# Patient Record
Sex: MALE | Race: WHITE | NOT HISPANIC OR LATINO | Employment: OTHER | URBAN - METROPOLITAN AREA
[De-identification: names, ages, dates, MRNs, and addresses within clinical notes are randomized per-mention and may not be internally consistent; named-entity substitution may affect disease eponyms.]

---

## 2020-12-28 LAB
LEFT EYE DIABETIC RETINOPATHY: POSITIVE
RIGHT EYE DIABETIC RETINOPATHY: NORMAL

## 2021-04-16 ENCOUNTER — TRANSCRIBE ORDERS (OUTPATIENT)
Dept: ADMINISTRATIVE | Facility: HOSPITAL | Age: 81
End: 2021-04-16

## 2021-04-16 DIAGNOSIS — Z01.818 OTHER SPECIFIED PRE-OPERATIVE EXAMINATION: ICD-10-CM

## 2021-04-16 DIAGNOSIS — Z01.818 PRE-OP TESTING: ICD-10-CM

## 2021-04-16 DIAGNOSIS — Z01.818 OTHER SPECIFIED PRE-OPERATIVE EXAMINATION: Primary | ICD-10-CM

## 2021-04-16 PROCEDURE — U0003 INFECTIOUS AGENT DETECTION BY NUCLEIC ACID (DNA OR RNA); SEVERE ACUTE RESPIRATORY SYNDROME CORONAVIRUS 2 (SARS-COV-2) (CORONAVIRUS DISEASE [COVID-19]), AMPLIFIED PROBE TECHNIQUE, MAKING USE OF HIGH THROUGHPUT TECHNOLOGIES AS DESCRIBED BY CMS-2020-01-R: HCPCS | Performed by: UROLOGY

## 2021-04-16 PROCEDURE — U0005 INFEC AGEN DETEC AMPLI PROBE: HCPCS | Performed by: UROLOGY

## 2021-04-16 NOTE — PRE-PROCEDURE INSTRUCTIONS
My Surgical Experience    The following information was developed to assist you to prepare for your operation  What do I need to do before coming to the hospital?   Arrange for a responsible person to drive you to and from the hospital    Arrange care for your children at home  Children are not allowed in the recovery areas of the hospital   Plan to wear clothing that is easy to put on and take off  If you are having shoulder surgery, wear a shirt that buttons or zippers in the front  Bathing  o Shower the evening before and the morning of your surgery with an antibacterial soap  Please refer to the Pre Op Showering Instructions for Surgery Patients Sheet   o Remove nail polish and all body piercing jewelry  o Do not shave any body part for at least 24 hours before surgery-this includes face, arms, legs and upper body  Food  o Nothing to eat or drink after midnight the night before your surgery  This includes candy and chewing gum  o Exception: If your surgery is after 12:00pm (noon), you may have clear liquids such as 7-Up®, ginger ale, apple or cranberry juice, Jell-O®, water, or clear broth until 8:00 am  o Do not drink milk or juice with pulp on the morning before surgery  o Do not drink alcohol 24 hours before surgery  Medicine  o Follow instructions you received from your surgeon about which medicines you may take on the day of surgery  o If instructed to take medicine on the morning of surgery, take pills with just a small sip of water  Call your prescribing doctor for specific infroamtion on what to do if you take insulin    What should I bring to the hospital?    Bring:  Edita Quintana or a walker, if you have them, for foot or knee surgery   A list of the daily medicines, vitamins, minerals, herbals and nutritional supplements you take   Include the dosages of medicines and the time you take them each day   Glasses, dentures or hearing aids   Minimal clothing; you will be wearing hospital sleepwear   Photo ID; required to verify your identity   If you have a Living Will or Power of , bring a copy of the documents   If you have an ostomy, bring an extra pouch and any supplies you use    Do not bring   Medicines or inhalers   Money, valuables or jewelry    What other information should I know about the day of surgery?  Notify your surgeons if you develop a cold, sore throat, cough, fever, rash or any other illness   Report to the Ambulatory Surgical/Same Day Surgery Unit   You will be instructed to stop at Registration only if you have not been pre-registered   Inform your  fi they do not stay that they will be asked by the staff to leave a phone number where they can be reached   Be available to be reached before surgery  In the event the operating room schedule changes, you may be asked to come in earlier or later than expected    *It is important to tell your doctor and others involved in your health care if you are taking or have been taking any non-prescription drugs, vitamins, minerals, herbals or other nutritional supplements  Any of these may interact with some food or medicines and cause a reaction      Pre-Surgery Instructions:   Medication Instructions    atorvastatin (LIPITOR) 20 mg tablet Instructed patient per Anesthesia Guidelines   clopidogrel (PLAVIX) 75 mg tablet Instructed patient per Anesthesia Guidelines   Dulaglutide 1 5 MG/0 5ML SOPN Instructed patient per Anesthesia Guidelines   finasteride (PROSCAR) 5 mg tablet Instructed patient per Anesthesia Guidelines   furosemide (LASIX) 20 mg tablet Instructed patient per Anesthesia Guidelines   glipiZIDE (GLUCOTROL XL) 5 mg 24 hr tablet Instructed patient per Anesthesia Guidelines   ketorolac (ACULAR) 0 5 % ophthalmic solution Instructed patient per Anesthesia Guidelines   losartan (COZAAR) 50 mg tablet Instructed patient per Anesthesia Guidelines      metoprolol succinate (TOPROL-XL) 50 mg 24 hr tablet Instructed patient per Anesthesia Guidelines   nitroglycerin (Nitrostat) 0 4 mg SL tablet Instructed patient per Anesthesia Guidelines   nystatin-triamcinolone (MYCOLOG-II) cream Instructed patient per Anesthesia Guidelines   potassium chloride (K-DUR,KLOR-CON) 20 mEq tablet Instructed patient per Anesthesia Guidelines   prednisoLONE acetate (PRED FORTE) 1 % ophthalmic suspension Instructed patient per Anesthesia Guidelines   tamsulosin (FLOMAX) 0 4 mg Instructed patient per Anesthesia Guidelines   [DISCONTINUED] ofloxacin (OCUFLOX) 0 3 % ophthalmic solution Instructed patient per Anesthesia Guidelines      To take proscar, metoprolol and eye drops a m  of surgery

## 2021-04-17 ENCOUNTER — OFFICE VISIT (OUTPATIENT)
Dept: LAB | Facility: HOSPITAL | Age: 81
End: 2021-04-17
Attending: UROLOGY
Payer: MEDICARE

## 2021-04-17 ENCOUNTER — APPOINTMENT (OUTPATIENT)
Dept: LAB | Facility: HOSPITAL | Age: 81
End: 2021-04-17
Attending: UROLOGY
Payer: MEDICARE

## 2021-04-17 DIAGNOSIS — Z01.818 PRE-OP TESTING: ICD-10-CM

## 2021-04-17 LAB
ANION GAP SERPL CALCULATED.3IONS-SCNC: 6 MMOL/L (ref 4–13)
BACTERIA UR QL AUTO: ABNORMAL /HPF
BASOPHILS # BLD AUTO: 0.05 THOUSANDS/ΜL (ref 0–0.1)
BASOPHILS NFR BLD AUTO: 1 % (ref 0–1)
BILIRUB UR QL STRIP: NEGATIVE
BUN SERPL-MCNC: 22 MG/DL (ref 5–25)
CALCIUM SERPL-MCNC: 9.2 MG/DL (ref 8.3–10.1)
CHLORIDE SERPL-SCNC: 102 MMOL/L (ref 100–108)
CLARITY UR: ABNORMAL
CO2 SERPL-SCNC: 32 MMOL/L (ref 21–32)
COLOR UR: YELLOW
CREAT SERPL-MCNC: 0.88 MG/DL (ref 0.6–1.3)
EOSINOPHIL # BLD AUTO: 0.19 THOUSAND/ΜL (ref 0–0.61)
EOSINOPHIL NFR BLD AUTO: 2 % (ref 0–6)
ERYTHROCYTE [DISTWIDTH] IN BLOOD BY AUTOMATED COUNT: 14.1 % (ref 11.6–15.1)
GFR SERPL CREATININE-BSD FRML MDRD: 82 ML/MIN/1.73SQ M
GLUCOSE SERPL-MCNC: 167 MG/DL (ref 65–140)
GLUCOSE UR STRIP-MCNC: NEGATIVE MG/DL
HCT VFR BLD AUTO: 43.4 % (ref 36.5–49.3)
HGB BLD-MCNC: 14 G/DL (ref 12–17)
HGB UR QL STRIP.AUTO: ABNORMAL
IMM GRANULOCYTES # BLD AUTO: 0.05 THOUSAND/UL (ref 0–0.2)
IMM GRANULOCYTES NFR BLD AUTO: 1 % (ref 0–2)
KETONES UR STRIP-MCNC: NEGATIVE MG/DL
LEUKOCYTE ESTERASE UR QL STRIP: ABNORMAL
LYMPHOCYTES # BLD AUTO: 2.74 THOUSANDS/ΜL (ref 0.6–4.47)
LYMPHOCYTES NFR BLD AUTO: 28 % (ref 14–44)
MCH RBC QN AUTO: 30 PG (ref 26.8–34.3)
MCHC RBC AUTO-ENTMCNC: 32.3 G/DL (ref 31.4–37.4)
MCV RBC AUTO: 93 FL (ref 82–98)
MONOCYTES # BLD AUTO: 0.75 THOUSAND/ΜL (ref 0.17–1.22)
MONOCYTES NFR BLD AUTO: 8 % (ref 4–12)
NEUTROPHILS # BLD AUTO: 5.93 THOUSANDS/ΜL (ref 1.85–7.62)
NEUTS SEG NFR BLD AUTO: 60 % (ref 43–75)
NITRITE UR QL STRIP: NEGATIVE
NON-SQ EPI CELLS URNS QL MICRO: ABNORMAL /HPF
NRBC BLD AUTO-RTO: 0 /100 WBCS
PH UR STRIP.AUTO: 5.5 [PH]
PLATELET # BLD AUTO: 247 THOUSANDS/UL (ref 149–390)
PMV BLD AUTO: 10.3 FL (ref 8.9–12.7)
POTASSIUM SERPL-SCNC: 3.8 MMOL/L (ref 3.5–5.3)
PROT UR STRIP-MCNC: NEGATIVE MG/DL
RBC # BLD AUTO: 4.66 MILLION/UL (ref 3.88–5.62)
RBC #/AREA URNS AUTO: ABNORMAL /HPF
SARS-COV-2 RNA RESP QL NAA+PROBE: NEGATIVE
SODIUM SERPL-SCNC: 140 MMOL/L (ref 136–145)
SP GR UR STRIP.AUTO: 1.02 (ref 1–1.03)
UROBILINOGEN UR QL STRIP.AUTO: 0.2 E.U./DL
WBC # BLD AUTO: 9.71 THOUSAND/UL (ref 4.31–10.16)
WBC #/AREA URNS AUTO: ABNORMAL /HPF

## 2021-04-17 PROCEDURE — 87086 URINE CULTURE/COLONY COUNT: CPT

## 2021-04-17 PROCEDURE — 93005 ELECTROCARDIOGRAM TRACING: CPT

## 2021-04-17 PROCEDURE — 36415 COLL VENOUS BLD VENIPUNCTURE: CPT | Performed by: UROLOGY

## 2021-04-17 PROCEDURE — 81001 URINALYSIS AUTO W/SCOPE: CPT | Performed by: UROLOGY

## 2021-04-17 PROCEDURE — 85025 COMPLETE CBC W/AUTO DIFF WBC: CPT | Performed by: UROLOGY

## 2021-04-17 PROCEDURE — 80048 BASIC METABOLIC PNL TOTAL CA: CPT

## 2021-04-18 LAB — BACTERIA UR CULT: NORMAL

## 2021-04-19 LAB
ATRIAL RATE: 89 BPM
P AXIS: 53 DEGREES
PR INTERVAL: 198 MS
QRS AXIS: 56 DEGREES
QRSD INTERVAL: 88 MS
QT INTERVAL: 354 MS
QTC INTERVAL: 430 MS
T WAVE AXIS: 59 DEGREES
VENTRICULAR RATE: 89 BPM

## 2021-04-19 PROCEDURE — 93010 ELECTROCARDIOGRAM REPORT: CPT | Performed by: INTERNAL MEDICINE

## 2021-04-20 ENCOUNTER — ANESTHESIA EVENT (OUTPATIENT)
Dept: PERIOP | Facility: HOSPITAL | Age: 81
End: 2021-04-20
Payer: MEDICARE

## 2021-04-20 ENCOUNTER — ANESTHESIA (OUTPATIENT)
Dept: PERIOP | Facility: HOSPITAL | Age: 81
End: 2021-04-20
Payer: MEDICARE

## 2021-04-20 ENCOUNTER — APPOINTMENT (OUTPATIENT)
Dept: RADIOLOGY | Facility: HOSPITAL | Age: 81
End: 2021-04-20
Payer: MEDICARE

## 2021-04-20 ENCOUNTER — HOSPITAL ENCOUNTER (OUTPATIENT)
Facility: HOSPITAL | Age: 81
Setting detail: OUTPATIENT SURGERY
Discharge: HOME | End: 2021-04-20
Attending: UROLOGY | Admitting: UROLOGY
Payer: MEDICARE

## 2021-04-20 VITALS
BODY MASS INDEX: 30.21 KG/M2 | WEIGHT: 204 LBS | TEMPERATURE: 97.4 F | HEIGHT: 69 IN | RESPIRATION RATE: 20 BRPM | HEART RATE: 66 BPM | SYSTOLIC BLOOD PRESSURE: 160 MMHG | DIASTOLIC BLOOD PRESSURE: 76 MMHG | OXYGEN SATURATION: 96 %

## 2021-04-20 DIAGNOSIS — N20.0 CALCULUS OF KIDNEY: ICD-10-CM

## 2021-04-20 DIAGNOSIS — R10.9 UNSPECIFIED ABDOMINAL PAIN: ICD-10-CM

## 2021-04-20 PROBLEM — E11.9 DIABETES MELLITUS, TYPE 2 (HCC): Status: ACTIVE | Noted: 2021-04-20

## 2021-04-20 PROBLEM — I21.9 MI (MYOCARDIAL INFARCTION) (HCC): Status: ACTIVE | Noted: 2021-04-20

## 2021-04-20 PROBLEM — I10 HTN (HYPERTENSION): Status: ACTIVE | Noted: 2021-04-20

## 2021-04-20 LAB — GLUCOSE SERPL-MCNC: 112 MG/DL (ref 65–140)

## 2021-04-20 PROCEDURE — C2617 STENT, NON-COR, TEM W/O DEL: HCPCS | Performed by: UROLOGY

## 2021-04-20 PROCEDURE — 82360 CALCULUS ASSAY QUANT: CPT | Performed by: UROLOGY

## 2021-04-20 PROCEDURE — C1894 INTRO/SHEATH, NON-LASER: HCPCS | Performed by: UROLOGY

## 2021-04-20 PROCEDURE — C1769 GUIDE WIRE: HCPCS | Performed by: UROLOGY

## 2021-04-20 PROCEDURE — 88300 SURGICAL PATH GROSS: CPT | Performed by: PATHOLOGY

## 2021-04-20 PROCEDURE — 74420 UROGRAPHY RTRGR +-KUB: CPT

## 2021-04-20 PROCEDURE — 82948 REAGENT STRIP/BLOOD GLUCOSE: CPT

## 2021-04-20 DEVICE — INLAY URETERAL STENT W/O GUIDEWIRE
Type: IMPLANTABLE DEVICE | Site: URETER | Status: FUNCTIONAL
Brand: BARD® INLAY® URETERAL STENT

## 2021-04-20 RX ORDER — MAGNESIUM HYDROXIDE 1200 MG/15ML
LIQUID ORAL AS NEEDED
Status: DISCONTINUED | OUTPATIENT
Start: 2021-04-20 | End: 2021-04-20 | Stop reason: HOSPADM

## 2021-04-20 RX ORDER — PHENAZOPYRIDINE HYDROCHLORIDE 100 MG/1
100 TABLET, FILM COATED ORAL 3 TIMES DAILY PRN
Qty: 20 TABLET | Refills: 0 | Status: SHIPPED | OUTPATIENT
Start: 2021-04-20 | End: 2022-07-21

## 2021-04-20 RX ORDER — CIPROFLOXACIN 2 MG/ML
400 INJECTION, SOLUTION INTRAVENOUS ONCE
Status: DISCONTINUED | OUTPATIENT
Start: 2021-04-20 | End: 2021-04-21 | Stop reason: HOSPADM

## 2021-04-20 RX ORDER — ONDANSETRON 2 MG/ML
INJECTION INTRAMUSCULAR; INTRAVENOUS AS NEEDED
Status: DISCONTINUED | OUTPATIENT
Start: 2021-04-20 | End: 2021-04-20

## 2021-04-20 RX ORDER — SODIUM CHLORIDE, SODIUM LACTATE, POTASSIUM CHLORIDE, CALCIUM CHLORIDE 600; 310; 30; 20 MG/100ML; MG/100ML; MG/100ML; MG/100ML
INJECTION, SOLUTION INTRAVENOUS CONTINUOUS PRN
Status: DISCONTINUED | OUTPATIENT
Start: 2021-04-20 | End: 2021-04-20

## 2021-04-20 RX ORDER — PROPOFOL 10 MG/ML
INJECTION, EMULSION INTRAVENOUS AS NEEDED
Status: DISCONTINUED | OUTPATIENT
Start: 2021-04-20 | End: 2021-04-20

## 2021-04-20 RX ORDER — DEXAMETHASONE SODIUM PHOSPHATE 4 MG/ML
INJECTION, SOLUTION INTRA-ARTICULAR; INTRALESIONAL; INTRAMUSCULAR; INTRAVENOUS; SOFT TISSUE AS NEEDED
Status: DISCONTINUED | OUTPATIENT
Start: 2021-04-20 | End: 2021-04-20

## 2021-04-20 RX ORDER — FENTANYL CITRATE 50 UG/ML
INJECTION, SOLUTION INTRAMUSCULAR; INTRAVENOUS AS NEEDED
Status: DISCONTINUED | OUTPATIENT
Start: 2021-04-20 | End: 2021-04-20

## 2021-04-20 RX ORDER — CIPROFLOXACIN 500 MG/1
500 TABLET, FILM COATED ORAL EVERY 12 HOURS SCHEDULED
Qty: 6 TABLET | Refills: 0 | Status: SHIPPED | OUTPATIENT
Start: 2021-04-20 | End: 2021-04-23

## 2021-04-20 RX ORDER — SODIUM CHLORIDE, SODIUM LACTATE, POTASSIUM CHLORIDE, CALCIUM CHLORIDE 600; 310; 30; 20 MG/100ML; MG/100ML; MG/100ML; MG/100ML
125 INJECTION, SOLUTION INTRAVENOUS CONTINUOUS
Status: DISCONTINUED | OUTPATIENT
Start: 2021-04-20 | End: 2021-04-21 | Stop reason: HOSPADM

## 2021-04-20 RX ORDER — EPHEDRINE SULFATE 50 MG/ML
INJECTION INTRAVENOUS AS NEEDED
Status: DISCONTINUED | OUTPATIENT
Start: 2021-04-20 | End: 2021-04-20

## 2021-04-20 RX ADMIN — PROPOFOL 30 MG: 10 INJECTION, EMULSION INTRAVENOUS at 16:08

## 2021-04-20 RX ADMIN — DEXAMETHASONE SODIUM PHOSPHATE 4 MG: 4 INJECTION, SOLUTION INTRA-ARTICULAR; INTRALESIONAL; INTRAMUSCULAR; INTRAVENOUS; SOFT TISSUE at 16:13

## 2021-04-20 RX ADMIN — SODIUM CHLORIDE, SODIUM LACTATE, POTASSIUM CHLORIDE, AND CALCIUM CHLORIDE: .6; .31; .03; .02 INJECTION, SOLUTION INTRAVENOUS at 16:01

## 2021-04-20 RX ADMIN — ONDANSETRON 4 MG: 2 INJECTION INTRAMUSCULAR; INTRAVENOUS at 16:13

## 2021-04-20 RX ADMIN — LIDOCAINE HYDROCHLORIDE 40 MG: 20 INJECTION, SOLUTION INTRAVENOUS at 16:04

## 2021-04-20 RX ADMIN — FENTANYL CITRATE 25 MCG: 50 INJECTION, SOLUTION INTRAMUSCULAR; INTRAVENOUS at 16:01

## 2021-04-20 RX ADMIN — FENTANYL CITRATE 25 MCG: 50 INJECTION, SOLUTION INTRAMUSCULAR; INTRAVENOUS at 16:46

## 2021-04-20 RX ADMIN — PROPOFOL 150 MG: 10 INJECTION, EMULSION INTRAVENOUS at 16:04

## 2021-04-20 RX ADMIN — FENTANYL CITRATE 50 MCG: 50 INJECTION, SOLUTION INTRAMUSCULAR; INTRAVENOUS at 17:01

## 2021-04-20 RX ADMIN — CIPROFLOXACIN 400 MG: 2 INJECTION INTRAVENOUS at 16:03

## 2021-04-20 RX ADMIN — EPHEDRINE SULFATE 5 MG: 50 INJECTION, SOLUTION INTRAVENOUS at 16:31

## 2021-04-20 RX ADMIN — SODIUM CHLORIDE, SODIUM LACTATE, POTASSIUM CHLORIDE, AND CALCIUM CHLORIDE 125 ML/HR: .6; .31; .03; .02 INJECTION, SOLUTION INTRAVENOUS at 15:18

## 2021-04-20 NOTE — ANESTHESIA PREPROCEDURE EVALUATION
Procedure:  CYSTOSCOPY RIGHT  URETEROSCOPY WITH LITHOTRIPSY HOLMIUM LASER, AGNIESZKA  RETROGRADE PYELOGRAM AND INSERTION  RIGHT STENT URETERAL (Right Bladder)    Relevant Problems   CARDIO   (+) HTN (hypertension)   (+) MI (myocardial infarction) (HCC)      ENDO   (+) Diabetes mellitus, type 2 (HCC)        Physical Exam    Airway    Mallampati score: II  TM Distance: >3 FB  Neck ROM: full     Dental   No notable dental hx     Cardiovascular  Cardiovascular exam normal    Pulmonary  Pulmonary exam normal     Other Findings        Anesthesia Plan  ASA Score- 3     Anesthesia Type- general with ASA Monitors  Additional Monitors:   Airway Plan: LMA  Plan Factors-Exercise tolerance (METS): >4 METS  Chart reviewed  EKG reviewed  Imaging results reviewed  Existing labs reviewed  Patient summary reviewed  Patient is a current smoker  Patient smoked on day of surgery  Induction- intravenous  Postoperative Plan- Plan for postoperative opioid use  Informed Consent- Anesthetic plan and risks discussed with patient  I personally reviewed this patient with the CRNA  Discussed and agreed on the Anesthesia Plan with the CRNA  Naseem Mora

## 2021-04-20 NOTE — INTERVAL H&P NOTE
Note reviewed and pt seen and examined with PA  Agree with assessment and plan  Unable to review images  He was aware but trusted Segundo's assessment

## 2021-04-20 NOTE — DISCHARGE INSTRUCTIONS
Antibiotic Instructions:  Post-op period: Take first dose tonight  Take second and third dose tomorrow  (take one basically every 12 hours for a total of 3 doses)    For day of stent removal:  Restart morning of stent removal and take until finished (every 12 hours)  3 doses

## 2021-04-20 NOTE — OP NOTE
OPERATIVE REPORT- Dr Simeon Conti  PATIENT NAME: Keturah Win    :  1940  MRN: [id number]  Pt Location: [facility] OR ROOM 04    SURGERY DATE: 2021    Surgeon: Alex Cedeno MD    Pre-op Diagnosis:  1  Right renal stones  15mm renal pelvis and right lower pole 3mm    Post-op Diagnosis:  1  Same    Procedure:  1  Cystoscopy  2  Fluoroscopy  3  Right retrograde pyelography  4  Right ureteroscopy  5  Laser lithotripsy  6  Stone basketing  7  Right ureteral stent placement    Specimen(s):  ID Type Source Tests Collected by Time Destination   1 :  Calculus Kidney, Right STONE ANALYSIS, TISSUE EXAM Alex Cedeno MD 2021 [id number]        Estimated Blood Loss:   Minimal    Complications:    None    Drains:   6 X 24 cm right ureteral stent    Anesthesia type:   General    Indications for surgery:  Please see the dictated history and physical     Findings:  Right renal stones  Procedure and Technique:   After obtaining consent and identifying the patient, antibiotics were given as ordered and the patient was brought to the room  All appropriate leads and monitors were placed and the patient was appropriately positioned on the table  Anesthesia was administered and the patient was sterilely prepped and draped  A timeout was performed where the patient name, , procedure, antibiotics, allergies, etc  were discussed  All in the room were satisfied before the start of the operative procedure  What follows are the operative findings and events  Cystoscopy was performed   imaging was obtained  The stones were located in the renal pelvis and lower pole  A retrograde pyelogram was performed  A guidewire was passed up the ureter to the kidney  A second wire was passed without difficulty  The safety wire was secured to the drape and then a 35 cm long ureteral access sheath was gently passed over the second wire using fluoroscopic guidance    The flexible ureteroscope was passed through the sheath up to the stone  The stones were identified and a picture was taken  The stones were broken with a 365 micron laser fiber  Stones of any significant size were extracted with a stone basket  A retrograde was performed  The scope was withdrawn down the ureter evaluating for any trauma  There were no stone fragments or trauma noted  The safety wire was backloaded through the cystoscope and the stent was placed over the wire  The guidewire was removed and a good coil was noted proximally in the kidney and distally in the bladder  The bladder was drained and the patient was awakened and  transferred to the PACU in satisfactory condition  Plan:  Stent removal in one week  SIGNATURE: Kyler Argueta MD  DATE: April 20, 2021  TIME: 5:32 PM    Portions of the record may have been created with voice recognition software   Occasional wrong word or "sound alike" substitutions may have occurred due to the inherent limitations of voice recognition software   Read the chart carefully and recognize, using context, where substitutions have occurred

## 2021-04-20 NOTE — ANESTHESIA POSTPROCEDURE EVALUATION
Post-Op Assessment Note    CV Status:  Stable  Pain Score: 0    Pain management: adequate        Post Op Vitals Reviewed: Yes      Staff: Anesthesiologist         No complications documented      BP      Temp     Pulse     Resp      SpO2

## 2021-04-28 LAB
CALCIUM OXALATE DIHYDRATE MFR STONE IR: 80 %
COLOR STONE: NORMAL
COM MFR STONE: 10 %
COMMENT-STONE3: NORMAL
COMPOSITION: NORMAL
HYDROXYAPATITE 24H ENGDIFF UR: 10 %
LABORATORY COMMENT REPORT: NORMAL
PHOTO: NORMAL
SIZE STONE: NORMAL MM
SPEC SOURCE SUBJ: NORMAL
STONE ANALYSIS-IMP: NORMAL
WT STONE: 126 MG

## 2021-10-01 ENCOUNTER — TELEPHONE (OUTPATIENT)
Dept: GASTROENTEROLOGY | Facility: CLINIC | Age: 81
End: 2021-10-01

## 2022-01-13 RX ORDER — SILDENAFIL 50 MG/1
TABLET, FILM COATED ORAL DAILY
COMMUNITY
End: 2022-07-21

## 2022-01-13 RX ORDER — TESTOSTERONE CYPIONATE 200 MG/ML
VIAL (ML) INTRAMUSCULAR
COMMUNITY

## 2022-01-18 ENCOUNTER — HOSPITAL ENCOUNTER (OUTPATIENT)
Dept: GASTROENTEROLOGY | Facility: AMBULATORY SURGERY CENTER | Age: 82
Discharge: HOME | End: 2022-01-18
Payer: MEDICARE

## 2022-01-18 ENCOUNTER — ANESTHESIA (OUTPATIENT)
Dept: GASTROENTEROLOGY | Facility: AMBULATORY SURGERY CENTER | Age: 82
End: 2022-01-18

## 2022-01-18 ENCOUNTER — ANESTHESIA EVENT (OUTPATIENT)
Dept: GASTROENTEROLOGY | Facility: AMBULATORY SURGERY CENTER | Age: 82
End: 2022-01-18

## 2022-01-18 VITALS
RESPIRATION RATE: 18 BRPM | TEMPERATURE: 96.7 F | OXYGEN SATURATION: 99 % | DIASTOLIC BLOOD PRESSURE: 68 MMHG | SYSTOLIC BLOOD PRESSURE: 130 MMHG | HEIGHT: 69 IN | BODY MASS INDEX: 30.36 KG/M2 | HEART RATE: 69 BPM | WEIGHT: 205 LBS

## 2022-01-18 DIAGNOSIS — Z86.010 HX OF ADENOMATOUS COLONIC POLYPS: ICD-10-CM

## 2022-01-18 PROCEDURE — 45385 COLONOSCOPY W/LESION REMOVAL: CPT | Performed by: INTERNAL MEDICINE

## 2022-01-18 PROCEDURE — 00811 ANES LWR INTST NDSC NOS: CPT | Performed by: NURSE ANESTHETIST, CERTIFIED REGISTERED

## 2022-01-18 PROCEDURE — 88305 TISSUE EXAM BY PATHOLOGIST: CPT | Performed by: PATHOLOGY

## 2022-01-18 PROCEDURE — 99100 ANES PT EXTEME AGE<1 YR&>70: CPT | Performed by: NURSE ANESTHETIST, CERTIFIED REGISTERED

## 2022-01-18 RX ORDER — SODIUM CHLORIDE 9 MG/ML
20 INJECTION, SOLUTION INTRAVENOUS CONTINUOUS
Status: DISCONTINUED | OUTPATIENT
Start: 2022-01-18 | End: 2022-01-22 | Stop reason: HOSPADM

## 2022-01-18 RX ORDER — SODIUM CHLORIDE 9 MG/ML
30 INJECTION, SOLUTION INTRAVENOUS CONTINUOUS
Status: DISCONTINUED | OUTPATIENT
Start: 2022-01-18 | End: 2022-01-22 | Stop reason: HOSPADM

## 2022-01-18 RX ORDER — PROPOFOL 10 MG/ML
INJECTION, EMULSION INTRAVENOUS AS NEEDED
Status: DISCONTINUED | OUTPATIENT
Start: 2022-01-18 | End: 2022-01-18

## 2022-01-18 RX ADMIN — PROPOFOL 50 MG: 10 INJECTION, EMULSION INTRAVENOUS at 08:08

## 2022-01-18 RX ADMIN — PROPOFOL 130 MG: 10 INJECTION, EMULSION INTRAVENOUS at 08:03

## 2022-01-18 RX ADMIN — PROPOFOL 30 MG: 10 INJECTION, EMULSION INTRAVENOUS at 08:27

## 2022-01-18 RX ADMIN — PROPOFOL 20 MG: 10 INJECTION, EMULSION INTRAVENOUS at 08:17

## 2022-01-18 RX ADMIN — PROPOFOL 20 MG: 10 INJECTION, EMULSION INTRAVENOUS at 08:05

## 2022-01-18 RX ADMIN — PROPOFOL 50 MG: 10 INJECTION, EMULSION INTRAVENOUS at 08:11

## 2022-01-18 RX ADMIN — SODIUM CHLORIDE: 9 INJECTION, SOLUTION INTRAVENOUS at 07:59

## 2022-01-18 RX ADMIN — PROPOFOL 50 MG: 10 INJECTION, EMULSION INTRAVENOUS at 08:14

## 2022-01-18 NOTE — ANESTHESIA POSTPROCEDURE EVALUATION
Post-Op Assessment Note    CV Status:  Stable  Pain Score: 0    Pain management: adequate     Mental Status:  Awake   Hydration Status:  Stable   PONV Controlled:  None   Airway Patency:  Patent      Post Op Vitals Reviewed: Yes      Staff: CRNA         No complications documented      /69 (01/18/22 0835)    Temp     Pulse 80 (01/18/22 0835)   Resp 18 (01/18/22 0835)    SpO2 99 % (01/18/22 0835)

## 2022-01-18 NOTE — ANESTHESIA PREPROCEDURE EVALUATION
Procedure:  COLONOSCOPY    Relevant Problems   ANESTHESIA (within normal limits)      CARDIO   (+) Coronary artery disease   (+) HTN (hypertension)   (+) MI (myocardial infarction) (HCC)      ENDO   (+) Diabetes mellitus, type 2 (HCC)      GI/HEPATIC (within normal limits)      /RENAL   (+) Calculus, kidney      GYN (within normal limits)      HEMATOLOGY (within normal limits)      MUSCULOSKELETAL (within normal limits)      NEURO/PSYCH (within normal limits)      PULMONARY (within normal limits)      Other   (+) Bladder polyps   (+) Colon polyp   (+) HL (hearing loss)        Physical Exam    Airway    Mallampati score: II  TM Distance: >3 FB  Neck ROM: full     Dental   lower dentures and upper dentures,     Cardiovascular  Cardiovascular exam normal    Pulmonary  Decreased breath sounds,     Other Findings        Anesthesia Plan  ASA Score- 2     Anesthesia Type- IV sedation with anesthesia with ASA Monitors  Additional Monitors:   Airway Plan:           Plan Factors-Exercise tolerance (METS): >4 METS  Chart reviewed  Patient is a current smoker  Patient instructed to abstain from smoking on day of procedure  Patient smoked on day of surgery  Induction- intravenous  Postoperative Plan-     Informed Consent- Anesthetic plan and risks discussed with patient

## 2022-01-18 NOTE — DISCHARGE INSTRUCTIONS
Colonoscopy   WHAT YOU NEED TO KNOW:   A colonoscopy is a procedure to examine the inside of your colon (intestine) with a scope  Polyps or tissue growths may have been removed during your colonoscopy  It is normal to feel bloated and to have some abdominal discomfort  You should be passing gas  If you have hemorrhoids or you had polyps removed, you may have a small amount of bleeding  DISCHARGE INSTRUCTIONS:   Seek care immediately if:    You have sudden, severe abdominal pain   You have problems swallowing   You have a large amount of black, sticky bowel movements or blood in your bowel movements   You have sudden trouble breathing   You feel weak, lightheaded, or faint or your heart beats faster than normal for you  Contact your healthcare provider if:    You have a fever and chills   You have nausea or are vomiting   Your abdomen is bloated or feels full and hard   You have abdominal pain   You have black, sticky bowel movements or blood in your bowel movements   You have not had a bowel movement for 3 days after your procedure   You have rash or hives   You have questions or concerns about your procedure  Activity:    Do not lift, strain, or run for 24 hours after your procedure   Rest after your procedure  You have been given medicine to relax you  Do not drive or make important decisions until the day after your procedure  Return to your normal activity as directed   Relieve gas and discomfort from bloating by lying on your right side with a heating pad on your abdomen  You may need to take short walks to help the gas move out  Eat small meals until bloating is relieved  Follow up with your healthcare provider as directed: Write down your questions so you remember to ask them during your visits  If you take a blood thinner, please review the specific instructions from your endoscopist about when you should resume it   These can be found in the Recommendation and Your Medication list sections of this After Visit Summary  High Fiber Diet   WHAT YOU NEED TO KNOW:   What is a high-fiber diet? A high-fiber diet includes foods that have a high amount of fiber  Fiber is the part of fruits, vegetables, and grains that is not broken down by your body  Fiber keeps your bowel movements regular  Fiber can also help lower your cholesterol level, control blood sugar in people with diabetes, and relieve constipation  Fiber can also help you control your weight because it helps you feel full faster  Most adults should eat 25 to 35 grams of fiber each day  Talk to your dietitian or healthcare provider about the amount of fiber you need  What foods are good sources of fiber? · Foods with at least 4 grams of fiber per serving:      ? ? to ½ cup of high-fiber cereal (check the nutrition label on the box)    ? ½ cup of blackberries or raspberries    ? 4 dried prunes    ? 1 cooked artichoke    ? ½ cup of cooked legumes, such as lentils, or red, kidney, and borjas beans    · Foods with 1 to 3 grams of fiber per serving:      ? 1 slice of whole-wheat, pumpernickel, or rye bread    ? ½ cup of cooked brown rice    ? 4 whole-wheat crackers    ? 1 cup of oatmeal    ? ½ cup of cereal with 1 to 3 grams of fiber per serving (check the nutrition label on the box)    ? 1 small piece of fruit, such as an apple, banana, pear, kiwi, or orange    ? 3 dates    ? ½ cup of canned apricots, fruit cocktail, peaches, or pears    ? ½ cup of raw or cooked vegetables, such as carrots, cauliflower, cabbage, spinach, squash, or corn    What are some ways that I can increase fiber in my diet? · Choose brown or wild rice instead of white rice  · Use whole wheat flour in recipes instead of white or all-purpose flour  · Add beans and peas to casseroles or soups  · Choose fresh fruit and vegetables with peels or skins on instead of juices      What other guidelines should I follow? · Add fiber to your diet slowly  You may have abdominal discomfort, bloating, and gas if you add fiber to your diet too quickly  · Drink plenty of liquids as you add fiber to your diet  You may have nausea or develop constipation if you do not drink enough water  Ask how much liquid to drink each day and which liquids are best for you  CARE AGREEMENT:   You have the right to help plan your care  Discuss treatment options with your healthcare provider to decide what care you want to receive  You always have the right to refuse treatment  The above information is an  only  It is not intended as medical advice for individual conditions or treatments  Talk to your doctor, nurse or pharmacist before following any medical regimen to see if it is safe and effective for you  © Copyright [employer] 2021 Information is for End User's use only and may not be sold, redistributed or otherwise used for commercial purposes  All illustrations and images included in CareNotes® are the copyrighted property of A D A M , Inc  or [facility] Gricelda Baldwin   Hemorrhoids   WHAT YOU NEED TO KNOW:   What are hemorrhoids? Hemorrhoids are swollen blood vessels inside your rectum (internal hemorrhoids) or on your anus (external hemorrhoids)  Sometimes a hemorrhoid may prolapse  This means it extends out of your anus  What increases my risk for hemorrhoids? · Pregnancy or obesity    · Straining or sitting for a long time during bowel movements    · Liver disease    · Weak muscles around the anus caused by older age, rectal surgery, or anal intercourse    · A lack of physical activity    · Chronic diarrhea or constipation    · A low-fiber diet    What are the signs and symptoms of hemorrhoids?    · Pain or itching around your anus or inside your rectum    · Swelling or bumps around your anus    · Bright red blood in your bowel movement, on the toilet paper, or in the toilet bowl    · Tissue bulging out of your anus (prolapsed hemorrhoids)    · Incontinence (poor control over urine or bowel movements)    How are hemorrhoids diagnosed? Your healthcare provider will ask about your symptoms, the foods you eat, and your bowel movements  He or she will examine your anus for external hemorrhoids  You may need the following:  · A digital rectal exam  is a test to check for hemorrhoids  Your healthcare provider will put a gloved finger inside your anus to feel for the hemorrhoids  · An anoscopy  is a test that uses a scope (small tube with a light and camera on the end) to look at your hemorrhoids  How are hemorrhoids treated? Treatment will depend on your symptoms  You may need any of the following:  · Medicines  can help decrease pain and swelling, and soften your bowel movement  The medicine may be a pill, pad, cream, or ointment  · Procedures  may be used to shrink or remove your hemorrhoid  Examples include rubber-band ligation, sclerotherapy, and photocoagulation  These procedures may be done in your healthcare provider's office  Ask your healthcare provider for more information about these procedures  · Surgery  may be needed to shrink or remove your hemorrhoids  How can I manage my symptoms? · Apply ice on your anus for 15 to 20 minutes every hour or as directed  Use an ice pack, or put crushed ice in a plastic bag  Cover it with a towel before you apply it to your anus  Ice helps prevent tissue damage and decreases swelling and pain  · Take a sitz bath  Fill a bathtub with 4 to 6 inches of warm water  You may also use a sitz bath pan that fits inside a toilet bowl  Sit in the sitz bath for 15 minutes  Do this 3 times a day, and after each bowel movement  The warm water can help decrease pain and swelling  · Keep your anal area clean  Gently wash the area with warm water daily  Soap may irritate the area  After a bowel movement, wipe with moist towelettes or wet toilet paper   Dry toilet paper can irritate the area  How can I help prevent hemorrhoids? · Do not strain to have a bowel movement  Do not sit on the toilet too long  These actions can increase pressure on the tissues in your rectum and anus  · Drink plenty of liquids  Liquids can help prevent constipation  Ask how much liquid to drink each day and which liquids are best for you  · Eat a variety of high-fiber foods  Examples include fruits, vegetables, and whole grains  Ask your healthcare provider how much fiber you need each day  You may need to take a fiber supplement  · Exercise as directed  Exercise, such as walking, may make it easier to have a bowel movement  Ask your healthcare provider to help you create an exercise plan  · Do not have anal sex  Anal sex can weaken the skin around your rectum and anus  · Avoid heavy lifting  This can cause straining and increase your risk for another hemorrhoid  When should I seek immediate care? · You have severe pain in your rectum or around your anus  · You have severe pain in your abdomen and you are vomiting  · You have bleeding from your anus that soaks through your underwear  When should I contact my healthcare provider? · You have frequent and painful bowel movements  · Your hemorrhoid looks or feels more swollen than usual      · You do not have a bowel movement for 2 days or more  · You see or feel tissue coming through your anus  · You have questions or concerns about your condition or care  CARE AGREEMENT:   You have the right to help plan your care  Learn about your health condition and how it may be treated  Discuss treatment options with your healthcare providers to decide what care you want to receive  You always have the right to refuse treatment  The above information is an  only  It is not intended as medical advice for individual conditions or treatments   Talk to your doctor, nurse or pharmacist before following any medical regimen to see if it is safe and effective for you  © Copyright [employer] 2021 Information is for End User's use only and may not be sold, redistributed or otherwise used for commercial purposes  All illustrations and images included in CareNotes® are the copyrighted property of A D A M , Inc  or Maria Del Carmen Pelletier  Diverticulosis   WHAT YOU NEED TO KNOW:   What is diverticulosis? Diverticulosis is a condition that causes small pockets called diverticula to form in your intestine  These pockets make it difficult for bowel movements to pass through your digestive system  What causes diverticulosis? Diverticula form when muscles have to work hard to move bowel movements through the intestine  The force causes bulges to form at weak areas in the intestine  This may happen if you eat foods that are low in fiber  Fiber helps give your bowel movements more bulk so they are larger and easier to move through your colon  The following may increase your risk of diverticulosis:  · A history of constipation    · Age 36 or older    · Obesity    · Lack of exercise    What are the signs and symptoms of diverticulosis? Diverticulosis usually does not cause any signs or symptoms  It may cause any of the following in some people:  · Pain or discomfort in your lower abdomen    · Abdominal bloating    · Constipation or diarrhea    How is diverticulosis diagnosed? Your healthcare provider will examine you and ask about your bowel movements, diet, and symptoms  He or she will also ask about any medical conditions you have or medicines you take  You may need any of the following:  · Blood tests  may be done to check for signs of inflammation  · A barium enema  is an x-ray of your colon that may show diverticula  A tube is put into your anus, and a liquid called barium is put through the tube   Barium is used so that healthcare providers can see your colon more clearly  · Flexible sigmoidoscopy  is a test to look for any changes in your lower intestines and rectum  It may also show the cause of any bleeding or pain  A soft, bendable tube with a light on the end will be put into your anus  It will then be moved forward into your intestine  · A colonoscopy  is used to look at your whole colon  A scope (long bendable tube with a light on the end) is used to take pictures  This test may show diverticula  · A CT scan , or CAT scan, may show diverticula  You may be given contrast liquid before the scan  Tell the healthcare provider if you have ever had an allergic reaction to contrast liquid  How is diverticulosis managed? The goal of treatment is to manage any symptoms you have and prevent other problems such as diverticulitis  Diverticulitis is swelling or infection of the diverticula  Your healthcare provider may recommend any of the following:  · Eat a variety of high-fiber foods  High-fiber foods help you have regular bowel movements  High-fiber foods include cooked beans, fruits, vegetables, and some cereals  Most adults need 25 to 35 grams of fiber each day  Your healthcare provider may recommend that you have more  Ask your healthcare provider how much fiber you need  Increase fiber slowly  You may have abdominal discomfort, bloating, and gas if you add fiber to your diet too quickly  You may need to take a fiber supplement if you are not getting enough fiber from food  · Medicines  to soften your bowel movements may be given  You may also need medicines to treat symptoms such as bloating and pain  · Drink liquids as directed  You may need to drink 2 to 3 liters (8 to 12 cups) of liquids every day  Ask your healthcare provider how much liquid to drink each day and which liquids are best for you  · Apply heat  on your abdomen for 20 to 30 minutes every 2 hours for as many days as directed  Heat helps decrease pain and muscle spasms      How can I help prevent diverticulitis or other symptoms? The following may help decrease your risk for diverticulitis or symptoms, such as bleeding  Talk to your provider about these or other things you can do to prevent problems that may occur with diverticulosis  · Exercise regularly  Ask your healthcare provider about the best exercise plan for you  Exercise can help you have regular bowel movements  Get 30 minutes of exercise on most days of the week  · Maintain a healthy weight  Ask your healthcare provider what a healthy weight is for you  Ask him or her to help you create a weight loss plan if you are overweight  · Do not smoke  Nicotine and other chemicals in cigarettes increase your risk for diverticulitis  Ask your healthcare provider for information if you currently smoke and need help to quit  E-cigarettes or smokeless tobacco still contain nicotine  Talk to your healthcare provider before you use these products  · Ask your healthcare provider if it is safe to take NSAIDs  NSAIDs may increase your risk of diverticulitis  When should I seek immediate care? · You have severe pain on the left side of your lower abdomen  · Your bowel movements are bright or dark red  When should I call my doctor? · You have a fever and chills  · You feel dizzy or lightheaded  · You have nausea, or you are vomiting  · You have a change in your bowel movements  · You have questions or concerns about your condition or care  CARE AGREEMENT:   You have the right to help plan your care  Learn about your health condition and how it may be treated  Discuss treatment options with your healthcare providers to decide what care you want to receive  You always have the right to refuse treatment  The above information is an  only  It is not intended as medical advice for individual conditions or treatments   Talk to your doctor, nurse or pharmacist before following any medical regimen to see if it is safe and effective for you  © Copyright [address] 2021 Information is for End User's use only and may not be sold, redistributed or otherwise used for commercial purposes  All illustrations and images included in CareNotes® are the copyrighted property of A D A M , Inc  or Maria Del Carmen Baldwin   Colorectal Polyps   WHAT YOU NEED TO KNOW:   What are colorectal polyps? Colorectal polyps are small growths of tissue in the lining of the colon and rectum  Most polyps are usually benign (not cancer)  Certain types of polyps, called adenomatous polyps, may turn into cancer  What increases my risk for colorectal polyps? The exact cause of colorectal polyps is unknown  The following may increase your risk:  · Older age    · Foods high in fat and low in fiber    · Family history of polyps    · Intestinal diseases, such as Crohn disease or ulcerative colitis    · Smoking cigarettes or drinking alcohol    · Lack of physical activity, such as exercise    · Obesity    What are the signs and symptoms of colorectal polyps? · Blood in your bowel movement or bleeding from the rectum    · Change in bowel movement habits, such as diarrhea or constipation    · Abdominal pain    What do I need to know about colorectal polyp screening and diagnosis? Screening means you are checked for polyps that may be cancer, even if you do not have signs or symptoms  Screening is recommended starting at age 48 and continuing to age 76 if you are at average risk  Your healthcare provider may suggest screening starting at age 39  Screening may start before you are 45 or continue after you are 75 if your risk is high  Your provider will tell you how often to get screened  Timing depends on the type of screening and if polyps are found  Timing also depends on your age and if you are at increased risk for cancer  Screening may be recommended every 1, 2, 5, or 10 years   Your healthcare provider will need to test polyps to find out if they are cancer  Any of the following may be used to find polyps:  · A digital rectal exam  means your provider will use a finger to check for polyps  · A barium enema  is an x-ray of the colon  A tube is put into your anus, and a liquid called barium is put through the tube  Barium is used so that healthcare providers can see your colon better on the x-ray film  · A virtual colonoscopy  is a CT scan that takes pictures of the inside of your colon and rectum  A small, flexible tube is put into your rectum and air or carbon dioxide (gas) is used to expand your colon  This lets healthcare providers clearly see your colon and any polyps on a monitor  · Colonoscopy or sigmoidoscopy  are procedures to help your provider see the inside of your colon  He or she will use a flexible tube with a small light and camera on the end  During a sigmoidoscopy, your provider will only look at rectum and lower colon  During a colonoscopy, he or she will look at the full length of your colon  A small amount of tissue may be removed from your colon for tests  How are colorectal polyps treated? Small, benign polyps may not need treatment  Your healthcare provider will check the polyp over time to make sure it is not changing  Polyps that are cancer may be removed with one of the following:  · A polypectomy  is a minimally invasive procedure to remove a polyp during a colonoscopy or sigmoidoscopy  Your healthcare provider may need to remove the polyp with a laparoscope  Laparoscopy is done by inserting a small, flexible scope into incisions made on your abdomen  · Surgery  may be needed to remove large or deep polyps that cannot be removed in a polypectomy  Tissues or lymph nodes near a polyp may also be removed  This helps stop cancer from spreading  What can I do to lower my risk for colorectal polyps? · Eat a variety of healthy foods    Healthy foods include fruit, vegetables, whole-grain breads, low-fat dairy products, beans, lean meat, and fish  Ask if you need to be on a special diet  · Maintain a healthy weight  Ask your healthcare provider what a healthy weight is for you  Ask him or her to help with a weight loss plan if you are overweight  · Exercise as directed  Begin to exercise slowly and do more as you get stronger  Talk with your healthcare provider before you start an exercise program          · Limit alcohol  Your risk for polyps increases the more you drink  · Do not smoke  Nicotine and other chemicals in cigarettes and cigars increase your risk for polyps  Ask your healthcare provider for information if you currently smoke and need help to quit  E-cigarettes or smokeless tobacco still contain nicotine  Talk to your healthcare provider before you use these products  Where can I find more information? · [place] ([facility])  [address], [place] [address]  Phone: [phone]  Web Address: Elita Bernheim  [facility] gov    Call your local emergency number (911 in the [address]) if:   · You have sudden shortness of breath  · You have a fast heart rate, fast breathing, or are too dizzy to stand up  When should I seek immediate care? · You have severe abdominal pain  · You see blood in your bowel movement  When should I call my doctor? · You have a fever  · You have chills, a cough, or feel weak and achy  · You have abdominal pain that does not go away or gets worse after you take medicine  · Your abdomen is swollen  · You are losing weight without trying  · You have questions or concerns about your condition or care  CARE AGREEMENT:   You have the right to help plan your care  Learn about your health condition and how it may be treated  Discuss treatment options with your healthcare providers to decide what care you want to receive  You always have the right to refuse treatment   The above information is an  only  It is not intended as medical advice for individual conditions or treatments  Talk to your doctor, nurse or pharmacist before following any medical regimen to see if it is safe and effective for you  © Copyright [facility] 2021 Information is for End User's use only and may not be sold, redistributed or otherwise used for commercial purposes   All illustrations and images included in CareNotes® are the copyrighted property of A [employer] A M , Inc  or [address] [place] [address]

## 2022-01-18 NOTE — H&P
History and Physical - [facility]  Lashell Doan 80 y o  male MRN: [id number]                  HPI: Lashell Doan is a 80y o  year old male who presents for colonoscopy due to history of tubular adenomas last colonoscopy 2016      REVIEW OF SYSTEMS: Per the HPI, and otherwise unremarkable  Historical Information   Past Medical History:   Diagnosis Date    Bladder polyps     history of    Colon polyp     Coronary artery disease     Diabetes mellitus ([address] )     Ear problems     HL (hearing loss)     Hyperlipidemia     Hypertension     Kidney stone     [facility] infarction [facility] [address] [facility]     Tinnitus      Past Surgical History:   Procedure Laterality Date    CATARACT EXTRACTION      CHOLECYSTECTOMY      COLONOSCOPY      CORONARY ANGIOPLASTY WITH STENT PLACEMENT      x3 last one 2019    FL RETROGRADE PYELOGRAM  4/20/2021    MYRINGOTOMY W/ TUBES      CO CYSTO/URETERO W/LITHOTRIPSY &INDWELL STENT INSRT Right 4/20/2021    Procedure: CYSTOSCOPY RIGHT  URETEROSCOPY WITH LITHOTRIPSY HOLMIUM LASER, AGNIESZKA  RETROGRADE PYELOGRAM AND INSERTION  RIGHT STENT URETERAL;  Surgeon: Tj Kumar MD;  Location: [facility];  Service: Urology    TONSILLECTOMY      WISDOM TOOTH EXTRACTION       Social History   Social History     Substance and Sexual Activity   Alcohol Use Not Currently     Social History     Substance and Sexual Activity   Drug Use Never     Social History     Tobacco Use   Smoking Status Current Every Day Smoker    Packs/day: 0 50    Years: 50 00    Pack years: 25 00    Types: Cigarettes   Smokeless Tobacco Never Used     History reviewed  No pertinent family history      Meds/Allergies       Current Outpatient Medications:     ASPIRIN PO    atorvastatin (LIPITOR) 20 mg tablet    clopidogrel (PLAVIX) 75 mg tablet    Dulaglutide 1 5 MG/0 5ML SOPN    finasteride (PROSCAR) 5 mg tablet    furosemide (LASIX) 20 mg tablet    glipiZIDE (GLUCOTROL XL) 5 mg 24 hr tablet   GLUCOSAMINE-CHONDROITIN PO    losartan (COZAAR) 50 mg tablet    metoprolol succinate (TOPROL-XL) 50 mg 24 hr tablet    nystatin-triamcinolone (MYCOLOG-II) cream    potassium chloride (K-DUR,KLOR-CON) 20 mEq tablet    sildenafil (VIAGRA) 50 MG tablet    tamsulosin (FLOMAX) 0 4 mg    Testosterone Cypionate 200 MG/ML SOLN    ketorolac (ACULAR) 0 5 % ophthalmic solution    nitroglycerin (Nitrostat) 0 4 mg SL tablet    phenazopyridine (PYRIDIUM) 100 mg tablet    TRAMADOL HCL PO    Current Facility-Administered Medications:     sodium chloride 0 9 % infusion, 30 mL/hr, Intravenous, Continuous    Allergies   Allergen Reactions    Penicillins Rash     none         Objective     /73   Pulse 85   Temp (!) 96 7 °F (35 9 °C) (Temporal)   Resp 18   Ht 5' 9" (1 753 m)   Wt 93 kg (205 lb)   SpO2 98%   BMI 30 27 kg/m²       PHYSICAL EXAM    Gen: NAD  Head: NCAT  CV: RRR  CHEST: Clear  ABD: soft, NT/ND  EXT: no edema      ASSESSMENT/PLAN:  This is a 80y o  year old male here for colonoscopy, and he is stable and optimized for his procedure

## 2022-07-12 ENCOUNTER — TELEPHONE (OUTPATIENT)
Dept: FAMILY MEDICINE CLINIC | Facility: CLINIC | Age: 82
End: 2022-07-12

## 2022-07-12 NOTE — TELEPHONE ENCOUNTER
Pt called requesting to see the doctor he would not give me any information Henny Thurman then took the call and pt stated he was having chest pain and shortness of breath  He stated that he called Cardiology and they could not see him, he was advised to go to the ER

## 2022-07-15 ENCOUNTER — TELEPHONE (OUTPATIENT)
Dept: FAMILY MEDICINE CLINIC | Facility: CLINIC | Age: 82
End: 2022-07-15

## 2022-07-15 NOTE — TELEPHONE ENCOUNTER
Pt called and was asking if I can resubmitt his patient assistance paperwork to the [facility] program  I down loaded the new forms and they are complete for the Dr to sign   will attach to this call and scan into chart        Nenita Guadarrama

## 2022-07-21 ENCOUNTER — DOCUMENTATION (OUTPATIENT)
Dept: FAMILY MEDICINE CLINIC | Facility: CLINIC | Age: 82
End: 2022-07-21

## 2022-07-21 DIAGNOSIS — E11.51 TYPE 2 DIABETES MELLITUS WITH DIABETIC PERIPHERAL ANGIOPATHY WITHOUT GANGRENE, WITHOUT LONG-TERM CURRENT USE OF INSULIN (HCC): Primary | ICD-10-CM

## 2022-07-21 RX ORDER — ROSUVASTATIN CALCIUM 10 MG/1
TABLET, COATED ORAL DAILY
COMMUNITY
End: 2022-09-06

## 2022-07-21 NOTE — TELEPHONE ENCOUNTER
This is [facility] pharmacy calling about Baby Manner [id number]  He is requesting a refill on his metformin 1000 milligrams and we got a denial saying that you already responded by other means, but we have not received any prescriptions for metformin for Baby Manner [phone]  So could you send it through again or give us a call [phone]? Thank you  Please refill this prescription for Mr Bj Lake for metformin 1000mg  BID to [facility]  Thank you

## 2022-07-26 ENCOUNTER — RA CDI HCC (OUTPATIENT)
Dept: OTHER | Facility: HOSPITAL | Age: 82
End: 2022-07-26

## 2022-07-26 NOTE — PROGRESS NOTES
[id number]  [facility]  coding opportunities          Chart Reviewed number of suggestions sent to Provider: 1     Patients Insurance     Medicare Insurance: [employer]

## 2022-08-03 ENCOUNTER — OFFICE VISIT (OUTPATIENT)
Dept: FAMILY MEDICINE CLINIC | Facility: CLINIC | Age: 82
End: 2022-08-03
Payer: MEDICARE

## 2022-08-03 VITALS
TEMPERATURE: 97 F | OXYGEN SATURATION: 97 % | DIASTOLIC BLOOD PRESSURE: 73 MMHG | RESPIRATION RATE: 18 BRPM | BODY MASS INDEX: 30.98 KG/M2 | WEIGHT: 209.2 LBS | SYSTOLIC BLOOD PRESSURE: 140 MMHG | HEIGHT: 69 IN | HEART RATE: 81 BPM

## 2022-08-03 DIAGNOSIS — E11.9 TYPE 2 DIABETES MELLITUS WITHOUT COMPLICATION, WITHOUT LONG-TERM CURRENT USE OF INSULIN (HCC): Primary | ICD-10-CM

## 2022-08-03 DIAGNOSIS — G47.00 INSOMNIA, UNSPECIFIED TYPE: ICD-10-CM

## 2022-08-03 DIAGNOSIS — I77.9 PERIPHERAL ARTERIAL OCCLUSIVE DISEASE (HCC): ICD-10-CM

## 2022-08-03 DIAGNOSIS — I10 PRIMARY HYPERTENSION: ICD-10-CM

## 2022-08-03 DIAGNOSIS — I25.10 ATHEROSCLEROSIS OF NATIVE CORONARY ARTERY OF NATIVE HEART WITHOUT ANGINA PECTORIS: ICD-10-CM

## 2022-08-03 PROBLEM — M48.061 SPINAL STENOSIS OF LUMBAR REGION: Status: ACTIVE | Noted: 2022-08-03

## 2022-08-03 PROBLEM — F17.200 TOBACCO DEPENDENCE SYNDROME: Status: ACTIVE | Noted: 2022-08-03

## 2022-08-03 PROBLEM — IMO0002 HYPOGONADISM: Status: ACTIVE | Noted: 2022-08-03

## 2022-08-03 PROBLEM — E78.2 MIXED HYPERLIPIDEMIA: Status: ACTIVE | Noted: 2022-08-03

## 2022-08-03 PROCEDURE — 99214 OFFICE O/P EST MOD 30 MIN: CPT

## 2022-08-03 RX ORDER — DIAZEPAM 5 MG/1
5 TABLET ORAL EVERY 6 HOURS PRN
Qty: 30 TABLET | Refills: 0 | Status: SHIPPED | OUTPATIENT
Start: 2022-08-03

## 2022-08-03 NOTE — PROGRESS NOTES
Assessment/Plan:         Problem List Items Addressed This Visit        Endocrine    Diabetes mellitus, type 2 ([facility] [address] ) - Primary       Lab Results   Component Value Date    HGBA1C 8 0 (H) 07/12/2022   Under control  Continue current medication  We will re-evaluate at next office visit  Cardiovascular and Mediastinum    HTN (hypertension)     Under control  Continue current medication  We will re-evaluate at next office visit  Atherosclerosis of coronary artery without angina pectoris     Under control  Continue current medication  We will re-evaluate at next office visit  Peripheral arterial occlusive disease ([facility] )     Under control  Continue current medication  We will re-evaluate at next office visit  Other Visit Diagnoses     BMI 30 0-30 9,adult                Subjective:      Patient ID: Veronika Swan is a [de-identified] y o  male  The patient was delivered hospital overnight for chest pain workup was negative it does happen on July 12th and discharged on July 13  Patient here for review of chronic medical problems and review of the labs and imaging if it is applicable  Currently has no specific complaints other than mentioned in the review of systems  Denies chest pain, SOB, cough, abdominal pain, nausea, vomiting, fever, chills, lightheadedness, dizziness,headache, tingling or numbness  No bowel or bladder problem  The following portions of the patient's history were reviewed and updated as appropriate:   Past Medical History:  He has a past medical history of Bladder polyps, Colon polyp, Coronary artery disease, Diabetes mellitus ([facility] [address] ), DJD (degenerative joint disease), Ear problems, HL (hearing loss), Hyperlipidemia, Hypertension, Kidney stone, Myocardial infarction ([facility] [address] ), Obesity, Osteoporosis, and Tinnitus  ,  _______________________________________________________________________  Medical Problems:  does not have any pertinent problems on file ,  _______________________________________________________________________  Past Surgical History:   has a past surgical history that includes Myringotomy w/ tubes; Tonsillectomy; Cholecystectomy; Cataract extraction (11/15/2020); Colonoscopy; Coronary angioplasty with stent; pr cysto/uretero w/lithotripsy &indwell stent insrt (Right, 04/20/2021); FL retrograde pyelogram (04/20/2021); [place] tooth extraction; Bladder surgery; Cardiac catheterization; and Cataract extraction (12/03/2020)  ,  _______________________________________________________________________  Family History:  family history includes Cancer in his father; Heart disease in his mother ,  _______________________________________________________________________  Social History:   reports that he has been smoking cigarettes  He has a 25 00 pack-year smoking history  He has never used smokeless tobacco  He reports previous alcohol use  He reports that he does not use drugs  ,  _______________________________________________________________________  Allergies:  is allergic to penicillins     _______________________________________________________________________  Current Outpatient Medications   Medication Sig Dispense Refill    ASPIRIN PO Take 81 mg by mouth in the morning      clopidogrel (PLAVIX) 75 mg tablet Daily Last dose 4/15/21      Dulaglutide 1 5 MG/0 5ML SOPN once a week      finasteride (PROSCAR) 5 mg tablet Daily      furosemide (LASIX) 20 mg tablet furosemide 20 mg tablet   TAKE ONE TABLET BY MOUTH EVERY DAY      glipiZIDE (GLUCOTROL XL) 5 mg 24 hr tablet glipizide ER 5 mg tablet, extended release 24 hr   TAKE ONE TABLET BY MOUTH EVERY DAY      GLUCOSAMINE-CHONDROITIN PO Take by mouth in the morning      losartan (COZAAR) 50 mg tablet daily at bedtime       metFORMIN (GLUCOPHAGE) 1000 MG tablet Take 1 tablet (1,000 mg total) by mouth 2 (two) times a day with meals 180 tablet 0    metoprolol succinate (TOPROL-XL) 50 mg 24 hr tablet 25 mg daily       potassium chloride (K-DUR,KLOR-CON) 20 mEq tablet potassium chloride ER 20 mEq tablet,extended release(part/cryst)   TAKE ONE TABLET BY MOUTH EVERY DAY      rosuvastatin (CRESTOR) 10 MG tablet Daily      tamsulosin (FLOMAX) 0 4 mg daily with dinner       Testosterone Cypionate 200 MG/ML SOLN testosterone cypionate 200 mg/mL intramuscular oil   INJECT 0 25ML INTRAMUSCULARLY ONCE WEEKLY AS DIRECTED       No current facility-administered medications for this visit      _______________________________________________________________________  Review of Systems   Constitutional: Negative for chills, fatigue and fever  HENT: Negative for congestion, ear pain, rhinorrhea, sneezing and sore throat  Eyes: Negative for pain, redness and visual disturbance  Respiratory: Negative for cough, chest tightness and shortness of breath  Cardiovascular: Negative for chest pain, palpitations and leg swelling  Gastrointestinal: Negative for abdominal pain, blood in stool, diarrhea, nausea and vomiting  Endocrine: Negative for cold intolerance and heat intolerance  Genitourinary: Negative for dysuria, frequency and hematuria  Musculoskeletal: Negative for arthralgias and back pain  Skin: Negative for color change and rash  Neurological: Negative for dizziness, weakness, light-headedness, numbness and headaches  Hematological: Does not bruise/bleed easily  Psychiatric/Behavioral: Negative for behavioral problems and confusion  Objective:  Vitals:    08/03/22 1258   BP: 140/73   BP Location: Right arm   Patient Position: Sitting   Cuff Size: Standard   Pulse: 81   Resp: 18   Temp: (!) 97 °F (36 1 °C)   TempSrc: Tympanic   SpO2: 97%   Weight: 94 9 kg (209 lb 3 2 oz)   Height: 5' 9" (1 753 m)     Body mass index is 30 89 kg/m²  Physical Exam  Constitutional:       General: He is not in acute distress  Appearance: Normal appearance   He is not ill-appearing, toxic-appearing or diaphoretic  HENT:      Head: Normocephalic and atraumatic  Right Ear: Tympanic membrane normal       Left Ear: Tympanic membrane normal       Nose: Nose normal  No congestion  Mouth/Throat:      Mouth: Mucous membranes are moist    Eyes:      General: No scleral icterus  Right eye: No discharge  Left eye: No discharge  Extraocular Movements: Extraocular movements intact  Conjunctiva/sclera: Conjunctivae normal       Pupils: Pupils are equal, round, and reactive to light  Cardiovascular:      Rate and Rhythm: Normal rate and regular rhythm  Pulses: Normal pulses  no weak pulses          Dorsalis pedis pulses are 2+ on the right side and 2+ on the left side  Heart sounds: Normal heart sounds  No murmur heard  No gallop  Pulmonary:      Effort: Pulmonary effort is normal  No respiratory distress  Breath sounds: Normal breath sounds  No wheezing, rhonchi or rales  Abdominal:      General: Abdomen is flat  Bowel sounds are normal  There is no distension  Palpations: Abdomen is soft  Tenderness: There is no abdominal tenderness  There is no guarding  Musculoskeletal:         General: No swelling or tenderness  Normal range of motion  Cervical back: Normal range of motion and neck supple  No rigidity  Feet:      Right foot:      Skin integrity: Callus and dry skin present  No ulcer, skin breakdown, erythema or warmth  Left foot:      Skin integrity: Callus and dry skin present  No ulcer, skin breakdown, erythema or warmth  Lymphadenopathy:      Cervical: No cervical adenopathy  Skin:     General: Skin is warm  Capillary Refill: Capillary refill takes 2 to 3 seconds  Coloration: Skin is not jaundiced  Findings: No bruising or rash  Neurological:      General: No focal deficit present  Mental Status: He is alert and oriented to person, place, and time  Mental status is at baseline        Gait: Gait normal  Psychiatric:         Mood and Affect: Mood normal        BMI Counseling: Body mass index is 30 89 kg/m²  The BMI is above normal  Nutrition recommendations include decreasing portion sizes, decreasing fast food intake, limiting drinks that contain sugar, moderation in carbohydrate intake, increasing intake of lean protein and reducing intake of saturated and trans fat  Exercise recommendations include vigorous physical activity 75 minutes/week  No pharmacotherapy was ordered  Rationale for BMI follow-up plan is due to patient being overweight or obese  Tobacco Cessation Counseling: Tobacco cessation counseling was provided  The patient is sincerely urged to quit consumption of tobacco  He is not ready to quit tobacco  Medication options and side effects of medication discussed  Patient refused medication  Patient's shoes and socks removed  Right Foot/Ankle   Right Foot Inspection  Skin Exam: skin normal, skin intact, dry skin, callus and callus  No warmth, no erythema, no maceration, no abnormal color, no pre-ulcer and no ulcer  Toe Exam: ROM and strength within normal limits  No swelling and erythema    Sensory   Monofilament testing: intact    Vascular  Capillary refills: < 3 seconds  The right DP pulse is 2+  Left Foot/Ankle  Left Foot Inspection  Skin Exam: skin normal, skin intact, dry skin and callus  No warmth, no erythema, no maceration, normal color, no pre-ulcer and no ulcer  Toe Exam: ROM and strength within normal limits  No swelling and no erythema  Sensory   Monofilament testing: intact    Vascular  Capillary refills: < 3 seconds  The left DP pulse is 2+       Assign Risk Category  No deformity present  No loss of protective sensation  No weak pulses  Risk: 0

## 2022-08-03 NOTE — PATIENT INSTRUCTIONS
Frequent home monitor of blood pressure  Diet high in fruit and vegetables and low in salt and cholesterol  Regular cardiovascular exercise  Take all medications regularly as prescribed  Loose weight   Potential consequences of obesity explained to patient  Quit smoking  Potential consequences of smoking discussed with patient

## 2022-08-03 NOTE — ASSESSMENT & PLAN NOTE
Lab Results   Component Value Date    HGBA1C 8 0 (H) 07/12/2022   Under control  Continue current medication  We will re-evaluate at next office visit

## 2022-08-28 DIAGNOSIS — N40.0 BENIGN PROSTATIC HYPERPLASIA WITHOUT LOWER URINARY TRACT SYMPTOMS: Primary | ICD-10-CM

## 2022-08-29 RX ORDER — TAMSULOSIN HYDROCHLORIDE 0.4 MG/1
CAPSULE ORAL
Qty: 90 CAPSULE | Refills: 0 | Status: SHIPPED | OUTPATIENT
Start: 2022-08-29

## 2022-09-04 DIAGNOSIS — E78.2 MIXED HYPERLIPIDEMIA: Primary | ICD-10-CM

## 2022-09-04 DIAGNOSIS — I10 BENIGN ESSENTIAL HYPERTENSION: ICD-10-CM

## 2022-09-06 RX ORDER — LOSARTAN POTASSIUM 50 MG/1
TABLET ORAL
Qty: 90 TABLET | Refills: 0 | Status: SHIPPED | OUTPATIENT
Start: 2022-09-06

## 2022-09-06 RX ORDER — ROSUVASTATIN CALCIUM 10 MG/1
TABLET, COATED ORAL
Qty: 90 TABLET | Refills: 0 | Status: SHIPPED | OUTPATIENT
Start: 2022-09-06

## 2022-09-11 DIAGNOSIS — R60.0 EDEMA OF BOTH LOWER LEGS: Primary | ICD-10-CM

## 2022-09-12 RX ORDER — FUROSEMIDE 20 MG/1
TABLET ORAL
Qty: 90 TABLET | Refills: 0 | Status: SHIPPED | OUTPATIENT
Start: 2022-09-12

## 2022-09-25 DIAGNOSIS — E34.9 TESTOSTERONE DEFICIENCY: Primary | ICD-10-CM

## 2022-09-25 DIAGNOSIS — I25.10 ATHEROSCLEROSIS OF NATIVE CORONARY ARTERY OF NATIVE HEART WITHOUT ANGINA PECTORIS: ICD-10-CM

## 2022-09-27 RX ORDER — CLOPIDOGREL BISULFATE 75 MG/1
TABLET ORAL
Qty: 90 TABLET | Refills: 0 | Status: SHIPPED | OUTPATIENT
Start: 2022-09-27

## 2022-09-27 RX ORDER — TESTOSTERONE CYPIONATE 200 MG/ML
INJECTION INTRAMUSCULAR
Qty: 3 ML | Refills: 0 | Status: SHIPPED | OUTPATIENT
Start: 2022-09-27

## 2022-10-16 DIAGNOSIS — E87.6 HYPOKALEMIA: Primary | ICD-10-CM

## 2022-10-16 DIAGNOSIS — E11.51 TYPE 2 DIABETES MELLITUS WITH DIABETIC PERIPHERAL ANGIOPATHY WITHOUT GANGRENE, WITHOUT LONG-TERM CURRENT USE OF INSULIN (HCC): ICD-10-CM

## 2022-10-17 ENCOUNTER — VBI (OUTPATIENT)
Dept: ADMINISTRATIVE | Facility: OTHER | Age: 82
End: 2022-10-17

## 2022-10-17 RX ORDER — POTASSIUM CHLORIDE 20 MEQ/1
TABLET, EXTENDED RELEASE ORAL
Qty: 90 TABLET | Refills: 0 | Status: SHIPPED | OUTPATIENT
Start: 2022-10-17

## 2022-10-17 RX ORDER — GLIPIZIDE 5 MG/1
TABLET, FILM COATED, EXTENDED RELEASE ORAL
Qty: 90 TABLET | Refills: 0 | Status: SHIPPED | OUTPATIENT
Start: 2022-10-17

## 2022-10-25 ENCOUNTER — OFFICE VISIT (OUTPATIENT)
Dept: FAMILY MEDICINE CLINIC | Facility: CLINIC | Age: 82
End: 2022-10-25
Payer: MEDICARE

## 2022-10-25 VITALS
BODY MASS INDEX: 30.39 KG/M2 | DIASTOLIC BLOOD PRESSURE: 70 MMHG | HEART RATE: 74 BPM | WEIGHT: 205.2 LBS | RESPIRATION RATE: 16 BRPM | HEIGHT: 69 IN | OXYGEN SATURATION: 98 % | SYSTOLIC BLOOD PRESSURE: 130 MMHG | TEMPERATURE: 98.7 F

## 2022-10-25 DIAGNOSIS — J00 COMMON COLD: Primary | ICD-10-CM

## 2022-10-25 PROCEDURE — 99213 OFFICE O/P EST LOW 20 MIN: CPT

## 2022-10-25 NOTE — PROGRESS NOTES
Assessment/Plan:         Problem List Items Addressed This Visit    None     Visit Diagnoses     Common cold    -  Primary            Subjective:      Patient ID: Jose Mcmahon is a [de-identified] y o  male  Patient here for sick visit complaining of cough with expectoration for about 1 week he had some wheezing but that is gone no fever no chills no chest pain or shortness of breath no nasal congestion no sore throat no headache no lightheadedness or dizziness no hemoptysis  Patient started taking Mucinex DM and which is helping him a lot  Home COVID test is negative      The following portions of the patient's history were reviewed and updated as appropriate:   Past Medical History:  He has a past medical history of Bladder polyps, Colon polyp, Coronary artery disease, Diabetes mellitus ([facility] [address] ), DJD (degenerative joint disease), Ear problems, HL (hearing loss), Hyperlipidemia, Hypertension, Kidney stone, Myocardial infarction ([facility] [address] ), Obesity, Osteoporosis, and Tinnitus  ,  _______________________________________________________________________  Medical Problems:  does not have any pertinent problems on file ,  _______________________________________________________________________  Past Surgical History:   has a past surgical history that includes Myringotomy w/ tubes; Tonsillectomy; Cholecystectomy; Cataract extraction (11/15/2020); Colonoscopy; Coronary angioplasty with stent; pr cysto/uretero w/lithotripsy &indwell stent insrt (Right, 04/20/2021); FL retrograde pyelogram (04/20/2021); [place] tooth extraction; Bladder surgery; Cardiac catheterization; and Cataract extraction (12/03/2020)  ,  _______________________________________________________________________  Family History:  family history includes Cancer in his father; Heart disease in his mother ,  _______________________________________________________________________  Social History:   reports that he has been smoking cigarettes   He has a 25 00 pack-year smoking history  He has never used smokeless tobacco  He reports previous alcohol use  He reports that he does not use drugs  ,  _______________________________________________________________________  Allergies:  is allergic to penicillins     _______________________________________________________________________  Current Outpatient Medications   Medication Sig Dispense Refill   • ASPIRIN PO Take 81 mg by mouth in the morning     • clopidogrel (PLAVIX) 75 mg tablet TAKE ONE TABLET BY MOUTH EVERY DAY 90 tablet 0   • diazepam (Valium) 5 mg tablet Take 1 tablet (5 mg total) by mouth every 6 (six) hours as needed for anxiety 30 tablet 0   • Dulaglutide 1 5 MG/0 5ML SOPN once a week     • finasteride (PROSCAR) 5 mg tablet Daily     • furosemide (LASIX) 20 mg tablet TAKE ONE TABLET BY MOUTH EVERY DAY 90 tablet 0   • glipiZIDE (GLUCOTROL XL) 5 mg 24 hr tablet TAKE ONE TABLET BY MOUTH EVERY DAY 90 tablet 0   • GLUCOSAMINE-CHONDROITIN PO Take by mouth in the morning     • losartan (COZAAR) 50 mg tablet TAKE ONE TABLET BY MOUTH EVERY DAY 90 tablet 0   • metFORMIN (GLUCOPHAGE) 1000 MG tablet TAKE ONE TABLET BY MOUTH TWICE A DAY WITH MEALS 180 tablet 0   • metoprolol succinate (TOPROL-XL) 50 mg 24 hr tablet 25 mg daily      • potassium chloride (K-DUR,KLOR-CON) 20 mEq tablet TAKE ONE TABLET BY MOUTH EVERY DAY 90 tablet 0   • rosuvastatin (CRESTOR) 10 MG tablet TAKE ONE TABLET BY MOUTH EVERY DAY AT BEDTIME 90 tablet 0   • tamsulosin (FLOMAX) 0 4 mg TAKE ONE CAPSULE BY MOUTH EVERY DAY 90 capsule 0   • testosterone cypionate (DEPO-TESTOSTERONE) 200 mg/mL SOLN INJECT 0 25ML INTRAMUSCULARLY ONCE WEEKLY AS DIRECTED 3 mL 0   • Testosterone Cypionate 200 MG/ML SOLN testosterone cypionate 200 mg/mL intramuscular oil   INJECT 0 25ML INTRAMUSCULARLY ONCE WEEKLY AS DIRECTED       No current facility-administered medications for this visit      _______________________________________________________________________  Review of Systems Constitutional: Negative for chills, fatigue and fever  HENT: Negative for congestion, ear pain, rhinorrhea, sneezing and sore throat  Eyes: Negative for redness, itching and visual disturbance  Respiratory: Positive for cough  Negative for chest tightness and shortness of breath  Cardiovascular: Negative for chest pain, palpitations and leg swelling  Gastrointestinal: Negative for abdominal pain, blood in stool, diarrhea, nausea and vomiting  Endocrine: Negative for cold intolerance and heat intolerance  Genitourinary: Negative for dysuria, frequency and urgency  Musculoskeletal: Negative for arthralgias, back pain and myalgias  Skin: Negative for color change and rash  Neurological: Negative for dizziness, weakness, light-headedness, numbness and headaches  Hematological: Does not bruise/bleed easily  Psychiatric/Behavioral: Negative for agitation, behavioral problems and confusion  Objective:  Vitals:    10/25/22 1212   BP: 130/70   BP Location: Left arm   Patient Position: Sitting   Cuff Size: Large   Pulse: 74   Resp: 16   Temp: 98 7 °F (37 1 °C)   TempSrc: Temporal   SpO2: 98%   Weight: 93 1 kg (205 lb 3 2 oz)   Height: 5' 9" (1 753 m)     Body mass index is 30 3 kg/m²  Physical Exam  Vitals and nursing note reviewed  Constitutional:       General: He is not in acute distress  Appearance: Normal appearance  He is not ill-appearing, toxic-appearing or diaphoretic  HENT:      Head: Normocephalic and atraumatic  Right Ear: Tympanic membrane normal       Left Ear: Tympanic membrane normal       Nose: Nose normal  No congestion  Mouth/Throat:      Mouth: Mucous membranes are moist    Eyes:      General: No scleral icterus  Right eye: No discharge  Left eye: No discharge  Extraocular Movements: Extraocular movements intact  Conjunctiva/sclera: Conjunctivae normal       Pupils: Pupils are equal, round, and reactive to light  Cardiovascular:      Rate and Rhythm: Normal rate and regular rhythm  Pulses: Normal pulses  Heart sounds: Normal heart sounds  No murmur heard  No gallop  Pulmonary:      Effort: Pulmonary effort is normal  No respiratory distress  Breath sounds: Normal breath sounds  No wheezing, rhonchi or rales  Abdominal:      General: Abdomen is flat  Bowel sounds are normal  There is no distension  Palpations: Abdomen is soft  Tenderness: There is no abdominal tenderness  There is no guarding  Musculoskeletal:         General: No swelling or tenderness  Normal range of motion  Cervical back: Normal range of motion and neck supple  No rigidity  Lymphadenopathy:      Cervical: No cervical adenopathy  Skin:     General: Skin is warm  Capillary Refill: Capillary refill takes 2 to 3 seconds  Coloration: Skin is not jaundiced  Findings: No bruising or rash  Neurological:      General: No focal deficit present  Mental Status: He is alert and oriented to person, place, and time  Mental status is at baseline        Gait: Gait normal    Psychiatric:         Mood and Affect: Mood normal

## 2022-10-25 NOTE — PATIENT INSTRUCTIONS
Plenty of fluid orally  Tylenol 650 mg po q 6 hours as needed for pain or fever  If not improving or get worse then get back to us or go to ER

## 2022-11-04 LAB — HBA1C MFR BLD HPLC: 6.6 %

## 2022-11-08 ENCOUNTER — OFFICE VISIT (OUTPATIENT)
Dept: FAMILY MEDICINE CLINIC | Facility: CLINIC | Age: 82
End: 2022-11-08

## 2022-11-08 VITALS
RESPIRATION RATE: 16 BRPM | DIASTOLIC BLOOD PRESSURE: 70 MMHG | WEIGHT: 203.6 LBS | TEMPERATURE: 98.7 F | BODY MASS INDEX: 30.16 KG/M2 | SYSTOLIC BLOOD PRESSURE: 140 MMHG | HEART RATE: 80 BPM | OXYGEN SATURATION: 97 % | HEIGHT: 69 IN

## 2022-11-08 DIAGNOSIS — E11.9 TYPE 2 DIABETES MELLITUS WITHOUT COMPLICATION, WITHOUT LONG-TERM CURRENT USE OF INSULIN (HCC): ICD-10-CM

## 2022-11-08 DIAGNOSIS — E78.2 MIXED HYPERLIPIDEMIA: ICD-10-CM

## 2022-11-08 DIAGNOSIS — I10 HTN (HYPERTENSION), BENIGN: Primary | ICD-10-CM

## 2022-11-08 DIAGNOSIS — I25.10 ATHEROSCLEROSIS OF NATIVE CORONARY ARTERY OF NATIVE HEART WITHOUT ANGINA PECTORIS: ICD-10-CM

## 2022-11-08 DIAGNOSIS — I77.9 PERIPHERAL ARTERIAL OCCLUSIVE DISEASE (HCC): ICD-10-CM

## 2022-11-08 DIAGNOSIS — F17.200 TOBACCO DEPENDENCE SYNDROME: ICD-10-CM

## 2022-11-08 NOTE — ASSESSMENT & PLAN NOTE
Under control  Continue current medication  We will re-evaluate at next office visit      Lab Results   Component Value Date    HGBA1C 6 6 (H) 11/04/2022

## 2022-11-08 NOTE — PROGRESS NOTES
Assessment/Plan:         Problem List Items Addressed This Visit        Endocrine    Type 2 diabetes mellitus without complication, without long-term current use of insulin ([facility] [address] )     Under control  Continue current medication  We will re-evaluate at next office visit  Lab Results   Component Value Date    HGBA1C 6 6 (H) 11/04/2022               Cardiovascular and Mediastinum    HTN (hypertension), benign - Primary     Under control  Continue current medication  We will re-evaluate at next office visit  Atherosclerosis of coronary artery without angina pectoris     Under control  Continue current medication  We will re-evaluate at next office visit  Peripheral arterial occlusive disease ([facility] [address] )     Under control  Continue current medication  We will re-evaluate at next office visit  Other    Mixed hyperlipidemia     Under control  Continue current medication  We will re-evaluate at next office visit  Tobacco dependence syndrome     Quit smoking  Potential consequences of smoking discussed with patient                     Subjective:      Patient ID: Merly Souza is a [de-identified] y o  male  Patient here for review of chronic medical problems and review of the labs and imaging if it is applicable  Currently has no specific complaints other than mentioned in the review of systems  Denies chest pain, SOB, cough, abdominal pain, nausea, vomiting, fever, chills, lightheadedness, dizziness,headache, tingling or numbness  No bowel or bladder problem          The following portions of the patient's history were reviewed and updated as appropriate:   Past Medical History:  He has a past medical history of Bladder polyps, Colon polyp, Coronary artery disease, Diabetes mellitus ([address] ), DJD (degenerative joint disease), Ear problems, HL (hearing loss), Hyperlipidemia, Hypertension, Kidney stone, Myocardial infarction ([address] ), Obesity, Osteoporosis, and Tinnitus  ,  _______________________________________________________________________  Medical Problems:  does not have any pertinent problems on file ,  _______________________________________________________________________  Past Surgical History:   has a past surgical history that includes Myringotomy w/ tubes; Tonsillectomy; Cholecystectomy; Cataract extraction (11/15/2020); Colonoscopy; Coronary angioplasty with stent; pr cysto/uretero w/lithotripsy &indwell stent insrt (Right, 04/20/2021); FL retrograde pyelogram (04/20/2021); Golden tooth extraction; Bladder surgery; Cardiac catheterization; and Cataract extraction (12/03/2020)  ,  _______________________________________________________________________  Family History:  family history includes Cancer in his father; Heart disease in his mother ,  _______________________________________________________________________  Social History:   reports that he has been smoking cigarettes  He has a 25 00 pack-year smoking history  He has never used smokeless tobacco  He reports previous alcohol use  He reports that he does not use drugs  ,  _______________________________________________________________________  Allergies:  is allergic to penicillins     _______________________________________________________________________  Current Outpatient Medications   Medication Sig Dispense Refill   • ASPIRIN PO Take 81 mg by mouth in the morning     • clopidogrel (PLAVIX) 75 mg tablet TAKE ONE TABLET BY MOUTH EVERY DAY 90 tablet 0   • diazepam (Valium) 5 mg tablet Take 1 tablet (5 mg total) by mouth every 6 (six) hours as needed for anxiety 30 tablet 0   • Dulaglutide 1 5 MG/0 5ML SOPN once a week     • finasteride (PROSCAR) 5 mg tablet Daily     • furosemide (LASIX) 20 mg tablet TAKE ONE TABLET BY MOUTH EVERY DAY 90 tablet 0   • glipiZIDE (GLUCOTROL XL) 5 mg 24 hr tablet TAKE ONE TABLET BY MOUTH EVERY DAY 90 tablet 0   • GLUCOSAMINE-CHONDROITIN PO Take by mouth in the morning     • losartan (COZAAR) 50 mg tablet TAKE ONE TABLET BY MOUTH EVERY DAY 90 tablet 0   • metFORMIN (GLUCOPHAGE) 1000 MG tablet TAKE ONE TABLET BY MOUTH TWICE A DAY WITH MEALS 180 tablet 0   • metoprolol succinate (TOPROL-XL) 50 mg 24 hr tablet 25 mg daily      • potassium chloride (K-DUR,KLOR-CON) 20 mEq tablet TAKE ONE TABLET BY MOUTH EVERY DAY 90 tablet 0   • rosuvastatin (CRESTOR) 10 MG tablet TAKE ONE TABLET BY MOUTH EVERY DAY AT BEDTIME 90 tablet 0   • tamsulosin (FLOMAX) 0 4 mg TAKE ONE CAPSULE BY MOUTH EVERY DAY 90 capsule 0   • testosterone cypionate (DEPO-TESTOSTERONE) 200 mg/mL SOLN INJECT 0 25ML INTRAMUSCULARLY ONCE WEEKLY AS DIRECTED 3 mL 0     No current facility-administered medications for this visit      _______________________________________________________________________  Review of Systems   Constitutional: Negative for chills, fatigue and fever  HENT: Negative for congestion, ear pain, rhinorrhea, sneezing and sore throat  Eyes: Negative for redness, itching and visual disturbance  Respiratory: Negative for cough, chest tightness and shortness of breath  Cardiovascular: Negative for chest pain, palpitations and leg swelling  Gastrointestinal: Negative for abdominal pain, blood in stool, diarrhea, nausea and vomiting  Endocrine: Negative for cold intolerance and heat intolerance  Genitourinary: Negative for dysuria, frequency and urgency  Musculoskeletal: Positive for arthralgias and myalgias  Negative for back pain  Skin: Negative for color change and rash  Neurological: Negative for dizziness, weakness, light-headedness, numbness and headaches  Hematological: Does not bruise/bleed easily  Psychiatric/Behavioral: Negative for agitation, behavioral problems and confusion           Objective:  Vitals:    11/08/22 1042   BP: 140/70   BP Location: Left arm   Patient Position: Sitting   Cuff Size: Large   Pulse: 80   Resp: 16   Temp: 98 7 °F (37 1 °C)   TempSrc: Temporal   SpO2: 97% Weight: 92 4 kg (203 lb 9 6 oz)   Height: 5' 9" (1 753 m)     Body mass index is 30 07 kg/m²  Physical Exam  Vitals and nursing note reviewed  Constitutional:       General: He is not in acute distress  Appearance: Normal appearance  He is not ill-appearing, toxic-appearing or diaphoretic  HENT:      Head: Normocephalic and atraumatic  Right Ear: Tympanic membrane normal       Left Ear: Tympanic membrane normal       Nose: Nose normal  No congestion  Mouth/Throat:      Mouth: Mucous membranes are moist    Eyes:      General: No scleral icterus  Right eye: No discharge  Left eye: No discharge  Extraocular Movements: Extraocular movements intact  Conjunctiva/sclera: Conjunctivae normal       Pupils: Pupils are equal, round, and reactive to light  Cardiovascular:      Rate and Rhythm: Normal rate and regular rhythm  Pulses: Normal pulses  Heart sounds: Normal heart sounds  No murmur heard  No gallop  Pulmonary:      Effort: Pulmonary effort is normal  No respiratory distress  Breath sounds: Normal breath sounds  No wheezing, rhonchi or rales  Abdominal:      General: Abdomen is flat  Bowel sounds are normal  There is no distension  Palpations: Abdomen is soft  Tenderness: There is no abdominal tenderness  There is no guarding  Musculoskeletal:         General: No swelling or tenderness  Normal range of motion  Cervical back: Normal range of motion and neck supple  No rigidity  Lymphadenopathy:      Cervical: No cervical adenopathy  Skin:     General: Skin is warm  Capillary Refill: Capillary refill takes 2 to 3 seconds  Coloration: Skin is not jaundiced  Findings: No bruising or rash  Neurological:      General: No focal deficit present  Mental Status: He is alert and oriented to person, place, and time  Mental status is at baseline        Gait: Gait normal    Psychiatric:         Mood and Affect: Mood normal

## 2022-12-04 DIAGNOSIS — N40.0 BENIGN PROSTATIC HYPERPLASIA WITHOUT LOWER URINARY TRACT SYMPTOMS: ICD-10-CM

## 2022-12-04 DIAGNOSIS — I10 BENIGN ESSENTIAL HYPERTENSION: ICD-10-CM

## 2022-12-04 DIAGNOSIS — E78.2 MIXED HYPERLIPIDEMIA: ICD-10-CM

## 2022-12-05 RX ORDER — ROSUVASTATIN CALCIUM 10 MG/1
TABLET, COATED ORAL
Qty: 90 TABLET | Refills: 0 | Status: SHIPPED | OUTPATIENT
Start: 2022-12-05

## 2022-12-05 RX ORDER — LOSARTAN POTASSIUM 50 MG/1
TABLET ORAL
Qty: 90 TABLET | Refills: 0 | Status: SHIPPED | OUTPATIENT
Start: 2022-12-05

## 2022-12-05 RX ORDER — TAMSULOSIN HYDROCHLORIDE 0.4 MG/1
CAPSULE ORAL
Qty: 90 CAPSULE | Refills: 0 | Status: SHIPPED | OUTPATIENT
Start: 2022-12-05

## 2022-12-11 DIAGNOSIS — I10 BENIGN ESSENTIAL HYPERTENSION: Primary | ICD-10-CM

## 2022-12-12 RX ORDER — METOPROLOL SUCCINATE 50 MG/1
TABLET, EXTENDED RELEASE ORAL
Qty: 90 TABLET | Refills: 0 | Status: SHIPPED | OUTPATIENT
Start: 2022-12-12

## 2022-12-18 DIAGNOSIS — R60.0 EDEMA OF BOTH LOWER LEGS: ICD-10-CM

## 2022-12-19 RX ORDER — FUROSEMIDE 20 MG/1
TABLET ORAL
Qty: 90 TABLET | Refills: 0 | Status: SHIPPED | OUTPATIENT
Start: 2022-12-19

## 2022-12-25 DIAGNOSIS — E34.9 TESTOSTERONE DEFICIENCY: ICD-10-CM

## 2022-12-25 DIAGNOSIS — I25.10 ATHEROSCLEROSIS OF NATIVE CORONARY ARTERY OF NATIVE HEART WITHOUT ANGINA PECTORIS: ICD-10-CM

## 2022-12-28 RX ORDER — TESTOSTERONE CYPIONATE 200 MG/ML
INJECTION INTRAMUSCULAR
Qty: 3 ML | Refills: 0 | Status: SHIPPED | OUTPATIENT
Start: 2022-12-28

## 2022-12-28 RX ORDER — CLOPIDOGREL BISULFATE 75 MG/1
TABLET ORAL
Qty: 90 TABLET | Refills: 0 | Status: SHIPPED | OUTPATIENT
Start: 2022-12-28

## 2022-12-30 ENCOUNTER — OFFICE VISIT (OUTPATIENT)
Dept: FAMILY MEDICINE CLINIC | Facility: CLINIC | Age: 82
End: 2022-12-30

## 2022-12-30 VITALS
WEIGHT: 206.6 LBS | TEMPERATURE: 97.7 F | HEART RATE: 92 BPM | RESPIRATION RATE: 18 BRPM | OXYGEN SATURATION: 99 % | DIASTOLIC BLOOD PRESSURE: 64 MMHG | BODY MASS INDEX: 30.6 KG/M2 | SYSTOLIC BLOOD PRESSURE: 118 MMHG | HEIGHT: 69 IN

## 2022-12-30 DIAGNOSIS — I77.9 PERIPHERAL ARTERIAL OCCLUSIVE DISEASE (HCC): ICD-10-CM

## 2022-12-30 DIAGNOSIS — E55.9 VITAMIN D DEFICIENCY: ICD-10-CM

## 2022-12-30 DIAGNOSIS — Z00.00 MEDICARE ANNUAL WELLNESS VISIT, SUBSEQUENT: Primary | ICD-10-CM

## 2022-12-30 DIAGNOSIS — I25.10 ATHEROSCLEROSIS OF NATIVE CORONARY ARTERY OF NATIVE HEART WITHOUT ANGINA PECTORIS: ICD-10-CM

## 2022-12-30 DIAGNOSIS — E11.9 TYPE 2 DIABETES MELLITUS WITHOUT COMPLICATION, WITHOUT LONG-TERM CURRENT USE OF INSULIN (HCC): ICD-10-CM

## 2022-12-30 DIAGNOSIS — E11.51 TYPE 2 DIABETES MELLITUS WITH DIABETIC PERIPHERAL ANGIOPATHY WITHOUT GANGRENE, WITHOUT LONG-TERM CURRENT USE OF INSULIN (HCC): ICD-10-CM

## 2022-12-30 DIAGNOSIS — I10 HTN (HYPERTENSION), BENIGN: ICD-10-CM

## 2022-12-30 DIAGNOSIS — E34.9 TESTOSTERONE DEFICIENCY: ICD-10-CM

## 2022-12-30 DIAGNOSIS — E78.2 MIXED HYPERLIPIDEMIA: ICD-10-CM

## 2022-12-30 DIAGNOSIS — F17.200 TOBACCO DEPENDENCE SYNDROME: ICD-10-CM

## 2022-12-30 PROBLEM — I21.9 MI (MYOCARDIAL INFARCTION) (HCC): Status: RESOLVED | Noted: 2021-04-20 | Resolved: 2022-12-30

## 2022-12-30 PROBLEM — I25.2 HISTORY OF MYOCARDIAL INFARCTION: Status: ACTIVE | Noted: 2022-12-30

## 2022-12-30 NOTE — PROGRESS NOTES
Assessment and Plan:     Problem List Items Addressed This Visit        Endocrine    Type 2 diabetes mellitus without complication, without long-term current use of insulin ([facility] )       Lab Results   Component Value Date    HGBA1C 6 6 (H) 11/04/2022   Under control  Continue current medication  We will re-evaluate at next office visit  Relevant Orders    Hemoglobin A1C    Microalbumin / creatinine urine ratio    Type 2 diabetes mellitus with diabetic peripheral angiopathy without gangrene, without long-term current use of insulin ([facility] )       Cardiovascular and Mediastinum    HTN (hypertension), benign     Under control  Continue current medication  We will re-evaluate at next office visit  Relevant Orders    CBC and differential    Comprehensive metabolic panel    UA w Reflex to Microscopic w Reflex to Culture    Atherosclerosis of coronary artery without angina pectoris     Under control  Continue current medication  We will re-evaluate at next office visit  Peripheral arterial occlusive disease ([facility] )     Under control  Continue current medication  We will re-evaluate at next office visit  Other    Mixed hyperlipidemia     Under control  Continue current medication  We will re-evaluate at next office visit  Relevant Orders    Lipid Panel with Direct LDL reflex    Tobacco dependence syndrome     Under control  Continue current medication  We will re-evaluate at next office visit  Testosterone deficiency    Relevant Orders    Testosterone, free, total   Other Visit Diagnoses     Medicare annual wellness visit, subsequent    -  Primary    Vitamin D deficiency        Relevant Orders    Vitamin D 25 hydroxy        BMI Counseling: Body mass index is 30 51 kg/m²   The BMI is above normal  Nutrition recommendations include decreasing portion sizes, decreasing fast food intake, limiting drinks that contain sugar, moderation in carbohydrate intake, increasing intake of lean protein and reducing intake of saturated and trans fat  Exercise recommendations include vigorous physical activity 75 minutes/week  No pharmacotherapy was ordered  Rationale for BMI follow-up plan is due to patient being overweight or obese  Depression Screening and Follow-up Plan: Patient was screened for depression during today's encounter  They screened negative with a PHQ-2 score of 1  Tobacco Cessation Counseling: Tobacco cessation counseling was provided  The patient is sincerely urged to quit consumption of tobacco  He is not ready to quit tobacco  Medication options and side effects of medication discussed  Patient refused medication  Preventive health issues were discussed with patient, and age appropriate screening tests were ordered as noted in patient's After Visit Summary  Personalized health advice and appropriate referrals for health education or preventive services given if needed, as noted in patient's After Visit Summary  History of Present Illness:     Patient presents for a Medicare Wellness Visit    Patient here for review of chronic medical problems and review of the labs and imaging if it is applicable  Currently has no specific complaints other than mentioned in the review of systems  Denies chest pain, SOB, cough, abdominal pain, nausea, vomiting, fever, chills, lightheadedness, dizziness,headache, tingling or numbness  No bowel or bladder problem  Patient Care Team:  Belinda Dozier MD as PCP - General (Internal Medicine)     Review of Systems:     Review of Systems   Constitutional: Negative for chills, fatigue and fever  HENT: Negative for congestion, ear pain, rhinorrhea, sneezing and sore throat  Eyes: Negative for redness, itching and visual disturbance  Respiratory: Negative for cough, chest tightness and shortness of breath  Cardiovascular: Negative for chest pain, palpitations and leg swelling  Gastrointestinal: Negative for abdominal pain, blood in stool, diarrhea, nausea and vomiting  Endocrine: Negative for cold intolerance and heat intolerance  Genitourinary: Negative for dysuria, frequency and urgency  Musculoskeletal: Negative for arthralgias, back pain and myalgias  Skin: Negative for color change and rash  Neurological: Negative for dizziness, weakness, light-headedness, numbness and headaches  Hematological: Does not bruise/bleed easily  Psychiatric/Behavioral: Negative for agitation, behavioral problems and confusion          Problem List:     Patient Active Problem List   Diagnosis   • Type 2 diabetes mellitus without complication, without long-term current use of insulin ([facility])   • HTN (hypertension), benign   • Calculus, kidney   • HL (hearing loss)   • Colon polyp   • Bladder polyps   • Atherosclerosis of coronary artery without angina pectoris   • Hypogonadism   • Mixed hyperlipidemia   • Peripheral arterial occlusive disease ([facility] [address] )   • Spinal stenosis of lumbar region   • Tobacco dependence syndrome   • History of myocardial infarction   • Testosterone deficiency   • Type 2 diabetes mellitus with diabetic peripheral angiopathy without gangrene, without long-term current use of insulin ([facility])      Past Medical and Surgical History:     Past Medical History:   Diagnosis Date   • Bladder polyps     history of   • Colon polyp    • Coronary artery disease    • Diabetes mellitus (H[facility])    • DJD (degenerative joint disease)    • Ear problems    • HL (hearing loss)    • Hyperlipidemia    • Hypertension    • Kidney stone    • Myocardial infarction [facility])    • Obesity    • Osteoporosis    • Tinnitus      Past Surgical History:   Procedure Laterality Date   • BLADDER SURGERY     • CARDIAC CATHETERIZATION     • CATARACT EXTRACTION  11/15/2020   • CATARACT EXTRACTION  12/03/2020   • CHOLECYSTECTOMY     • COLONOSCOPY     • CORONARY ANGIOPLASTY WITH STENT PLACEMENT      x3 last one 2019   • FL RETROGRADE PYELOGRAM  04/20/2021   • MYRINGOTOMY W/ TUBES     • GA CYSTO/URETERO W/LITHOTRIPSY &INDWELL STENT INSRT Right 04/20/2021    Procedure: CYSTOSCOPY RIGHT  URETEROSCOPY WITH LITHOTRIPSY HOLMIUM LASER, AGNIESZKA  RETROGRADE PYELOGRAM AND INSERTION  RIGHT STENT URETERAL;  Surgeon: Swathi Patton MD;  Location: [facility];  Service: Urology   • TONSILLECTOMY     • WISDOM TOOTH EXTRACTION        Family History:     Family History   Problem Relation Age of Onset   • Heart disease Mother    • Cancer Father       Social History:     Social History     Socioeconomic History   • Marital status:      Spouse name: None   • Number of children: None   • Years of education: None   • Highest education level: None   Occupational History   • None   Tobacco Use   • Smoking status: Every Day     Packs/day: 0 50     Years: 50 00     Pack years: 25 00     Types: Cigarettes     Passive exposure: Current   • Smokeless tobacco: Never   Vaping Use   • Vaping Use: Every day   • Substances: Nicotine   Substance and Sexual Activity   • Alcohol use: Not Currently   • Drug use: Never   • Sexual activity: None   Other Topics Concern   • None   Social History Narrative   • None     Social Determinants of Health     Financial Resource Strain: Low Risk    • Difficulty of Paying Living Expenses: Not very hard   Food Insecurity: Not on file   Transportation Needs: No Transportation Needs   • Lack of Transportation (Medical): No   • Lack of Transportation (Non-Medical):  No   Physical Activity: Not on file   Stress: Not on file   Social Connections: Not on file   Intimate Partner Violence: Not on file   Housing Stability: Not on file      Medications and Allergies:     Current Outpatient Medications   Medication Sig Dispense Refill   • ASPIRIN PO Take 81 mg by mouth in the morning     • clopidogrel (PLAVIX) 75 mg tablet TAKE ONE TABLET BY MOUTH EVERY DAY 90 tablet 0   • diazepam (Valium) 5 mg tablet Take 1 tablet (5 mg total) by mouth every 6 (six) hours as needed for anxiety 30 tablet 0   • Dulaglutide 1 5 MG/0 5ML SOPN once a week     • finasteride (PROSCAR) 5 mg tablet Daily     • furosemide (LASIX) 20 mg tablet TAKE ONE TABLET BY MOUTH ONCE DAILY 90 tablet 0   • glipiZIDE (GLUCOTROL XL) 5 mg 24 hr tablet TAKE ONE TABLET BY MOUTH EVERY DAY 90 tablet 0   • GLUCOSAMINE-CHONDROITIN PO Take by mouth in the morning     • losartan (COZAAR) 50 mg tablet TAKE ONE TABLET BY MOUTH EVERY DAY 90 tablet 0   • metFORMIN (GLUCOPHAGE) 1000 MG tablet TAKE ONE TABLET BY MOUTH TWICE A DAY WITH MEALS 180 tablet 0   • metoprolol succinate (TOPROL-XL) 50 mg 24 hr tablet TAKE ONE TABLET BY MOUTH EVERY DAY 90 tablet 0   • potassium chloride (K-DUR,KLOR-CON) 20 mEq tablet TAKE ONE TABLET BY MOUTH EVERY DAY 90 tablet 0   • rosuvastatin (CRESTOR) 10 MG tablet TAKE ONE TABLET BY MOUTH EVERY DAY AT BEDTIME 90 tablet 0   • tamsulosin (FLOMAX) 0 4 mg TAKE ONE CAPSULE BY MOUTH EVERY DAY 90 capsule 0   • testosterone cypionate (DEPO-TESTOSTERONE) 200 mg/mL SOLN INJECT 0 25ML INTRAMUSCULARLY ONCE WEEKLY AS DIRECTED 3 mL 0     No current facility-administered medications for this visit  Allergies   Allergen Reactions   • Penicillins Rash     none        Immunizations:     Immunization History   Administered Date(s) Administered   • COVID-19 MODERNA VACC 0 5 ML IM 01/14/2021, 02/11/2021, 11/12/2021, 05/13/2022      Health Maintenance:         Topic Date Due   • Lung Cancer Screening  08/03/2023 (Originally 2/5/1990)         Topic Date Due   • Hepatitis A Vaccine (1 of 2 - Risk 2-dose series) Never done   • Hepatitis B Vaccine (1 of 3 - Risk 3-dose series) Never done   • COVID-19 Vaccine (5 - Booster for [place] series) 07/08/2022      Medicare Screening Tests and Risk Assessments:     Marlene Khanna is here for his Subsequent Wellness visit  Last Medicare Wellness visit information reviewed, patient interviewed and updates made to the record today        Health Risk Assessment: Patient rates overall health as good  Patient feels that their physical health rating is slightly worse  Patient is satisfied with their life  Eyesight was rated as same  Hearing was rated as same  Patient feels that their emotional and mental health rating is same  Patients states they are never, rarely angry  Patient states they are never, rarely unusually tired/fatigued  Pain experienced in the last 7 days has been some  Patient's pain rating has been 3/10  Patient states that he has experienced no weight loss or gain in last 6 months  Depression Screening:   PHQ-2 Score: 1      Fall Risk Screening: In the past year, patient has experienced: no history of falling in past year      Home Safety:  Patient does not have trouble with stairs inside or outside of their home  Patient has working smoke alarms and has no working carbon monoxide detector  Home safety hazards include: none  Nutrition:   Current diet is Regular and Limited junk food  Medications:   Patient is currently taking over-the-counter supplements  OTC medications include: see medication list  Patient is able to manage medications  Activities of Daily Living (ADLs)/Instrumental Activities of Daily Living (IADLs):   Walk and transfer into and out of bed and chair?: Yes  Dress and groom yourself?: Yes    Bathe or shower yourself?: Yes    Feed yourself? Yes  Do your laundry/housekeeping?: Yes  Manage your money, pay your bills and track your expenses?: Yes  Make your own meals?: Yes    Do your own shopping?: Yes    Previous Hospitalizations:   Any hospitalizations or ED visits within the last 12 months?: No      Advance Care Planning:   Living will: No    Durable POA for healthcare:  Yes    Advanced directive: No    Advanced directive counseling given: No    Five wishes given: Yes    Patient declined ACP directive: No    End of Life Decisions reviewed with patient: No      Cognitive Screening:   Provider or family/friend/caregiver concerned regarding cognition?: No    PREVENTIVE SCREENINGS      Cardiovascular Screening:    General: Screening Not Indicated and History Lipid Disorder      Diabetes Screening:     General: Screening Not Indicated and History Diabetes      Colorectal Cancer Screening:     General: Screening Current      Prostate Cancer Screening:    General: Screening Not Indicated and Screening Current      Abdominal Aortic Aneurysm (AAA) Screening:    Risk factors include: tobacco use        Lung Cancer Screening:     General: Screening Not Indicated    Screening, Brief Intervention, and Referral to Treatment (SBIRT)    Screening    Typical number of drinks in a week: 0    AUDIT-C Screenin) How often did you have a drink containing alcohol in the past year? monthly or less  2) How many drinks did you have on a typical day when you were drinking in the past year? 1 to 2  3) How often did you have 6 or more drinks on one occasion in the past year? never    AUDIT-C Score: 1  Interpretation: Score 0-3 (male): Negative screen for alcohol misuse    Single Item Drug Screening:  How often have you used an illegal drug (including marijuana) or a prescription medication for non-medical reasons in the past year? never    Single Item Drug Screen Score: 0  Interpretation: Negative screen for possible drug use disorder    No results found  Physical Exam:     /64 (BP Location: Left arm, Patient Position: Sitting, Cuff Size: Standard)   Pulse 92   Temp 97 7 °F (36 5 °C) (Tympanic)   Resp 18   Ht 5' 9" (1 753 m)   Wt 93 7 kg (206 lb 9 6 oz)   SpO2 99%   BMI 30 51 kg/m²     Physical Exam  Vitals and nursing note reviewed  Constitutional:       General: He is not in acute distress  Appearance: Normal appearance  He is well-developed and normal weight  He is not ill-appearing, toxic-appearing or diaphoretic  HENT:      Head: Normocephalic and atraumatic        Right Ear: Tympanic membrane, ear canal and external ear normal  There is no impacted cerumen  Left Ear: Tympanic membrane, ear canal and external ear normal  There is no impacted cerumen  Nose: Nose normal  No congestion or rhinorrhea  Mouth/Throat:      Mouth: Mucous membranes are moist       Pharynx: Oropharynx is clear  Eyes:      General: No scleral icterus  Right eye: No discharge  Left eye: No discharge  Extraocular Movements: Extraocular movements intact  Conjunctiva/sclera: Conjunctivae normal       Pupils: Pupils are equal, round, and reactive to light  Cardiovascular:      Rate and Rhythm: Normal rate and regular rhythm  Pulses: Normal pulses  Heart sounds: Normal heart sounds  No murmur heard  Pulmonary:      Effort: Pulmonary effort is normal  No respiratory distress  Breath sounds: Normal breath sounds  No wheezing  Abdominal:      General: Abdomen is flat  Bowel sounds are normal  There is no distension  Palpations: Abdomen is soft  Tenderness: There is no abdominal tenderness  Musculoskeletal:         General: Normal range of motion  Cervical back: Normal range of motion and neck supple  Skin:     General: Skin is warm and dry  Capillary Refill: Capillary refill takes 2 to 3 seconds  Coloration: Skin is not jaundiced  Neurological:      General: No focal deficit present  Mental Status: He is alert and oriented to person, place, and time  Mental status is at baseline     Psychiatric:         Mood and Affect: Mood normal           Kranthi Raymond MD

## 2022-12-30 NOTE — ASSESSMENT & PLAN NOTE
Lab Results   Component Value Date    HGBA1C 6 6 (H) 11/04/2022   Under control  Continue current medication  We will re-evaluate at next office visit

## 2022-12-30 NOTE — PATIENT INSTRUCTIONS
Medicare Preventive Visit Patient Instructions  Thank you for completing your Welcome to Medicare Visit or Medicare Annual Wellness Visit today  Your next wellness visit will be due in one year (12/31/2023)  The screening/preventive services that you may require over the next 5-10 years are detailed below  Some tests may not apply to you based off risk factors and/or age  Screening tests ordered at today's visit but not completed yet may show as past due  Also, please note that scanned in results may not display below  Preventive Screenings:  Service Recommendations Previous Testing/Comments   Colorectal Cancer Screening  · Colonoscopy    · Fecal Occult Blood Test (FOBT)/Fecal Immunochemical Test (FIT)  · Fecal DNA/Cologuard Test  · Flexible Sigmoidoscopy Age: 39-70 years old   Colonoscopy: every 10 years (May be performed more frequently if at higher risk)  OR  FOBT/FIT: every 1 year  OR  Cologuard: every 3 years  OR  Sigmoidoscopy: every 5 years  Screening may be recommended earlier than age 39 if at higher risk for colorectal cancer  Also, an individualized decision between you and your healthcare provider will decide whether screening between the ages of 74-80 would be appropriate   Colonoscopy: 01/18/2022  FOBT/FIT: Not on file  Cologuard: Not on file  Sigmoidoscopy: Not on file          Prostate Cancer Screening Individualized decision between patient and health care provider in men between ages of 53-78   Medicare will cover every 12 months beginning on the day after your 50th birthday PSA: No results in last 5 years           Hepatitis C Screening Once for adults born between [birth year] and 1965  More frequently in patients at high risk for Hepatitis C Hep C Antibody: Not on file        Diabetes Screening 1-2 times per year if you're at risk for diabetes or have pre-diabetes Fasting glucose: No results in last 5 years (No results in last 5 years)  A1C: 6 6 % (11/4/2022)      Cholesterol Screening Once every 5 years if you don't have a lipid disorder  May order more often based on risk factors  Lipid panel: 11/04/2022         Other Preventive Screenings Covered by Medicare:  1  Abdominal Aortic Aneurysm (AAA) Screening: covered once if your at risk  You're considered to be at risk if you have a family history of AAA or a male between the age of 73-68 who smoking at least 100 cigarettes in your lifetime  2  Lung Cancer Screening: covers low dose CT scan once per year if you meet all of the following conditions: (1) Age 50-69; (2) No signs or symptoms of lung cancer; (3) Current smoker or have quit smoking within the last 15 years; (4) You have a tobacco smoking history of at least 20 pack years (packs per day x number of years you smoked); (5) You get a written order from a healthcare provider  3  Glaucoma Screening: covered annually if you're considered high risk: (1) You have diabetes OR (2) Family history of glaucoma OR (3)  aged 48 and older OR (3)  American aged 72 and older  3  Osteoporosis Screening: covered every 2 years if you meet one of the following conditions: (1) Have a vertebral abnormality; (2) On glucocorticoid therapy for more than 3 months; (3) Have primary hyperparathyroidism; (4) On osteoporosis medications and need to assess response to drug therapy  5  HIV Screening: covered annually if you're between the age of 12-76  Also covered annually if you are younger than 13 and older than 72 with risk factors for HIV infection  For pregnant patients, it is covered up to 3 times per pregnancy      Immunizations:  Immunization Recommendations   Influenza Vaccine Annual influenza vaccination during flu season is recommended for all persons aged >= 6 months who do not have contraindications   Pneumococcal Vaccine   * Pneumococcal conjugate vaccine = PCV13 (Prevnar 13), PCV15 (Vaxneuvance), PCV20 (Prevnar 20)  * Pneumococcal polysaccharide vaccine = PPSV23 (Pneumovax) Adults 19-64 years old: 1-3 doses may be recommended based on certain risk factors  Adults 72 years old: 1-2 doses may be recommended based off what pneumonia vaccine you previously received   Hepatitis B Vaccine 3 dose series if at intermediate or high risk (ex: diabetes, end stage renal disease, liver disease)   Tetanus (Td) Vaccine - COST NOT COVERED BY MEDICARE PART B Following completion of primary series, a booster dose should be given every 10 years to maintain immunity against tetanus  Td may also be given as tetanus wound prophylaxis  Tdap Vaccine - COST NOT COVERED BY MEDICARE PART B Recommended at least once for all adults  For pregnant patients, recommended with each pregnancy  Shingles Vaccine (Shingrix) - COST NOT COVERED BY MEDICARE PART B  2 shot series recommended in those aged 48 and above     Health Maintenance Due:      Topic Date Due   • Lung Cancer Screening  08/03/2023 (Originally 2/5/1990)     Immunizations Due:      Topic Date Due   • Hepatitis A Vaccine (1 of 2 - Risk 2-dose series) Never done   • Hepatitis B Vaccine (1 of 3 - Risk 3-dose series) Never done   • COVID-19 Vaccine (5 - Booster for Moderna series) 07/08/2022     Advance Directives   What are advance directives? Advance directives are legal documents that state your wishes and plans for medical care  These plans are made ahead of time in case you lose your ability to make decisions for yourself  Advance directives can apply to any medical decision, such as the treatments you want, and if you want to donate organs  What are the types of advance directives? There are many types of advance directives, and each state has rules about how to use them  You may choose a combination of any of the following:  · Living will: This is a written record of the treatment you want  You can also choose which treatments you do not want, which to limit, and which to stop at a certain time  This includes surgery, medicine, IV fluid, and tube feedings  · Durable power of  for healthcare [facility]): This is a written record that states who you want to make healthcare choices for you when you are unable to make them for yourself  This person, called a proxy, is usually a family member or a friend  You may choose more than 1 proxy  · Do not resuscitate (DNR) order:  A DNR order is used in case your heart stops beating or you stop breathing  It is a request not to have certain forms of treatment, such as CPR  A DNR order may be included in other types of advance directives  · Medical directive: This covers the care that you want if you are in a coma, near death, or unable to make decisions for yourself  You can list the treatments you want for each condition  Treatment may include pain medicine, surgery, blood transfusions, dialysis, IV or tube feedings, and a ventilator (breathing machine)  · Values history: This document has questions about your views, beliefs, and how you feel and think about life  This information can help others choose the care that you would choose  Why are advance directives important? An advance directive helps you control your care  Although spoken wishes may be used, it is better to have your wishes written down  Spoken wishes can be misunderstood, or not followed  Treatments may be given even if you do not want them  An advance directive may make it easier for your family to make difficult choices about your care  Cigarette Smoking and Your Health   Risks to your health if you smoke:  Nicotine and other chemicals found in tobacco damage every cell in your body  Even if you are a light smoker, you have an increased risk for cancer, heart disease, and lung disease  If you are pregnant or have diabetes, smoking increases your risk for complications  Benefits to your health if you stop smoking:   · You decrease respiratory symptoms such as coughing, wheezing, and shortness of breath     · You reduce your risk for cancers of the lung, mouth, throat, kidney, bladder, pancreas, stomach, and cervix  If you already have cancer, you increase the benefits of chemotherapy  You also reduce your risk for cancer returning or a second cancer from developing  · You reduce your risk for heart disease, blood clots, heart attack, and stroke  · You reduce your risk for lung infections, and diseases such as pneumonia, asthma, chronic bronchitis, and emphysema  · Your circulation improves  More oxygen can be delivered to your body  If you have diabetes, you lower your risk for complications, such as kidney, artery, and eye diseases  You also lower your risk for nerve damage  Nerve damage can lead to amputations, poor vision, and blindness  · You improve your body's ability to heal and to fight infections  For more information and support to stop smoking:   · [employer]  Phone: 0- 599 - 023-5210  Web Address: [employer]  Weight Management   Why it is important to manage your weight:  Being overweight increases your risk of health conditions such as heart disease, high blood pressure, type 2 diabetes, and certain types of cancer  It can also increase your risk for osteoarthritis, sleep apnea, and other respiratory problems  Aim for a slow, steady weight loss  Even a small amount of weight loss can lower your risk of health problems  How to lose weight safely:  A safe and healthy way to lose weight is to eat fewer calories and get regular exercise  You can lose up about 1 pound a week by decreasing the number of calories you eat by 500 calories each day  Healthy meal plan for weight management:  A healthy meal plan includes a variety of foods, contains fewer calories, and helps you stay healthy  A healthy meal plan includes the following:  · Eat whole-grain foods more often  A healthy meal plan should contain fiber  Fiber is the part of grains, fruits, and vegetables that is not broken down by your body   Whole-grain foods are healthy and provide extra fiber in your diet  Some examples of whole-grain foods are whole-wheat breads and pastas, oatmeal, brown rice, and bulgur  · Eat a variety of vegetables every day  Include dark, leafy greens such as spinach, kale, susanne greens, and mustard greens  Eat yellow and orange vegetables such as carrots, sweet potatoes, and winter squash  · Eat a variety of fruits every day  Choose fresh or canned fruit (canned in its own juice or light syrup) instead of juice  Fruit juice has very little or no fiber  · Eat low-fat dairy foods  Drink fat-free (skim) milk or 1% milk  Eat fat-free yogurt and low-fat cottage cheese  Try low-fat cheeses such as mozzarella and other reduced-fat cheeses  · Choose meat and other protein foods that are low in fat  Choose beans or other legumes such as split peas or lentils  Choose fish, skinless poultry (chicken or turkey), or lean cuts of red meat (beef or pork)  Before you cook meat or poultry, cut off any visible fat  · Use less fat and oil  Try baking foods instead of frying them  Add less fat, such as margarine, sour cream, regular salad dressing and mayonnaise to foods  Eat fewer high-fat foods  Some examples of high-fat foods include french fries, doughnuts, ice cream, and cakes  · Eat fewer sweets  Limit foods and drinks that are high in sugar  This includes candy, cookies, regular soda, and sweetened drinks  Exercise:  Exercise at least 30 minutes per day on most days of the week  Some examples of exercise include walking, biking, dancing, and swimming  You can also fit in more physical activity by taking the stairs instead of the elevator or parking farther away from stores  Ask your healthcare provider about the best exercise plan for you  © Copyright [employer] 2018 Information is for End User's use only and may not be sold, redistributed or otherwise used for commercial purposes   All illustrations and images included in CareNotes® are the copyrighted property of A D A M , Inc  or [address] [place], [place] [address]

## 2023-01-04 ENCOUNTER — TELEPHONE (OUTPATIENT)
Dept: ADMINISTRATIVE | Facility: OTHER | Age: 83
End: 2023-01-04

## 2023-01-04 NOTE — LETTER
Diabetic Eye Exam Form    Date Requested: 23  Patient: Amy Marsh  Patient : 1940   Referring Provider: Mariely Rhodes MD      DIABETIC Eye Exam Date _______________________________      Type of Exam MUST be documented for Diabetic Eye Exams  Please CHECK ONE  Retinal Exam       Dilated Retinal Exam       OCT       Optomap-Iris Exam      Fundus Photography       Left Eye - Please check Retinopathy or No Retinopathy        Exam did show retinopathy    Exam did not show retinopathy       Right Eye - Please check Retinopathy or No Retinopathy       Exam did show retinopathy    Exam did not show retinopathy       Comments __________________________________________________________    Practice Providing Exam ______________________________________________    Exam Performed By (print name) _______________________________________      Provider Signature ___________________________________________________      These reports are needed for  compliance  Please fax this completed form and a copy of the Diabetic Eye Exam report to our office located at [place] [address] as soon as possible via [phone] ashwini Aburto: Phone [phone]  We thank you for your assistance in treating our mutual patient

## 2023-01-04 NOTE — TELEPHONE ENCOUNTER
Upon review of the In Basket request and the patient's chart, initial outreach has been made via fax to facility  Please see Contacts section for details       Thank you  Flori Avila MA

## 2023-01-04 NOTE — TELEPHONE ENCOUNTER
----- Message from Vandana Garcias sent at 12/30/2022 12:19 PM EST -----  Please locate last diabetic eye exam report from Dr Lyly Torres    Thank you!!

## 2023-01-06 NOTE — TELEPHONE ENCOUNTER
Upon review of the In Basket request we were able to locate, review, and update the patient chart as requested for Diabetic Eye Exam     Any additional questions or concerns should be emailed to the Practice Liaisons via the appropriate education email address, please do not reply via In Basket      Thank you  Jacinto Jones MA

## 2023-01-11 ENCOUNTER — RA CDI HCC (OUTPATIENT)
Dept: OTHER | Facility: HOSPITAL | Age: 83
End: 2023-01-11

## 2023-01-11 NOTE — PROGRESS NOTES
[id number]  [facility]  coding opportunities          Chart Reviewed number of suggestions sent to Provider: 1     Patients Insurance     Medicare Insurance: [employer]

## 2023-01-18 ENCOUNTER — CONSULT (OUTPATIENT)
Dept: FAMILY MEDICINE CLINIC | Facility: CLINIC | Age: 83
End: 2023-01-18

## 2023-01-18 VITALS
DIASTOLIC BLOOD PRESSURE: 72 MMHG | RESPIRATION RATE: 18 BRPM | WEIGHT: 206 LBS | HEIGHT: 69 IN | BODY MASS INDEX: 30.51 KG/M2 | TEMPERATURE: 97.4 F | OXYGEN SATURATION: 98 % | SYSTOLIC BLOOD PRESSURE: 146 MMHG | HEART RATE: 91 BPM

## 2023-01-18 DIAGNOSIS — E78.2 MIXED HYPERLIPIDEMIA: ICD-10-CM

## 2023-01-18 DIAGNOSIS — E11.51 TYPE 2 DIABETES MELLITUS WITH DIABETIC PERIPHERAL ANGIOPATHY WITHOUT GANGRENE, WITHOUT LONG-TERM CURRENT USE OF INSULIN (HCC): ICD-10-CM

## 2023-01-18 DIAGNOSIS — I10 HTN (HYPERTENSION), BENIGN: ICD-10-CM

## 2023-01-18 DIAGNOSIS — I25.10 ATHEROSCLEROSIS OF NATIVE CORONARY ARTERY OF NATIVE HEART WITHOUT ANGINA PECTORIS: ICD-10-CM

## 2023-01-18 DIAGNOSIS — F17.200 TOBACCO DEPENDENCE SYNDROME: ICD-10-CM

## 2023-01-18 DIAGNOSIS — I77.9 PERIPHERAL ARTERIAL OCCLUSIVE DISEASE (HCC): ICD-10-CM

## 2023-01-18 DIAGNOSIS — Z12.5 SCREENING FOR PROSTATE CANCER: ICD-10-CM

## 2023-01-18 DIAGNOSIS — Z01.818 PRE-OPERATIVE CLEARANCE: Primary | ICD-10-CM

## 2023-01-18 PROBLEM — E11.9 TYPE 2 DIABETES MELLITUS WITHOUT COMPLICATION, WITHOUT LONG-TERM CURRENT USE OF INSULIN (HCC): Status: RESOLVED | Noted: 2021-04-20 | Resolved: 2023-01-18

## 2023-01-18 RX ORDER — OXYBUTYNIN CHLORIDE 5 MG/1
5 TABLET, EXTENDED RELEASE ORAL DAILY
COMMUNITY
Start: 2023-01-11 | End: 2024-01-11

## 2023-01-18 NOTE — PROGRESS NOTES
Assessment/Plan:         Problem List Items Addressed This Visit        Endocrine    Type 2 diabetes mellitus with diabetic peripheral angiopathy without gangrene, without long-term current use of insulin ([facility] )       Lab Results   Component Value Date    HGBA1C 6 6 (H) 11/04/2022   Under control  Continue current medication  We will re-evaluate at next office visit  Cardiovascular and Mediastinum    HTN (hypertension), benign     Under control  Continue current medication  We will re-evaluate at next office visit  Atherosclerosis of coronary artery without angina pectoris     Under control  Continue current medication  We will re-evaluate at next office visit  Peripheral arterial occlusive disease ([facility] [address] )     Under control  Continue current medication  We will re-evaluate at next office visit  Other    Mixed hyperlipidemia     Under control  Continue current medication  We will re-evaluate at next office visit  Tobacco dependence syndrome     Quit smoking  Options for nicotine patch, nicotine gum or any medication discussed with patient  Potential consequences of smoking discussed with patient  Patient seems to be understood well           Pre-operative clearance - Primary     Higher than average risk for surgery  No contraindication for surgery as long as cleared by cardiology  Continue all medication postoperatively  Call for any perioperative problems          Other Visit Diagnoses     Screening for prostate cancer        Relevant Orders    PSA, Total Screen            Subjective:      Patient ID: Sydney Gee is a [de-identified] y o  male  Patient here for preoperative evaluation as he going for removal of the polyp from his urinary bladder by Dr Jose F Rod and  review of chronic medical problems and review of the labs and imaging if it is applicable    Currently has no specific complaints other than mentioned in the review of systems  Denies chest pain, SOB, cough, abdominal pain, nausea, vomiting, fever, chills, lightheadedness, dizziness,headache, tingling or numbness  No bowel or bladder problem  The following portions of the patient's history were reviewed and updated as appropriate:   Past Medical History:  He has a past medical history of Bladder polyps, Colon polyp, Coronary artery disease, Diabetes mellitus ([facility] [address] ), DJD (degenerative joint disease), Ear problems, HL (hearing loss), Hyperlipidemia, Hypertension, Kidney stone, My[facility] ), Obesity, Osteoporosis, and Tinnitus  ,  _______________________________________________________________________  Medical Problems:  does not have any pertinent problems on file ,  _______________________________________________________________________  Past Surgical History:   has a past surgical history that includes Myringotomy w/ tubes; Tonsillectomy; Cholecystectomy; Cataract extraction (11/15/2020); Colonoscopy; Coronary angioplasty with stent; pr cysto/uretero w/lithotripsy &indwell stent insrt (Right, 04/20/2021); [place] retrograde pyelogram (04/20/2021); [place] tooth extraction; Bladder surgery; Cardiac catheterization; and Cataract extraction (12/03/2020)  ,  _______________________________________________________________________  Family History:  family history includes Cancer in his father; Heart disease in his mother ,  _______________________________________________________________________  Social History:   reports that he has been smoking cigarettes  He has a 25 00 pack-year smoking history  He has been exposed to tobacco smoke  He has never used smokeless tobacco  He reports that he does not currently use alcohol  He reports that he does not use drugs  ,  _______________________________________________________________________  Allergies:  is allergic to penicillins     _______________________________________________________________________  Current Outpatient Medications   Medication Sig Dispense Refill   • ASPIRIN PO Take 81 mg by mouth in the morning     • clopidogrel (PLAVIX) 75 mg tablet TAKE ONE TABLET BY MOUTH EVERY DAY 90 tablet 0   • diazepam (Valium) 5 mg tablet Take 1 tablet (5 mg total) by mouth every 6 (six) hours as needed for anxiety 30 tablet 0   • Dulaglutide 1 5 MG/0 5ML SOPN once a week     • finasteride (PROSCAR) 5 mg tablet Daily     • furosemide (LASIX) 20 mg tablet TAKE ONE TABLET BY MOUTH ONCE DAILY 90 tablet 0   • glipiZIDE (GLUCOTROL XL) 5 mg 24 hr tablet TAKE ONE TABLET BY MOUTH EVERY DAY 90 tablet 0   • GLUCOSAMINE-CHONDROITIN PO Take by mouth in the morning     • losartan (COZAAR) 50 mg tablet TAKE ONE TABLET BY MOUTH EVERY DAY 90 tablet 0   • metFORMIN (GLUCOPHAGE) 1000 MG tablet TAKE ONE TABLET BY MOUTH TWICE A DAY WITH MEALS 180 tablet 0   • metoprolol succinate (TOPROL-XL) 50 mg 24 hr tablet TAKE ONE TABLET BY MOUTH EVERY DAY 90 tablet 0   • oxybutynin (DITROPAN-XL) 5 mg 24 hr tablet Take 5 mg by mouth daily     • potassium chloride (K-DUR,KLOR-CON) 20 mEq tablet TAKE ONE TABLET BY MOUTH EVERY DAY 90 tablet 0   • rosuvastatin (CRESTOR) 10 MG tablet TAKE ONE TABLET BY MOUTH EVERY DAY AT BEDTIME 90 tablet 0   • tamsulosin (FLOMAX) 0 4 mg TAKE ONE CAPSULE BY MOUTH EVERY DAY 90 capsule 0   • testosterone cypionate (DEPO-TESTOSTERONE) 200 mg/mL SOLN INJECT 0 25ML INTRAMUSCULARLY ONCE WEEKLY AS DIRECTED 3 mL 0     No current facility-administered medications for this visit      _______________________________________________________________________  Review of Systems   Constitutional: Negative for chills, fatigue and fever  HENT: Negative for congestion, ear pain, rhinorrhea, sneezing and sore throat  Eyes: Negative for redness, itching and visual disturbance  Respiratory: Negative for cough, chest tightness and shortness of breath  Cardiovascular: Negative for chest pain, palpitations and leg swelling     Gastrointestinal: Negative for abdominal pain, blood in stool, diarrhea, nausea and vomiting  Endocrine: Negative for cold intolerance and heat intolerance  Genitourinary: Negative for dysuria, frequency and urgency  Musculoskeletal: Negative for arthralgias, back pain and myalgias  Skin: Negative for color change and rash  Neurological: Negative for dizziness, weakness, light-headedness, numbness and headaches  Hematological: Does not bruise/bleed easily  Psychiatric/Behavioral: Negative for agitation, behavioral problems and confusion  Objective:  Vitals:    01/18/23 1040   BP: 146/72   BP Location: Left arm   Patient Position: Sitting   Cuff Size: Standard   Pulse: 91   Resp: 18   Temp: (!) 97 4 °F (36 3 °C)   TempSrc: Tympanic   SpO2: 98%   Weight: 93 4 kg (206 lb)   Height: 5' 9" (1 753 m)     Body mass index is 30 42 kg/m²  Physical Exam  Vitals and nursing note reviewed  Constitutional:       General: He is not in acute distress  Appearance: Normal appearance  He is not ill-appearing, toxic-appearing or diaphoretic  HENT:      Head: Normocephalic and atraumatic  Right Ear: Tympanic membrane normal       Left Ear: Tympanic membrane normal       Nose: Nose normal  No congestion  Mouth/Throat:      Mouth: Mucous membranes are moist    Eyes:      General: No scleral icterus  Right eye: No discharge  Left eye: No discharge  Extraocular Movements: Extraocular movements intact  Conjunctiva/sclera: Conjunctivae normal       Pupils: Pupils are equal, round, and reactive to light  Cardiovascular:      Rate and Rhythm: Normal rate and regular rhythm  Pulses: Normal pulses  Heart sounds: Normal heart sounds  No murmur heard  No gallop  Pulmonary:      Effort: Pulmonary effort is normal  No respiratory distress  Breath sounds: Normal breath sounds  No wheezing, rhonchi or rales  Abdominal:      General: Abdomen is flat   Bowel sounds are normal  There is no distension  Palpations: Abdomen is soft  Tenderness: There is no abdominal tenderness  There is no guarding  Musculoskeletal:         General: No swelling or tenderness  Normal range of motion  Cervical back: Normal range of motion and neck supple  No rigidity  Right lower leg: No edema  Left lower leg: No edema  Lymphadenopathy:      Cervical: No cervical adenopathy  Skin:     General: Skin is warm  Capillary Refill: Capillary refill takes 2 to 3 seconds  Coloration: Skin is not jaundiced  Findings: No bruising or rash  Neurological:      General: No focal deficit present  Mental Status: He is alert and oriented to person, place, and time  Mental status is at baseline        Gait: Gait normal    Psychiatric:         Mood and Affect: Mood normal

## 2023-01-18 NOTE — ASSESSMENT & PLAN NOTE
Higher than average risk for surgery  No contraindication for surgery as long as cleared by cardiology  Continue all medication postoperatively    Call for any perioperative problems

## 2023-01-22 DIAGNOSIS — E11.51 TYPE 2 DIABETES MELLITUS WITH DIABETIC PERIPHERAL ANGIOPATHY WITHOUT GANGRENE, WITHOUT LONG-TERM CURRENT USE OF INSULIN (HCC): ICD-10-CM

## 2023-01-23 RX ORDER — GLIPIZIDE 5 MG/1
TABLET, FILM COATED, EXTENDED RELEASE ORAL
Qty: 90 TABLET | Refills: 0 | Status: SHIPPED | OUTPATIENT
Start: 2023-01-23

## 2023-01-24 ENCOUNTER — TELEPHONE (OUTPATIENT)
Dept: FAMILY MEDICINE CLINIC | Facility: CLINIC | Age: 83
End: 2023-01-24

## 2023-01-24 NOTE — TELEPHONE ENCOUNTER
Do not take any diabetic medicine on the morning of surgery    Only medicine you should take would be for blood pressure with sip of water

## 2023-01-24 NOTE — TELEPHONE ENCOUNTER
Nixon rojas  It's page Rausch Dear  Listen, the hospital has asked me to check with Doctor Mandi Crimes  I'm having a procedure on February 2nd and they want me to ask him what I should do about the morning of the surgery with my diabetic medications  So if you would please, when you get a chance, ask him and call me back  And if I'm not here, just leave a message  No big deal  OK  Thanks   Bye

## 2023-02-26 DIAGNOSIS — N40.0 BENIGN PROSTATIC HYPERPLASIA WITHOUT LOWER URINARY TRACT SYMPTOMS: ICD-10-CM

## 2023-02-27 RX ORDER — TAMSULOSIN HYDROCHLORIDE 0.4 MG/1
CAPSULE ORAL
Qty: 90 CAPSULE | Refills: 0 | Status: SHIPPED | OUTPATIENT
Start: 2023-02-27

## 2023-03-05 DIAGNOSIS — E78.2 MIXED HYPERLIPIDEMIA: ICD-10-CM

## 2023-03-05 DIAGNOSIS — I10 BENIGN ESSENTIAL HYPERTENSION: ICD-10-CM

## 2023-03-06 RX ORDER — LOSARTAN POTASSIUM 50 MG/1
TABLET ORAL
Qty: 90 TABLET | Refills: 0 | Status: SHIPPED | OUTPATIENT
Start: 2023-03-06

## 2023-03-06 RX ORDER — ROSUVASTATIN CALCIUM 10 MG/1
TABLET, COATED ORAL
Qty: 90 TABLET | Refills: 0 | Status: SHIPPED | OUTPATIENT
Start: 2023-03-06

## 2023-03-12 DIAGNOSIS — E34.9 TESTOSTERONE DEFICIENCY: ICD-10-CM

## 2023-03-13 RX ORDER — TESTOSTERONE CYPIONATE 200 MG/ML
INJECTION, SOLUTION INTRAMUSCULAR
Qty: 3 ML | Refills: 0 | Status: SHIPPED | OUTPATIENT
Start: 2023-03-13

## 2023-03-14 LAB
LEFT EYE DIABETIC RETINOPATHY: NORMAL
LEFT EYE DIABETIC RETINOPATHY: NORMAL
RIGHT EYE DIABETIC RETINOPATHY: NORMAL
RIGHT EYE DIABETIC RETINOPATHY: NORMAL

## 2023-04-23 DIAGNOSIS — E11.51 TYPE 2 DIABETES MELLITUS WITH DIABETIC PERIPHERAL ANGIOPATHY WITHOUT GANGRENE, WITHOUT LONG-TERM CURRENT USE OF INSULIN (HCC): ICD-10-CM

## 2023-04-24 RX ORDER — GLIPIZIDE 5 MG/1
TABLET, FILM COATED, EXTENDED RELEASE ORAL
Qty: 90 TABLET | Refills: 0 | Status: SHIPPED | OUTPATIENT
Start: 2023-04-24

## 2023-04-28 ENCOUNTER — TELEPHONE (OUTPATIENT)
Dept: ADMINISTRATIVE | Facility: OTHER | Age: 83
End: 2023-04-28

## 2023-04-28 ENCOUNTER — OFFICE VISIT (OUTPATIENT)
Dept: FAMILY MEDICINE CLINIC | Facility: CLINIC | Age: 83
End: 2023-04-28

## 2023-04-28 VITALS
HEIGHT: 69 IN | BODY MASS INDEX: 30.51 KG/M2 | HEART RATE: 79 BPM | WEIGHT: 206 LBS | OXYGEN SATURATION: 99 % | SYSTOLIC BLOOD PRESSURE: 144 MMHG | TEMPERATURE: 97.6 F | DIASTOLIC BLOOD PRESSURE: 80 MMHG

## 2023-04-28 DIAGNOSIS — I77.9 PERIPHERAL ARTERIAL OCCLUSIVE DISEASE (HCC): ICD-10-CM

## 2023-04-28 DIAGNOSIS — F17.200 TOBACCO DEPENDENCE SYNDROME: ICD-10-CM

## 2023-04-28 DIAGNOSIS — E78.2 MIXED HYPERLIPIDEMIA: ICD-10-CM

## 2023-04-28 DIAGNOSIS — I10 HTN (HYPERTENSION), BENIGN: ICD-10-CM

## 2023-04-28 DIAGNOSIS — E11.51 TYPE 2 DIABETES MELLITUS WITH DIABETIC PERIPHERAL ANGIOPATHY WITHOUT GANGRENE, WITHOUT LONG-TERM CURRENT USE OF INSULIN (HCC): Primary | ICD-10-CM

## 2023-04-28 PROBLEM — Z01.818 PRE-OPERATIVE CLEARANCE: Status: RESOLVED | Noted: 2023-01-18 | Resolved: 2023-04-28

## 2023-04-28 RX ORDER — PHENAZOPYRIDINE HYDROCHLORIDE 100 MG/1
100 TABLET, FILM COATED ORAL 3 TIMES DAILY
COMMUNITY
Start: 2023-02-02

## 2023-04-28 NOTE — LETTER
Diabetic Eye Exam Form    Date Requested: 23  Patient: Vic Andrea  Patient : 1940   Referring Provider: Gonzalo Franco MD      DIABETIC Eye Exam Date _______________________________      Type of Exam MUST be documented for Diabetic Eye Exams  Please CHECK ONE  Retinal Exam       Dilated Retinal Exam       OCT       Optomap-Iris Exam      Fundus Photography       Left Eye - Please check Retinopathy or No Retinopathy        Exam did show retinopathy    Exam did not show retinopathy       Right Eye - Please check Retinopathy or No Retinopathy       Exam did show retinopathy    Exam did not show retinopathy       Comments __________________________________________________________    Practice Providing Exam ______________________________________________    Exam Performed By (print name) _______________________________________      Provider Signature ___________________________________________________      These reports are needed for  compliance  Please fax this completed form and a copy of the Diabetic Eye Exam report to our office located at [place] [address] as soon as possible via Fax [phone] ashwini Pino: Phone [phone]  We thank you for your assistance in treating our mutual patient     Dr David Sellers - [phone] F [phone]
Left arm;

## 2023-04-28 NOTE — TELEPHONE ENCOUNTER
----- Message from Malathi Hunter sent at 4/28/2023  9:19 AM EDT -----  Regarding: DM eye exam  04/28/23 9:19 AM    Hello, our patient Agata Melgar has had Diabetic Eye Exam completed/performed  Please assist in updating the patient chart by making an External outreach to Dr Starks facility located in [address] [place] [address]  The date of service is in march      Thank you,  LEYDI Hunter PG, RD PRIMARY CARE

## 2023-04-28 NOTE — PROGRESS NOTES
Assessment/Plan:         Problem List Items Addressed This Visit        Endocrine    Type 2 diabetes mellitus with diabetic peripheral angiopathy without gangrene, without long-term current use of insulin ([facility] [address] ) - Primary       Lab Results   Component Value Date    HGBA1C 6 6 (H) 11/04/2022   Under control  Continue current medication  We will re-evaluate at next office visit  Cardiovascular and Mediastinum    HTN (hypertension), benign     Under control  Continue current medication  We will re-evaluate at next office visit  Peripheral arterial occlusive disease ([facility] [address] )     Under control  Continue current medication  We will re-evaluate at next office visit  Other    Mixed hyperlipidemia     Under control  Continue current medication  We will re-evaluate at next office visit  Tobacco dependence syndrome     Quit smoking  Options for nicotine patch, nicotine gum or any medication discussed with patient  Potential consequences of smoking discussed with patient  Patient seems to be understood well                Subjective:      Patient ID: Usama Car is a 82 y o  male  Patient here for review of chronic medical problems and review of the labs and imaging if it is applicable  Currently has no specific complaints other than mentioned in the review of systems  Denies chest pain, SOB, cough, abdominal pain, nausea, vomiting, fever, chills, lightheadedness, dizziness,headache, tingling or numbness  No bowel or bladder problem  Patient needs renewal on [employer] assistance program from [facility]    All the forms reviewed and filled out for patient      The following portions of the patient's history were reviewed and updated as appropriate:   Past Medical History:  He has a past medical history of Bladder polyps, Colon polyp, Coronary artery disease, Diabetes mellitus ([address] ), DJD (degenerative joint disease), Ear problems, HL (hearing loss), Hyperlipidemia, Hypertension, Kidney stone, Myocardial infarction ([address] ), Obesity, Osteoporosis, and Tinnitus  ,  _______________________________________________________________________  Medical Problems:  does not have any pertinent problems on file ,  _______________________________________________________________________  Past Surgical History:   has a past surgical history that includes Myringotomy w/ tubes; Tonsillectomy; Cholecystectomy; Cataract extraction (11/15/2020); Colonoscopy; Coronary angioplasty with stent; pr cysto/uretero w/lithotripsy &indwell stent insrt (Right, 04/20/2021); [place] retrograde pyelogram (04/20/2021); [place] tooth extraction; Bladder surgery; Cardiac catheterization; and Cataract extraction (12/03/2020)  ,  _______________________________________________________________________  Family History:  family history includes Cancer in his father; Heart disease in his mother ,  _______________________________________________________________________  Social History:   reports that he has been smoking cigarettes  He has a 25 00 pack-year smoking history  He has been exposed to tobacco smoke  He has never used smokeless tobacco  He reports that he does not currently use alcohol  He reports that he does not use drugs  ,  _______________________________________________________________________  Allergies:  is allergic to penicillins     _______________________________________________________________________  Current Outpatient Medications   Medication Sig Dispense Refill   • ASPIRIN PO Take 81 mg by mouth in the morning     • clopidogrel (PLAVIX) 75 mg tablet TAKE ONE TABLET BY MOUTH EVERY DAY 90 tablet 0   • Dulaglutide 1 5 MG/0 5ML SOPN once a week     • finasteride (PROSCAR) 5 mg tablet Daily     • furosemide (LASIX) 20 mg tablet TAKE ONE TABLET BY MOUTH EVERY DAY 90 tablet 0   • glipiZIDE (GLUCOTROL XL) 5 mg 24 hr tablet TAKE ONE TABLET BY MOUTH EVERY DAY 90 tablet 0   • GLUCOSAMINE-CHONDROITIN PO Take by mouth in the morning     • losartan (COZAAR) 50 mg tablet TAKE ONE TABLET BY MOUTH EVERY DAY 90 tablet 0   • metFORMIN (GLUCOPHAGE) 1000 MG tablet TAKE ONE TABLET BY MOUTH TWICE A DAY WITH MEALS 180 tablet 0   • metoprolol succinate (TOPROL-XL) 50 mg 24 hr tablet TAKE ONE TABLET BY MOUTH EVERY DAY 90 tablet 0   • phenazopyridine (PYRIDIUM) 100 mg tablet Take 100 mg by mouth Three times a day     • potassium chloride (K-DUR,KLOR-CON) 20 mEq tablet TAKE ONE TABLET BY MOUTH EVERY DAY 90 tablet 0   • rosuvastatin (CRESTOR) 10 MG tablet TAKE ONE TABLET BY MOUTH EVERY DAY AT BEDTIME 90 tablet 0   • tamsulosin (FLOMAX) 0 4 mg TAKE ONE CAPSULE BY MOUTH EVERY DAY 90 capsule 0   • testosterone cypionate (DEPO-TESTOSTERONE) 200 mg/mL SOLN INJECT 0 25ML INTRAMUSCULARLY ONCE WEEKLY AS DIRECTED 3 mL 0     No current facility-administered medications for this visit      _______________________________________________________________________  Review of Systems   Constitutional: Negative for chills, fatigue and fever  HENT: Negative for congestion, ear pain, rhinorrhea, sneezing and sore throat  Eyes: Negative for redness, itching and visual disturbance  Respiratory: Negative for cough, chest tightness and shortness of breath  Cardiovascular: Negative for chest pain, palpitations and leg swelling  Gastrointestinal: Negative for abdominal pain, blood in stool, diarrhea, nausea and vomiting  Endocrine: Negative for cold intolerance and heat intolerance  Genitourinary: Negative for dysuria, frequency and urgency  Musculoskeletal: Negative for arthralgias, back pain and myalgias  Skin: Negative for color change and rash  Neurological: Negative for dizziness, weakness, light-headedness, numbness and headaches  Hematological: Does not bruise/bleed easily  Psychiatric/Behavioral: Negative for agitation, behavioral problems and confusion           Objective:  Vitals:    04/28/23 "0916   BP: 144/80   BP Location: Left arm   Patient Position: Sitting   Cuff Size: Adult   Pulse: 79   Temp: 97 6 °F (36 4 °C)   TempSrc: Tympanic   SpO2: 99%   Weight: 93 4 kg (206 lb)   Height: 5' 9\" (1 753 m)     Body mass index is 30 42 kg/m²  Physical Exam  Vitals and nursing note reviewed  Constitutional:       General: He is not in acute distress  Appearance: Normal appearance  He is not ill-appearing, toxic-appearing or diaphoretic  HENT:      Head: Normocephalic and atraumatic  Right Ear: Tympanic membrane normal       Left Ear: Tympanic membrane normal       Nose: Nose normal  No congestion  Mouth/Throat:      Mouth: Mucous membranes are moist    Eyes:      General: No scleral icterus  Right eye: No discharge  Left eye: No discharge  Extraocular Movements: Extraocular movements intact  Conjunctiva/sclera: Conjunctivae normal       Pupils: Pupils are equal, round, and reactive to light  Cardiovascular:      Rate and Rhythm: Normal rate and regular rhythm  Pulses: Normal pulses  Heart sounds: Normal heart sounds  No murmur heard  No gallop  Pulmonary:      Effort: Pulmonary effort is normal  No respiratory distress  Breath sounds: Normal breath sounds  No wheezing, rhonchi or rales  Abdominal:      General: Abdomen is flat  Bowel sounds are normal  There is no distension  Palpations: Abdomen is soft  Tenderness: There is no abdominal tenderness  There is no guarding  Musculoskeletal:         General: No swelling or tenderness  Normal range of motion  Cervical back: Normal range of motion and neck supple  No rigidity  Right lower leg: No edema  Left lower leg: No edema  Lymphadenopathy:      Cervical: No cervical adenopathy  Skin:     General: Skin is warm  Capillary Refill: Capillary refill takes 2 to 3 seconds  Coloration: Skin is not jaundiced  Findings: No bruising or rash     Neurological: " General: No focal deficit present  Mental Status: He is alert and oriented to person, place, and time  Mental status is at baseline        Gait: Gait normal    Psychiatric:         Mood and Affect: Mood normal

## 2023-04-30 DIAGNOSIS — E87.6 HYPOKALEMIA: ICD-10-CM

## 2023-04-30 DIAGNOSIS — E11.51 TYPE 2 DIABETES MELLITUS WITH DIABETIC PERIPHERAL ANGIOPATHY WITHOUT GANGRENE, WITHOUT LONG-TERM CURRENT USE OF INSULIN (HCC): ICD-10-CM

## 2023-05-01 RX ORDER — POTASSIUM CHLORIDE 20 MEQ/1
TABLET, EXTENDED RELEASE ORAL
Qty: 90 TABLET | Refills: 0 | Status: SHIPPED | OUTPATIENT
Start: 2023-05-01

## 2023-05-01 NOTE — TELEPHONE ENCOUNTER
Upon review of the In Basket request and the patient's chart, initial outreach has been made via fax to facility  Please see Contacts section for details       Thank you  Pati Lau

## 2023-05-04 NOTE — TELEPHONE ENCOUNTER
Upon review of the In Basket request we have found this is a duplicate request and no further action is needed  This request was completed upon initial request, the patient chart is up to date, and this message will now be closed  Any additional questions or concerns should be emailed to the Practice Liaisons via the appropriate education email address, please do not reply via In Basket      Thank you  Pati Lau

## 2023-05-29 DIAGNOSIS — I10 BENIGN ESSENTIAL HYPERTENSION: ICD-10-CM

## 2023-05-30 RX ORDER — LOSARTAN POTASSIUM 50 MG/1
TABLET ORAL
Qty: 90 TABLET | Refills: 0 | Status: SHIPPED | OUTPATIENT
Start: 2023-05-30

## 2023-06-04 DIAGNOSIS — E78.2 MIXED HYPERLIPIDEMIA: ICD-10-CM

## 2023-06-05 RX ORDER — ROSUVASTATIN CALCIUM 10 MG/1
TABLET, COATED ORAL
Qty: 90 TABLET | Refills: 0 | Status: SHIPPED | OUTPATIENT
Start: 2023-06-05

## 2023-06-15 ENCOUNTER — TELEPHONE (OUTPATIENT)
Dept: FAMILY MEDICINE CLINIC | Facility: CLINIC | Age: 83
End: 2023-06-15

## 2023-06-15 NOTE — TELEPHONE ENCOUNTER
Maricruz Barron or Brigid Peña, it's [employer]  Listen, Doctor Adriel [place] wants me to go for blood work and I was positive I have a script, but I looked all over and I don't have one  Would you please ask him to write up the current one and considering whether or not? Just gotta check for testosterone this time  I know it can only be done once a year  Would you please  mail it to me? Thanks  I really appreciate it  Bye  My number, by the way, is [phone]  Thanks  You received a voice mail from [facility]

## 2023-06-18 DIAGNOSIS — I10 BENIGN ESSENTIAL HYPERTENSION: ICD-10-CM

## 2023-06-18 DIAGNOSIS — E34.9 TESTOSTERONE DEFICIENCY: ICD-10-CM

## 2023-06-19 RX ORDER — TESTOSTERONE CYPIONATE 200 MG/ML
INJECTION, SOLUTION INTRAMUSCULAR
Qty: 3 ML | Refills: 0 | Status: SHIPPED | OUTPATIENT
Start: 2023-06-19

## 2023-06-19 RX ORDER — METOPROLOL SUCCINATE 50 MG/1
TABLET, EXTENDED RELEASE ORAL
Qty: 90 TABLET | Refills: 0 | Status: SHIPPED | OUTPATIENT
Start: 2023-06-19

## 2023-07-06 ENCOUNTER — OFFICE VISIT (OUTPATIENT)
Dept: FAMILY MEDICINE CLINIC | Facility: CLINIC | Age: 83
End: 2023-07-06
Payer: MEDICARE

## 2023-07-06 VITALS
RESPIRATION RATE: 18 BRPM | TEMPERATURE: 97.2 F | HEIGHT: 69 IN | DIASTOLIC BLOOD PRESSURE: 72 MMHG | BODY MASS INDEX: 30.66 KG/M2 | OXYGEN SATURATION: 98 % | WEIGHT: 207 LBS | SYSTOLIC BLOOD PRESSURE: 140 MMHG | HEART RATE: 78 BPM

## 2023-07-06 DIAGNOSIS — I77.9 PERIPHERAL ARTERIAL OCCLUSIVE DISEASE (HCC): ICD-10-CM

## 2023-07-06 DIAGNOSIS — E11.51 TYPE 2 DIABETES MELLITUS WITH DIABETIC PERIPHERAL ANGIOPATHY WITHOUT GANGRENE, WITHOUT LONG-TERM CURRENT USE OF INSULIN (HCC): Primary | ICD-10-CM

## 2023-07-06 DIAGNOSIS — F17.200 TOBACCO DEPENDENCE SYNDROME: ICD-10-CM

## 2023-07-06 DIAGNOSIS — I10 HTN (HYPERTENSION), BENIGN: ICD-10-CM

## 2023-07-06 DIAGNOSIS — I25.10 ATHEROSCLEROSIS OF NATIVE CORONARY ARTERY OF NATIVE HEART WITHOUT ANGINA PECTORIS: ICD-10-CM

## 2023-07-06 DIAGNOSIS — E78.2 MIXED HYPERLIPIDEMIA: ICD-10-CM

## 2023-07-06 PROCEDURE — 99213 OFFICE O/P EST LOW 20 MIN: CPT

## 2023-07-06 RX ORDER — ATORVASTATIN CALCIUM 20 MG/1
20 TABLET, FILM COATED ORAL
COMMUNITY
End: 2023-07-06 | Stop reason: ALTCHOICE

## 2023-07-06 NOTE — PROGRESS NOTES
Assessment/Plan:         Problem List Items Addressed This Visit        Endocrine    Type 2 diabetes mellitus with diabetic peripheral angiopathy without gangrene, without long-term current use of insulin ([address]) - Primary       Lab Results   Component Value Date    HGBA1C 6.9 (H) 06/29/2023   Under control. Continue current medication. We will re-evaluate at next office visit. Cardiovascular and Mediastinum    HTN (hypertension), benign     Under control. Continue current medication. We will re-evaluate at next office visit. Atherosclerosis of coronary artery without angina pectoris     Under control. Continue current medication. We will re-evaluate at next office visit. Has been followed by a cardiologist         Peripheral arterial occlusive disease ([address])     Under control. Continue current medication. We will re-evaluate at next office visit. Other    Mixed hyperlipidemia     Under control. Continue current medication. We will re-evaluate at next office visit. Tobacco dependence syndrome     Quit smoking  Options for nicotine patch, nicotine gum or any medication discussed with patient  Potential consequences of smoking discussed with patient  Patient seems to be understood well                Subjective:      Patient ID: Marleny Lam is a 82 y.o. male. Patient here for review of chronic medical problems and  the labs and imaging if it is applicable. Currently has no specific complaints other than mentioned in the review of systems  Denies chest pain, SOB, cough, abdominal pain, nausea, vomiting, fever, chills, lightheadedness, dizziness,headache, tingling or numbness. No bowel or bladder problem.         The following portions of the patient's history were reviewed and updated as appropriate:   Past Medical History:  He has a past medical history of Bladder polyps, Colon polyp, Coronary artery disease, Diabetes mellitus ([address]), DJD (degenerative joint disease), Ear problems, HL (hearing loss), Hyperlipidemia, Hypertension, Kidney stone, Myocardial infarction ([address]), Obesity, Osteoporosis, and Tinnitus. ,  _______________________________________________________________________  Medical Problems:  does not have any pertinent problems on file.,  _______________________________________________________________________  Past Surgical History:   has a past surgical history that includes Myringotomy w/ tubes; Tonsillectomy; Cholecystectomy; Cataract extraction (11/15/2020); Colonoscopy; Coronary angioplasty with stent; pr cysto/uretero w/lithotripsy &indwell stent insrt (Right, 04/20/2021); [place] retrograde pyelogram (04/20/2021); [place] tooth extraction; Bladder surgery; Cardiac catheterization; and Cataract extraction (12/03/2020). ,  _______________________________________________________________________  Family History:  family history includes Cancer in his father; Heart disease in his mother.,  _______________________________________________________________________  Social History:   reports that he has been smoking cigarettes. He has a 25.00 pack-year smoking history. He has been exposed to tobacco smoke. He has never used smokeless tobacco. He reports that he does not currently use alcohol. He reports that he does not use drugs. ,  _______________________________________________________________________  Allergies:  is allergic to penicillins. .  _______________________________________________________________________  Current Outpatient Medications   Medication Sig Dispense Refill   • clopidogrel (PLAVIX) 75 mg tablet TAKE ONE TABLET BY MOUTH EVERY DAY 90 tablet 0   • Dulaglutide 1.5 MG/0.5ML SOPN once a week     • finasteride (PROSCAR) 5 mg tablet Daily     • furosemide (LASIX) 20 mg tablet TAKE ONE TABLET BY MOUTH EVERY DAY 90 tablet 0   • glipiZIDE (GLUCOTROL XL) 5 mg 24 hr tablet TAKE ONE TABLET BY MOUTH EVERY DAY 90 tablet 0   • GLUCOSAMINE-CHONDROITIN PO Take by mouth in the morning     • losartan (COZAAR) 50 mg tablet TAKE ONE TABLET BY MOUTH EVERY DAY 90 tablet 0   • metFORMIN (GLUCOPHAGE) 1000 MG tablet TAKE ONE TABLET BY MOUTH TWICE A DAY WITH MEALS 180 tablet 0   • metoprolol succinate (TOPROL-XL) 50 mg 24 hr tablet TAKE ONE TABLET BY MOUTH EVERY DAY 90 tablet 0   • potassium chloride (K-DUR,KLOR-CON) 20 mEq tablet TAKE ONE TABLET BY MOUTH EVERY DAY 90 tablet 0   • rosuvastatin (CRESTOR) 10 MG tablet TAKE ONE TABLET BY MOUTH EVERY DAY AT BEDTIME 90 tablet 0   • tamsulosin (FLOMAX) 0.4 mg TAKE ONE CAPSULE BY MOUTH EVERY DAY 90 capsule 0   • testosterone cypionate (DEPO-TESTOSTERONE) 200 mg/mL SOLN INJECT 0.25ML INTRAMUSCULARLY ONCE WEEKLY AS DIRECTED 3 mL 0     No current facility-administered medications for this visit.     _______________________________________________________________________  Review of Systems   Constitutional: Negative for chills, fatigue and fever. HENT: Negative for congestion, ear pain, rhinorrhea, sneezing and sore throat. Eyes: Negative for redness, itching and visual disturbance. Respiratory: Negative for cough, chest tightness and shortness of breath. Cardiovascular: Negative for chest pain, palpitations and leg swelling. Gastrointestinal: Negative for abdominal pain, blood in stool, diarrhea, nausea and vomiting. Endocrine: Negative for cold intolerance and heat intolerance. Genitourinary: Negative for dysuria, frequency and urgency. Musculoskeletal: Positive for back pain. Negative for arthralgias and myalgias. Skin: Negative for color change and rash. Neurological: Negative for dizziness, weakness, light-headedness, numbness and headaches. Hematological: Does not bruise/bleed easily. Psychiatric/Behavioral: Negative for agitation, behavioral problems and confusion.          Objective:  Vitals:    07/06/23 1003   BP: 140/72   BP Location: Left arm   Patient Position: Sitting Cuff Size: Standard   Pulse: 78   Resp: 18   Temp: (!) 97.2 °F (36.2 °C)   TempSrc: Tympanic   SpO2: 98%   Weight: 93.9 kg (207 lb)   Height: 5' 9" (1.753 m)     Body mass index is 30.57 kg/m². Physical Exam  Vitals and nursing note reviewed. Constitutional:       General: He is not in acute distress. Appearance: Normal appearance. He is obese. He is not ill-appearing, toxic-appearing or diaphoretic. HENT:      Head: Normocephalic and atraumatic. Right Ear: Tympanic membrane normal.      Left Ear: Tympanic membrane normal.      Nose: Nose normal. No congestion. Mouth/Throat:      Mouth: Mucous membranes are moist.   Eyes:      General: No scleral icterus. Right eye: No discharge. Left eye: No discharge. Extraocular Movements: Extraocular movements intact. Conjunctiva/sclera: Conjunctivae normal.      Pupils: Pupils are equal, round, and reactive to light. Cardiovascular:      Rate and Rhythm: Normal rate and regular rhythm. Pulses: Normal pulses. Heart sounds: Normal heart sounds. No murmur heard. No gallop. Pulmonary:      Effort: Pulmonary effort is normal. No respiratory distress. Breath sounds: Normal breath sounds. No wheezing, rhonchi or rales. Abdominal:      General: Abdomen is flat. Bowel sounds are normal. There is no distension. Palpations: Abdomen is soft. Tenderness: There is no abdominal tenderness. There is no guarding. Musculoskeletal:         General: No swelling or tenderness. Normal range of motion. Cervical back: Normal range of motion and neck supple. No rigidity. Right lower leg: No edema. Left lower leg: No edema. Lymphadenopathy:      Cervical: No cervical adenopathy. Skin:     General: Skin is warm. Capillary Refill: Capillary refill takes 2 to 3 seconds. Coloration: Skin is not jaundiced. Findings: No bruising or rash.    Neurological:      General: No focal deficit present. Mental Status: He is alert and oriented to person, place, and time. Mental status is at baseline.       Gait: Gait normal.   Psychiatric:         Mood and Affect: Mood normal.         Behavior: Behavior normal.

## 2023-07-06 NOTE — ASSESSMENT & PLAN NOTE
Lab Results   Component Value Date    HGBA1C 6.9 (H) 06/29/2023   Under control. Continue current medication. We will re-evaluate at next office visit.

## 2023-07-11 ENCOUNTER — TELEPHONE (OUTPATIENT)
Dept: FAMILY MEDICINE CLINIC | Facility: CLINIC | Age: 83
End: 2023-07-11

## 2023-07-16 DIAGNOSIS — R60.0 EDEMA OF BOTH LOWER LEGS: ICD-10-CM

## 2023-07-16 DIAGNOSIS — I25.10 ATHEROSCLEROSIS OF NATIVE CORONARY ARTERY OF NATIVE HEART WITHOUT ANGINA PECTORIS: ICD-10-CM

## 2023-07-17 DIAGNOSIS — E11.9 TYPE 2 DIABETES MELLITUS WITHOUT COMPLICATION, UNSPECIFIED WHETHER LONG TERM INSULIN USE (HCC): Primary | ICD-10-CM

## 2023-07-17 RX ORDER — SYRINGE WITH NEEDLE, 1 ML 25GX5/8"
SYRINGE, EMPTY DISPOSABLE MISCELLANEOUS
Qty: 100 EACH | Refills: 1 | Status: SHIPPED | OUTPATIENT
Start: 2023-07-17

## 2023-07-17 RX ORDER — FUROSEMIDE 20 MG/1
TABLET ORAL
Qty: 90 TABLET | Refills: 0 | Status: SHIPPED | OUTPATIENT
Start: 2023-07-17

## 2023-07-17 RX ORDER — CLOPIDOGREL BISULFATE 75 MG/1
TABLET ORAL
Qty: 90 TABLET | Refills: 0 | Status: SHIPPED | OUTPATIENT
Start: 2023-07-17

## 2023-07-23 DIAGNOSIS — E11.51 TYPE 2 DIABETES MELLITUS WITH DIABETIC PERIPHERAL ANGIOPATHY WITHOUT GANGRENE, WITHOUT LONG-TERM CURRENT USE OF INSULIN (HCC): ICD-10-CM

## 2023-07-24 RX ORDER — GLIPIZIDE 5 MG/1
TABLET, FILM COATED, EXTENDED RELEASE ORAL
Qty: 90 TABLET | Refills: 0 | Status: SHIPPED | OUTPATIENT
Start: 2023-07-24

## 2023-07-30 DIAGNOSIS — E11.51 TYPE 2 DIABETES MELLITUS WITH DIABETIC PERIPHERAL ANGIOPATHY WITHOUT GANGRENE, WITHOUT LONG-TERM CURRENT USE OF INSULIN (HCC): ICD-10-CM

## 2023-08-27 DIAGNOSIS — I10 BENIGN ESSENTIAL HYPERTENSION: ICD-10-CM

## 2023-08-28 RX ORDER — LOSARTAN POTASSIUM 50 MG/1
TABLET ORAL
Qty: 90 TABLET | Refills: 0 | Status: SHIPPED | OUTPATIENT
Start: 2023-08-28

## 2023-09-03 DIAGNOSIS — E78.2 MIXED HYPERLIPIDEMIA: ICD-10-CM

## 2023-09-05 RX ORDER — ROSUVASTATIN CALCIUM 10 MG/1
TABLET, COATED ORAL
Qty: 90 TABLET | Refills: 0 | Status: SHIPPED | OUTPATIENT
Start: 2023-09-05

## 2023-09-12 ENCOUNTER — NURSE TRIAGE (OUTPATIENT)
Age: 83
End: 2023-09-12

## 2023-09-12 ENCOUNTER — OFFICE VISIT (OUTPATIENT)
Dept: FAMILY MEDICINE CLINIC | Facility: CLINIC | Age: 83
End: 2023-09-12
Payer: MEDICARE

## 2023-09-12 VITALS
BODY MASS INDEX: 30.42 KG/M2 | HEART RATE: 78 BPM | HEIGHT: 69 IN | RESPIRATION RATE: 16 BRPM | OXYGEN SATURATION: 97 % | DIASTOLIC BLOOD PRESSURE: 76 MMHG | TEMPERATURE: 97.7 F | WEIGHT: 205.4 LBS | SYSTOLIC BLOOD PRESSURE: 138 MMHG

## 2023-09-12 DIAGNOSIS — G89.29 CHRONIC BILATERAL LOW BACK PAIN WITHOUT SCIATICA: Primary | ICD-10-CM

## 2023-09-12 DIAGNOSIS — F17.200 TOBACCO DEPENDENCE SYNDROME: ICD-10-CM

## 2023-09-12 DIAGNOSIS — E11.51 TYPE 2 DIABETES MELLITUS WITH DIABETIC PERIPHERAL ANGIOPATHY WITHOUT GANGRENE, WITHOUT LONG-TERM CURRENT USE OF INSULIN (HCC): ICD-10-CM

## 2023-09-12 DIAGNOSIS — R26.81 UNSTEADY GAIT: ICD-10-CM

## 2023-09-12 DIAGNOSIS — M54.50 CHRONIC BILATERAL LOW BACK PAIN WITHOUT SCIATICA: Primary | ICD-10-CM

## 2023-09-12 PROCEDURE — 99213 OFFICE O/P EST LOW 20 MIN: CPT

## 2023-09-12 RX ORDER — MELOXICAM 7.5 MG/1
7.5 TABLET ORAL DAILY
Qty: 15 TABLET | Refills: 0 | Status: SHIPPED | OUTPATIENT
Start: 2023-09-12

## 2023-09-12 NOTE — TELEPHONE ENCOUNTER
----- Message from Jeane Vila sent at 9/12/2023  9:43 AM EDT -----  Patient called with complaints of leg weakness and slight pain.

## 2023-09-12 NOTE — PROGRESS NOTES
Assessment/Plan:         Problem List Items Addressed This Visit        Endocrine    Type 2 diabetes mellitus with diabetic peripheral angiopathy without gangrene, without long-term current use of insulin ([address])       Lab Results   Component Value Date    HGBA1C 6.9 (H) 06/29/2023   Under control. Continue current medication. We will re-evaluate at next office visit. Other    Tobacco dependence syndrome     Quit smoking  Options for nicotine patch, nicotine gum or any medication discussed with patient  Potential consequences of smoking discussed with patient  Patient seems to be understood well             Chronic bilateral low back pain without sciatica - Primary     Had multiple epidural shots in the past  Willing to try NSAIDs         Relevant Medications    meloxicam (Mobic) 7.5 mg tablet    Other Relevant Orders    Ambulatory referral to Physical Therapy    Unsteady gait    Relevant Orders    Ambulatory referral to Physical Therapy         Subjective:      Patient ID: Jef Gutierrez is a 82 y.o. male. Patient here for sick visit. He has been to neurosurgeon Dr. Jeanette Moscoso for multiple epidural shots in the past but patient pain continues in the lower back and now he has both leg pain and unsteady gait Dr. Joan Mcnamara since then suggested him to try NSAIDs no tingling or numbness. No weakness but he is getting unsteady when he walks. The following portions of the patient's history were reviewed and updated as appropriate:   Past Medical History:  He has a past medical history of Bladder polyps, Colon polyp, Coronary artery disease, Diabetes mellitus ([address]), DJD (degenerative joint disease), Ear problems, HL (hearing loss), Hyperlipidemia, Hypertension, Kidney stone, Myocardial infarction ([address]), Obesity, Osteoporosis, and Tinnitus. ,  _______________________________________________________________________  Medical Problems:  does not have any pertinent problems on file.,  _______________________________________________________________________  Past Surgical History:   has a past surgical history that includes Myringotomy w/ tubes; Tonsillectomy; Cholecystectomy; Cataract extraction (11/15/2020); Colonoscopy; Coronary angioplasty with stent; pr cysto/uretero w/lithotripsy &indwell stent insrt (Right, 04/20/2021); [place] retrograde pyelogram (04/20/2021); [place] tooth extraction; Bladder surgery; Cardiac catheterization; and Cataract extraction (12/03/2020). ,  _______________________________________________________________________  Family History:  family history includes Cancer in his father; Heart disease in his mother.,  _______________________________________________________________________  Social History:   reports that he has been smoking cigarettes. He has a 25.00 pack-year smoking history. He has been exposed to tobacco smoke. He has never used smokeless tobacco. He reports that he does not currently use alcohol. He reports that he does not use drugs. ,  _______________________________________________________________________  Allergies:  is allergic to penicillins. .  _______________________________________________________________________  Current Outpatient Medications   Medication Sig Dispense Refill   • B-D 3CC LUER-KAYLEY SYR 22GX1" 22G X 1" 3 ML MISC USE ONE SYRINGE INTRAMUSCULARLY ONCE WEEKLY AS DIRECTED 100 each 1   • clopidogrel (PLAVIX) 75 mg tablet TAKE ONE TABLET BY MOUTH EVERY DAY 90 tablet 0   • Dulaglutide 1.5 MG/0.5ML SOPN once a week     • finasteride (PROSCAR) 5 mg tablet Daily     • furosemide (LASIX) 20 mg tablet TAKE ONE TABLET BY MOUTH EVERY DAY 90 tablet 0   • glipiZIDE (GLUCOTROL XL) 5 mg 24 hr tablet TAKE ONE TABLET BY MOUTH EVERY DAY 90 tablet 0   • GLUCOSAMINE-CHONDROITIN PO Take by mouth in the morning     • losartan (COZAAR) 50 mg tablet TAKE ONE TABLET BY MOUTH EVERY DAY 90 tablet 0   • meloxicam (Mobic) 7.5 mg tablet Take 1 tablet (7.5 mg total) by mouth daily 15 tablet 0   • metFORMIN (GLUCOPHAGE) 1000 MG tablet TAKE ONE TABLET BY MOUTH TWICE A DAY WITH MEALS 180 tablet 0   • metoprolol succinate (TOPROL-XL) 50 mg 24 hr tablet TAKE ONE TABLET BY MOUTH EVERY DAY 90 tablet 0   • potassium chloride (K-DUR,KLOR-CON) 20 mEq tablet TAKE ONE TABLET BY MOUTH EVERY DAY 90 tablet 0   • rosuvastatin (CRESTOR) 10 MG tablet TAKE ONE TABLET BY MOUTH EVERY DAY AT BEDTIME 90 tablet 0   • tamsulosin (FLOMAX) 0.4 mg TAKE ONE CAPSULE BY MOUTH EVERY DAY 90 capsule 0   • testosterone cypionate (DEPO-TESTOSTERONE) 200 mg/mL SOLN INJECT 0.25ML INTRAMUSCULARLY ONCE WEEKLY AS DIRECTED 3 mL 0     No current facility-administered medications for this visit.     _______________________________________________________________________  Review of Systems   Constitutional: Negative for chills, fatigue and fever. HENT: Negative for congestion, ear pain, rhinorrhea, sneezing and sore throat. Eyes: Negative for redness, itching and visual disturbance. Respiratory: Negative for cough, chest tightness and shortness of breath. Cardiovascular: Negative for chest pain, palpitations and leg swelling. Gastrointestinal: Negative for abdominal pain, blood in stool, diarrhea, nausea and vomiting. Endocrine: Negative for cold intolerance and heat intolerance. Genitourinary: Negative for dysuria, frequency and urgency. Musculoskeletal: Positive for arthralgias (bilateral leg pain) and back pain (middle of lower back). Negative for myalgias. Skin: Negative for color change and rash. Neurological: Negative for dizziness, weakness, light-headedness, numbness and headaches. Hematological: Does not bruise/bleed easily. Psychiatric/Behavioral: Negative for agitation, behavioral problems and confusion.          Objective:  Vitals:    09/12/23 1543   BP: 138/76   BP Location: Left arm   Patient Position: Sitting   Cuff Size: Standard   Pulse: 78   Resp: 16   Temp: 97.7 °F (36.5 °C) TempSrc: Tympanic   SpO2: 97%   Weight: 93.2 kg (205 lb 6.4 oz)   Height: 5' 9" (1.753 m)     Body mass index is 30.33 kg/m². Physical Exam  Vitals and nursing note reviewed. Constitutional:       General: He is not in acute distress. Appearance: Normal appearance. He is obese. He is not ill-appearing, toxic-appearing or diaphoretic. HENT:      Head: Normocephalic and atraumatic. Right Ear: Tympanic membrane normal.      Left Ear: Tympanic membrane normal.      Nose: Nose normal. No congestion. Mouth/Throat:      Mouth: Mucous membranes are moist.   Eyes:      General: No scleral icterus. Right eye: No discharge. Left eye: No discharge. Extraocular Movements: Extraocular movements intact. Conjunctiva/sclera: Conjunctivae normal.      Pupils: Pupils are equal, round, and reactive to light. Cardiovascular:      Rate and Rhythm: Normal rate and regular rhythm. Pulses: Normal pulses. Heart sounds: Normal heart sounds. No murmur heard. No gallop. Pulmonary:      Effort: Pulmonary effort is normal. No respiratory distress. Breath sounds: Normal breath sounds. No wheezing, rhonchi or rales. Abdominal:      General: Abdomen is flat. Bowel sounds are normal. There is no distension. Palpations: Abdomen is soft. Tenderness: There is no abdominal tenderness. There is no guarding. Musculoskeletal:         General: No swelling or tenderness. Normal range of motion. Cervical back: Normal range of motion and neck supple. No rigidity. Right lower leg: No edema. Left lower leg: No edema. Lymphadenopathy:      Cervical: No cervical adenopathy. Skin:     General: Skin is warm. Capillary Refill: Capillary refill takes 2 to 3 seconds. Coloration: Skin is not jaundiced. Findings: No bruising or rash. Neurological:      General: No focal deficit present.       Mental Status: He is alert and oriented to person, place, and time. Mental status is at baseline.       Gait: Gait normal.   Psychiatric:         Mood and Affect: Mood normal.         Behavior: Behavior normal.

## 2023-09-12 NOTE — TELEPHONE ENCOUNTER
Patient called in requesting OV to follow up on ongoing bilateral leg weakness and moderate low back/leg pain. Patient is comfortable with short distances and ambulating around home. Increased symptoms with extended time on feet and distances to walk. OV scheduled with PCP today at 3:40 PM.    Reason for Disposition  • Patient wants to be seen    Answer Assessment - Initial Assessment Questions  1. SYMPTOM: "What is the main symptom you are concerned about?" (e.g., weakness, numbness)      Bilateral leg weakness  2. ONSET: "When did this start?" (minutes, hours, days; while sleeping)      Ongoing; not new   3. LAST NORMAL: "When was the last time you were normal (no symptoms)?"      Problems standing, walking beyond long distance and being upright for extended period of time  4. PATTERN "Does this come and go, or has it been constant since it started?"  "Is it present now?"     Always present  5. CARDIAC SYMPTOMS: "Have you had any of the following symptoms: chest pain, difficulty breathing, palpitations?"      Denies  6. NEUROLOGIC SYMPTOMS: "Have you had any of the following symptoms: headache, dizziness, vision loss, double vision, changes in speech, unsteady on your feet?"      Unsteady on feet when upright for extended period of time  7. OTHER SYMPTOMS: "Do you have any other symptoms?"      Moderate pain (5) from lower back concerns. 8.  PREGNANCY: "Is there any chance you are pregnant?" "When was your last menstrual period?"    N/a    Protocols used: NEUROLOGIC DEFICIT-ADULT-[place]

## 2023-09-13 ENCOUNTER — TELEPHONE (OUTPATIENT)
Dept: ADMINISTRATIVE | Facility: OTHER | Age: 83
End: 2023-09-13

## 2023-09-13 ENCOUNTER — TELEPHONE (OUTPATIENT)
Dept: FAMILY MEDICINE CLINIC | Facility: MEDICAL CENTER | Age: 83
End: 2023-09-13

## 2023-09-13 NOTE — LETTER
Diabetic Foot Exam Form    Date Requested: 23  Patient: Hernandez Harrington  Patient : 1940   Referring Provider: Brunilda Saeed MD    Diabetic Foot Exam Performed with shoes and socks removed        Yes         No     Date of Diabetic Foot Exam ______________________________  Risk Score ____________________________________________    Left Foot       Visual Inspection         Monofilament Testing Sensory Exam        Pedal Pulses         Additional Comments         Right Foot      Visual Inspection         Monofilament Testing Sensory Exam       Pedal Pulses         Additional Comments         Comments __________________________________________________________    Practice Providing Exam ______________________________________________    Exam Performed By (print name) _______________________________________      Provider Signature ___________________________________________________      These reports are needed for  compliance. Please fax this completed form and a copy of the Diabetic Foot Exam report to our office located at [address] [place] [address] as soon as possible via Fax [phone] attention Roberto Desir: Phone [phone]    We thank you for your assistance in treating our mutual patient.

## 2023-09-13 NOTE — TELEPHONE ENCOUNTER
Upon review of the In Basket request we were able to locate, review, and update the patient chart as requested for Diabetic Eye Exam.    Any additional questions or concerns should be emailed to the Practice Liaisons via the appropriate education email address, please do not reply via In Basket.     Thank you  Rigo Edwards MA

## 2023-09-13 NOTE — TELEPHONE ENCOUNTER
----- Message from Adolph Forte LPN sent at 4/80/7722  3:54 PM EDT -----  Regarding: care gap request  09/12/23 3:54 PM    Hello, our patient attached above has had Diabetic Eye Exam completed/performed. Please assist in updating the patient chart by making an External outreach to [facility] facility located in [facility] [phone]. The date of service is in the last year. Thank you,  Adolph Forte LPN  [facility] [address], [address] [place] [address]  09/12/23 3:56 PM     Hello, our patient attached above has had Diabetic Foot Exam completed/performed. Please assist in updating the patient chart by making an External outreach to [facility] facility located in [facility]. The date of service is within the last few months.      Thank you,   Adolph Forte LPN    [facility]

## 2023-09-13 NOTE — TELEPHONE ENCOUNTER
Upon review of the In Basket request and the patient's chart, initial outreach has been made via fax to facility. Please see Contacts section for details.      Thank you  Hudson Naik MA

## 2023-09-13 NOTE — LETTER
Diabetic Foot Exam Form    Date Requested: 23  Patient: Africa Randle  Patient : 1940   Referring Provider: Adrian Gillis MD    2nd Request    Diabetic Foot Exam Performed with shoes and socks removed        Yes         No     Date of Diabetic Foot Exam ______________________________  Risk Score ____________________________________________    Left Foot       Visual Inspection         Monofilament Testing Sensory Exam        Pedal Pulses         Additional Comments         Right Foot      Visual Inspection         Monofilament Testing Sensory Exam       Pedal Pulses         Additional Comments         Comments __________________________________________________________    Practice Providing Exam ______________________________________________    Exam Performed By (print name) _______________________________________      Provider Signature ___________________________________________________      These reports are needed for  compliance. Please fax this completed form and a copy of the Diabetic Foot Exam report to our office located at [address] [place] [address] as soon as possible via Fax [phone] ashwini Moore: Phone [phone]    We thank you for your assistance in treating our mutual patient.

## 2023-09-13 NOTE — TELEPHONE ENCOUNTER
Pharmacy called asking if ok for Mobic since he is on Plaxic, increased risk for bleeding .  Please call the pharmacy back to let them know

## 2023-09-14 ENCOUNTER — TELEPHONE (OUTPATIENT)
Dept: FAMILY MEDICINE CLINIC | Facility: CLINIC | Age: 83
End: 2023-09-14

## 2023-09-17 DIAGNOSIS — E34.9 TESTOSTERONE DEFICIENCY: ICD-10-CM

## 2023-09-18 NOTE — TELEPHONE ENCOUNTER
As a follow-up, a second attempt has been made for outreach via fax to facility. Please see Contacts section for details.     Thank you  Chelle Martinez MA

## 2023-09-19 RX ORDER — TESTOSTERONE CYPIONATE 200 MG/ML
INJECTION, SOLUTION INTRAMUSCULAR
Qty: 3 ML | Refills: 0 | Status: SHIPPED | OUTPATIENT
Start: 2023-09-19

## 2023-09-21 NOTE — TELEPHONE ENCOUNTER
As a final attempt, a third outreach has been made via telephone call to facility. Please see Contacts section for details. This encounter will be closed and completed by end of day. Should we receive the requested information because of previous outreach attempts, the requested patient's chart will be updated appropriately.      Thank you  Jacoby Vaughan MA

## 2023-09-24 DIAGNOSIS — M54.50 CHRONIC BILATERAL LOW BACK PAIN WITHOUT SCIATICA: ICD-10-CM

## 2023-09-24 DIAGNOSIS — G89.29 CHRONIC BILATERAL LOW BACK PAIN WITHOUT SCIATICA: ICD-10-CM

## 2023-09-25 RX ORDER — MELOXICAM 7.5 MG/1
7.5 TABLET ORAL DAILY
Qty: 15 TABLET | Refills: 0 | Status: SHIPPED | OUTPATIENT
Start: 2023-09-25

## 2023-10-04 ENCOUNTER — RA CDI HCC (OUTPATIENT)
Dept: OTHER | Facility: HOSPITAL | Age: 83
End: 2023-10-04

## 2023-10-08 DIAGNOSIS — G89.29 CHRONIC BILATERAL LOW BACK PAIN WITHOUT SCIATICA: ICD-10-CM

## 2023-10-08 DIAGNOSIS — M54.50 CHRONIC BILATERAL LOW BACK PAIN WITHOUT SCIATICA: ICD-10-CM

## 2023-10-09 RX ORDER — MELOXICAM 7.5 MG/1
7.5 TABLET ORAL DAILY
Qty: 15 TABLET | Refills: 0 | Status: SHIPPED | OUTPATIENT
Start: 2023-10-09

## 2023-10-10 ENCOUNTER — OFFICE VISIT (OUTPATIENT)
Dept: FAMILY MEDICINE CLINIC | Facility: CLINIC | Age: 83
End: 2023-10-10
Payer: MEDICARE

## 2023-10-10 VITALS
OXYGEN SATURATION: 99 % | TEMPERATURE: 97.9 F | WEIGHT: 208 LBS | HEIGHT: 69 IN | SYSTOLIC BLOOD PRESSURE: 134 MMHG | BODY MASS INDEX: 30.81 KG/M2 | DIASTOLIC BLOOD PRESSURE: 80 MMHG | HEART RATE: 81 BPM

## 2023-10-10 DIAGNOSIS — G89.29 CHRONIC BILATERAL LOW BACK PAIN WITHOUT SCIATICA: ICD-10-CM

## 2023-10-10 DIAGNOSIS — E78.2 MIXED HYPERLIPIDEMIA: ICD-10-CM

## 2023-10-10 DIAGNOSIS — I77.9 PERIPHERAL ARTERIAL OCCLUSIVE DISEASE (HCC): ICD-10-CM

## 2023-10-10 DIAGNOSIS — F17.200 TOBACCO DEPENDENCE SYNDROME: ICD-10-CM

## 2023-10-10 DIAGNOSIS — I25.10 ATHEROSCLEROSIS OF NATIVE CORONARY ARTERY OF NATIVE HEART WITHOUT ANGINA PECTORIS: ICD-10-CM

## 2023-10-10 DIAGNOSIS — M54.50 CHRONIC BILATERAL LOW BACK PAIN WITHOUT SCIATICA: ICD-10-CM

## 2023-10-10 DIAGNOSIS — I10 HTN (HYPERTENSION), BENIGN: Primary | ICD-10-CM

## 2023-10-10 DIAGNOSIS — E11.51 TYPE 2 DIABETES MELLITUS WITH DIABETIC PERIPHERAL ANGIOPATHY WITHOUT GANGRENE, WITHOUT LONG-TERM CURRENT USE OF INSULIN (HCC): ICD-10-CM

## 2023-10-10 PROCEDURE — 99214 OFFICE O/P EST MOD 30 MIN: CPT

## 2023-10-10 NOTE — PROGRESS NOTES
Assessment/Plan:         Problem List Items Addressed This Visit        Endocrine    Type 2 diabetes mellitus with diabetic peripheral angiopathy without gangrene, without long-term current use of insulin ([address])       Lab Results   Component Value Date    HGBA1C 6.9 (H) 06/29/2023   Under control. Continue current medication. We will re-evaluate at next office visit. Relevant Orders    Albumin / creatinine urine ratio    Basic metabolic panel    Hemoglobin A1C       Cardiovascular and Mediastinum    HTN (hypertension), benign - Primary     Under control. Continue current medication. We will re-evaluate at next office visit. Atherosclerosis of coronary artery without angina pectoris    Peripheral arterial occlusive disease ([address])     Symptoms under control. Continue all current care. Continue follow-up with vascular surgery. Other    Mixed hyperlipidemia     Under control. Continue current medication. We will re-evaluate at next office visit. Tobacco dependence syndrome     Quit smoking  Options for nicotine patch, nicotine gum or any medication discussed with patient  Potential consequences of smoking discussed with patient  Patient seems to be understood well           Chronic bilateral low back pain without sciatica     Under control. Continue current medication. We will re-evaluate at next office visit. Subjective:      Patient ID: Hernandez Harrington is a 82 y.o. male. Patient here for review of chronic medical problems and  the labs and imaging if it is applicable. Currently has no specific complaints other than mentioned in the review of systems  Denies chest pain, SOB, cough, abdominal pain, nausea, vomiting, fever, chills, lightheadedness, dizziness,headache, tingling or numbness. No bowel or bladder problem.         The following portions of the patient's history were reviewed and updated as appropriate:   Past Medical History:  He has a past medical history of Bladder polyps, Colon polyp, Coronary artery disease, Diabetes mellitus ([address]), DJD (degenerative joint disease), Ear problems, HL (hearing loss), Hyperlipidemia, Hypertension, Kidney stone, Myocardial infarction ([address]), Obesity, Osteoporosis, and Tinnitus. ,  _______________________________________________________________________  Medical Problems:  does not have any pertinent problems on file.,  _______________________________________________________________________  Past Surgical History:   has a past surgical history that includes Myringotomy w/ tubes; Tonsillectomy; Cholecystectomy; Cataract extraction (11/15/2020); Colonoscopy; Coronary angioplasty with stent; pr cysto/uretero w/lithotripsy &indwell stent insrt (Right, 04/20/2021); [place] retrograde pyelogram (04/20/2021); [place] tooth extraction; Bladder surgery; Cardiac catheterization; and Cataract extraction (12/03/2020). ,  _______________________________________________________________________  Family History:  family history includes Cancer in his father; Heart disease in his mother.,  _______________________________________________________________________  Social History:   reports that he has been smoking cigarettes. He has a 25.00 pack-year smoking history. He has been exposed to tobacco smoke. He has never used smokeless tobacco. He reports that he does not currently use alcohol. He reports that he does not use drugs. ,  _______________________________________________________________________  Allergies:  is allergic to penicillins. .  _______________________________________________________________________  Current Outpatient Medications   Medication Sig Dispense Refill   • B-D 3CC LUER-KAYLEY SYR 22GX1" 22G X 1" 3 ML MISC USE ONE SYRINGE INTRAMUSCULARLY ONCE WEEKLY AS DIRECTED 100 each 1   • Dulaglutide 1.5 MG/0.5ML SOPN once a week     • finasteride (PROSCAR) 5 mg tablet Daily     • furosemide (LASIX) 20 mg tablet TAKE ONE TABLET BY MOUTH EVERY DAY 90 tablet 0   • glipiZIDE (GLUCOTROL XL) 5 mg 24 hr tablet TAKE ONE TABLET BY MOUTH EVERY DAY 90 tablet 0   • GLUCOSAMINE-CHONDROITIN PO Take by mouth in the morning     • losartan (COZAAR) 50 mg tablet TAKE ONE TABLET BY MOUTH EVERY DAY 90 tablet 0   • meloxicam (MOBIC) 7.5 mg tablet TAKE ONE TABLET BY MOUTH EVERY DAY 15 tablet 0   • metFORMIN (GLUCOPHAGE) 1000 MG tablet TAKE ONE TABLET BY MOUTH TWICE A DAY WITH MEALS 180 tablet 0   • metoprolol succinate (TOPROL-XL) 50 mg 24 hr tablet TAKE ONE TABLET BY MOUTH EVERY DAY 90 tablet 0   • potassium chloride (K-DUR,KLOR-CON) 20 mEq tablet TAKE ONE TABLET BY MOUTH EVERY DAY 90 tablet 0   • rosuvastatin (CRESTOR) 10 MG tablet TAKE ONE TABLET BY MOUTH EVERY DAY AT BEDTIME 90 tablet 0   • tamsulosin (FLOMAX) 0.4 mg TAKE ONE CAPSULE BY MOUTH EVERY DAY 90 capsule 0   • testosterone cypionate (DEPO-TESTOSTERONE) 200 mg/mL SOLN INJECT 0.25ML INTRAMUSCULARLY ONCE WEEKLY AS DIRECTED 3 mL 0   • clopidogrel (PLAVIX) 75 mg tablet TAKE ONE TABLET BY MOUTH EVERY DAY (Patient not taking: Reported on 10/10/2023) 90 tablet 0     No current facility-administered medications for this visit.     _______________________________________________________________________  Review of Systems   Constitutional: Negative for chills, fatigue and fever. HENT: Positive for ear pain (Occasional right ear discomfort). Negative for congestion, rhinorrhea, sneezing and sore throat. Eyes: Negative for redness, itching and visual disturbance. Respiratory: Negative for cough, chest tightness and shortness of breath. Cardiovascular: Negative for chest pain, palpitations and leg swelling. Gastrointestinal: Negative for abdominal pain, blood in stool, diarrhea, nausea and vomiting. Endocrine: Negative for cold intolerance and heat intolerance. Genitourinary: Negative for dysuria, frequency and urgency.    Musculoskeletal: Positive for arthralgias (bilateral leg pain) and back pain (middle of lower back). Negative for myalgias. Skin: Negative for color change and rash. Neurological: Negative for dizziness, weakness, light-headedness, numbness and headaches. Hematological: Does not bruise/bleed easily. Psychiatric/Behavioral: Negative for agitation, behavioral problems and confusion. Objective:  Vitals:    10/10/23 0901   BP: 134/80   BP Location: Left arm   Patient Position: Sitting   Cuff Size: Adult   Pulse: 81   Temp: 97.9 °F (36.6 °C)   TempSrc: Tympanic   SpO2: 99%   Weight: 94.3 kg (208 lb)   Height: 5' 9" (1.753 m)     Body mass index is 30.72 kg/m². Physical Exam  Vitals and nursing note reviewed. Constitutional:       General: He is not in acute distress. Appearance: Normal appearance. He is obese. He is not ill-appearing, toxic-appearing or diaphoretic. HENT:      Head: Normocephalic and atraumatic. Right Ear: Tympanic membrane normal.      Left Ear: Tympanic membrane normal.      Nose: Nose normal. No congestion. Mouth/Throat:      Mouth: Mucous membranes are moist.   Eyes:      General: No scleral icterus. Right eye: No discharge. Left eye: No discharge. Extraocular Movements: Extraocular movements intact. Conjunctiva/sclera: Conjunctivae normal.      Pupils: Pupils are equal, round, and reactive to light. Cardiovascular:      Rate and Rhythm: Normal rate and regular rhythm. Pulses: Normal pulses. no weak pulses          Dorsalis pedis pulses are 2+ on the right side and 2+ on the left side. Posterior tibial pulses are 2+ on the right side and 2+ on the left side. Heart sounds: Normal heart sounds. No murmur heard. No gallop. Pulmonary:      Effort: Pulmonary effort is normal. No respiratory distress. Breath sounds: Normal breath sounds. No wheezing, rhonchi or rales. Abdominal:      General: Abdomen is flat. Bowel sounds are normal. There is no distension.       Palpations: Abdomen is soft. Tenderness: There is no abdominal tenderness. There is no guarding. Musculoskeletal:         General: No swelling or tenderness. Normal range of motion. Cervical back: Normal range of motion and neck supple. No rigidity. Right lower leg: No edema. Left lower leg: No edema. Feet:      Right foot:      Skin integrity: Dry skin present. No ulcer, skin breakdown, erythema, warmth or callus. Left foot:      Skin integrity: Dry skin present. No ulcer, skin breakdown, erythema, warmth or callus. Lymphadenopathy:      Cervical: No cervical adenopathy. Skin:     General: Skin is warm. Capillary Refill: Capillary refill takes 2 to 3 seconds. Coloration: Skin is not jaundiced. Findings: No bruising or rash. Neurological:      General: No focal deficit present. Mental Status: He is alert and oriented to person, place, and time. Mental status is at baseline. Gait: Gait normal.   Psychiatric:         Mood and Affect: Mood normal.         Behavior: Behavior normal.       Diabetic Foot Exam    Patient's shoes and socks removed. Right Foot/Ankle   Right Foot Inspection  Skin Exam: skin intact and dry skin. No warmth, no callus, no erythema, no maceration, no abnormal color, no pre-ulcer, no ulcer and no callus. Toe Exam: No swelling, no tenderness, erythema and  no right toe deformity    Sensory   Vibration: intact  Proprioception: intact  Monofilament testing: intact    Vascular  Capillary refills: < 3 seconds  The right DP pulse is 2+. The right PT pulse is 2+. Left Foot/Ankle  Left Foot Inspection  Skin Exam: skin intact and dry skin. No warmth, no erythema, no maceration, normal color, no pre-ulcer, no ulcer and no callus. Toe Exam: No swelling, no tenderness, no erythema and no left toe deformity.      Sensory   Vibration: intact  Proprioception: intact  Monofilament testing: intact    Vascular  Capillary refills: < 3 seconds  The left DP pulse is 2+. The left PT pulse is 2+.      Assign Risk Category  No deformity present  No loss of protective sensation  No weak pulses  Risk: 0

## 2023-10-22 DIAGNOSIS — M54.50 CHRONIC BILATERAL LOW BACK PAIN WITHOUT SCIATICA: ICD-10-CM

## 2023-10-22 DIAGNOSIS — E11.51 TYPE 2 DIABETES MELLITUS WITH DIABETIC PERIPHERAL ANGIOPATHY WITHOUT GANGRENE, WITHOUT LONG-TERM CURRENT USE OF INSULIN (HCC): ICD-10-CM

## 2023-10-22 DIAGNOSIS — G89.29 CHRONIC BILATERAL LOW BACK PAIN WITHOUT SCIATICA: ICD-10-CM

## 2023-10-23 RX ORDER — GLIPIZIDE 5 MG/1
TABLET, FILM COATED, EXTENDED RELEASE ORAL
Qty: 90 TABLET | Refills: 1 | Status: SHIPPED | OUTPATIENT
Start: 2023-10-23

## 2023-10-24 RX ORDER — MELOXICAM 7.5 MG/1
7.5 TABLET ORAL DAILY
Qty: 15 TABLET | Refills: 0 | Status: SHIPPED | OUTPATIENT
Start: 2023-10-24

## 2023-10-29 DIAGNOSIS — E87.6 HYPOKALEMIA: ICD-10-CM

## 2023-10-29 DIAGNOSIS — E11.51 TYPE 2 DIABETES MELLITUS WITH DIABETIC PERIPHERAL ANGIOPATHY WITHOUT GANGRENE, WITHOUT LONG-TERM CURRENT USE OF INSULIN (HCC): ICD-10-CM

## 2023-10-29 DIAGNOSIS — R60.0 EDEMA OF BOTH LOWER LEGS: ICD-10-CM

## 2023-10-30 RX ORDER — POTASSIUM CHLORIDE 20 MEQ/1
TABLET, EXTENDED RELEASE ORAL
Qty: 90 TABLET | Refills: 0 | Status: SHIPPED | OUTPATIENT
Start: 2023-10-30

## 2023-10-30 RX ORDER — FUROSEMIDE 20 MG/1
TABLET ORAL
Qty: 90 TABLET | Refills: 0 | Status: SHIPPED | OUTPATIENT
Start: 2023-10-30

## 2023-11-05 DIAGNOSIS — M54.50 CHRONIC BILATERAL LOW BACK PAIN WITHOUT SCIATICA: ICD-10-CM

## 2023-11-05 DIAGNOSIS — G89.29 CHRONIC BILATERAL LOW BACK PAIN WITHOUT SCIATICA: ICD-10-CM

## 2023-11-06 RX ORDER — MELOXICAM 7.5 MG/1
7.5 TABLET ORAL DAILY
Qty: 15 TABLET | Refills: 0 | Status: SHIPPED | OUTPATIENT
Start: 2023-11-06

## 2023-11-24 ENCOUNTER — TELEPHONE (OUTPATIENT)
Dept: FAMILY MEDICINE CLINIC | Facility: CLINIC | Age: 83
End: 2023-11-24

## 2023-11-26 DIAGNOSIS — G89.29 CHRONIC BILATERAL LOW BACK PAIN WITHOUT SCIATICA: ICD-10-CM

## 2023-11-26 DIAGNOSIS — I10 BENIGN ESSENTIAL HYPERTENSION: ICD-10-CM

## 2023-11-26 DIAGNOSIS — M54.50 CHRONIC BILATERAL LOW BACK PAIN WITHOUT SCIATICA: ICD-10-CM

## 2023-11-27 RX ORDER — MELOXICAM 7.5 MG/1
7.5 TABLET ORAL DAILY
Qty: 15 TABLET | Refills: 0 | Status: SHIPPED | OUTPATIENT
Start: 2023-11-27

## 2023-11-27 RX ORDER — LOSARTAN POTASSIUM 50 MG/1
TABLET ORAL
Qty: 90 TABLET | Refills: 0 | Status: SHIPPED | OUTPATIENT
Start: 2023-11-27

## 2023-12-10 DIAGNOSIS — G89.29 CHRONIC BILATERAL LOW BACK PAIN WITHOUT SCIATICA: ICD-10-CM

## 2023-12-10 DIAGNOSIS — M54.50 CHRONIC BILATERAL LOW BACK PAIN WITHOUT SCIATICA: ICD-10-CM

## 2023-12-11 RX ORDER — MELOXICAM 7.5 MG/1
7.5 TABLET ORAL DAILY
Qty: 15 TABLET | Refills: 5 | Status: SHIPPED | OUTPATIENT
Start: 2023-12-11

## 2023-12-17 DIAGNOSIS — E34.9 TESTOSTERONE DEFICIENCY: ICD-10-CM

## 2023-12-18 RX ORDER — TESTOSTERONE CYPIONATE 200 MG/ML
INJECTION, SOLUTION INTRAMUSCULAR
Qty: 3 ML | Refills: 0 | Status: SHIPPED | OUTPATIENT
Start: 2023-12-18

## 2023-12-24 DIAGNOSIS — I10 BENIGN ESSENTIAL HYPERTENSION: ICD-10-CM

## 2023-12-26 RX ORDER — METOPROLOL SUCCINATE 50 MG/1
TABLET, EXTENDED RELEASE ORAL
Qty: 90 TABLET | Refills: 1 | Status: SHIPPED | OUTPATIENT
Start: 2023-12-26

## 2023-12-29 ENCOUNTER — TELEPHONE (OUTPATIENT)
Age: 83
End: 2023-12-29

## 2023-12-29 NOTE — TELEPHONE ENCOUNTER
Caller requesting a PT script for the patient to continue care at there facility  Fax to [phone]  Patient was going to [facility] but they closed   Patient is at their office now

## 2024-01-03 ENCOUNTER — TELEPHONE (OUTPATIENT)
Dept: FAMILY MEDICINE CLINIC | Facility: CLINIC | Age: 84
End: 2024-01-03

## 2024-01-03 NOTE — TELEPHONE ENCOUNTER
[facility] called asking a referral for P. Spoke to office clerical Suzanna and asked the PT referral faxed over.

## 2024-01-03 NOTE — TELEPHONE ENCOUNTER
Shaina from the access center called that she received a call from [facility] needing a script for therapy for the patient that was there. Gave me a fax number of [phone].

## 2024-01-28 DIAGNOSIS — R60.0 EDEMA OF BOTH LOWER LEGS: ICD-10-CM

## 2024-01-29 RX ORDER — FUROSEMIDE 20 MG/1
TABLET ORAL
Qty: 90 TABLET | Refills: 0 | Status: SHIPPED | OUTPATIENT
Start: 2024-01-29

## 2024-02-04 DIAGNOSIS — E11.51 TYPE 2 DIABETES MELLITUS WITH DIABETIC PERIPHERAL ANGIOPATHY WITHOUT GANGRENE, WITHOUT LONG-TERM CURRENT USE OF INSULIN (HCC): ICD-10-CM

## 2024-02-08 ENCOUNTER — OFFICE VISIT (OUTPATIENT)
Dept: FAMILY MEDICINE CLINIC | Facility: CLINIC | Age: 84
End: 2024-02-08
Payer: MEDICARE

## 2024-02-08 ENCOUNTER — NURSE TRIAGE (OUTPATIENT)
Age: 84
End: 2024-02-08

## 2024-02-08 VITALS
TEMPERATURE: 97.2 F | WEIGHT: 210.4 LBS | DIASTOLIC BLOOD PRESSURE: 68 MMHG | RESPIRATION RATE: 16 BRPM | HEART RATE: 82 BPM | HEIGHT: 69 IN | SYSTOLIC BLOOD PRESSURE: 138 MMHG | BODY MASS INDEX: 31.16 KG/M2 | OXYGEN SATURATION: 98 %

## 2024-02-08 DIAGNOSIS — I10 HTN (HYPERTENSION), BENIGN: Primary | ICD-10-CM

## 2024-02-08 DIAGNOSIS — E11.51 TYPE 2 DIABETES MELLITUS WITH DIABETIC PERIPHERAL ANGIOPATHY WITHOUT GANGRENE, WITHOUT LONG-TERM CURRENT USE OF INSULIN (HCC): ICD-10-CM

## 2024-02-08 DIAGNOSIS — I77.9 PERIPHERAL ARTERIAL OCCLUSIVE DISEASE (HCC): ICD-10-CM

## 2024-02-08 PROCEDURE — 99213 OFFICE O/P EST LOW 20 MIN: CPT

## 2024-02-08 RX ORDER — METOPROLOL SUCCINATE 50 MG/1
25 TABLET, EXTENDED RELEASE ORAL DAILY
COMMUNITY

## 2024-02-08 RX ORDER — AMLODIPINE BESYLATE 5 MG/1
5 TABLET ORAL
Qty: 30 TABLET | Refills: 2 | Status: SHIPPED | OUTPATIENT
Start: 2024-02-08

## 2024-02-08 NOTE — ASSESSMENT & PLAN NOTE
Poorly controlled.  Amlodipine with his losartan and metoprolol.  Frequent check of blood pressure.  Goal is to get blood pressure below 130/80.  Will continue to monitor

## 2024-02-08 NOTE — PROGRESS NOTES
Assessment/Plan:         Problem List Items Addressed This Visit     HTN (hypertension), benign - Primary     Poorly controlled.  Amlodipine with his losartan and metoprolol.  Frequent check of blood pressure.  Goal is to get blood pressure below 130/80.  Will continue to monitor         Relevant Medications    metoprolol succinate (TOPROL-XL) 50 mg 24 hr tablet    amLODIPine (NORVASC) 5 mg tablet    Peripheral arterial occlusive disease (HCC)     Under control.    Continue current medication.    Patient has been followed by cardiologist  We will re-evaluate at next office visit.           Type 2 diabetes mellitus with diabetic peripheral angiopathy without gangrene, without long-term current use of insulin (HCC)       Lab Results   Component Value Date    HGBA1C 6.8 (H) 01/22/2024   Under control.    Continue current medication.    We will re-evaluate at next office visit.                Subjective:      Patient ID: Refugio Gonzalez is a 84 y.o. male.    Patient here for sick visit.  Patient is dealing with peripheral vascular disease and has appointment with cardiologist for possible stent placement.  Meanwhile he is also getting physical therapy at [facility] and they checked his blood pressure in the beginning of therapy and at the end of therapy session his blood pressure is high all the way up to 180 systolic almost consistent basis.  Patient does not have any headache.  No lightheadedness or dizziness.  No chest pain or shortness of breath.  Continue to have some ambulation dysfunction and pain in both lower extremity.  No other new problem.        The following portions of the patient's history were reviewed and updated as appropriate:   Past Medical History:  He has a past medical history of Bladder polyps, Colon polyp, Coronary artery disease, Diabetes mellitus (HCC), DJD (degenerative joint disease), Ear problems, HL (hearing loss), Hyperlipidemia, Hypertension, Kidney stone, Myocardial  "infarction (HCC), Obesity, Osteoporosis, and Tinnitus.,  _______________________________________________________________________  Medical Problems:  does not have any pertinent problems on file.,  _______________________________________________________________________  Past Surgical History:   has a past surgical history that includes Myringotomy w/ tubes; Tonsillectomy; Cholecystectomy; Cataract extraction (11/15/2020); Colonoscopy; Coronary angioplasty with stent; pr cysto/uretero w/lithotripsy &indwell stent insrt (Right, 04/20/2021); [place] retrograde pyelogram (04/20/2021); [place] tooth extraction; Bladder surgery; Cardiac catheterization; and Cataract extraction (12/03/2020).,  _______________________________________________________________________  Family History:  family history includes Cancer in his father; Heart disease in his mother.,  _______________________________________________________________________  Social History:   reports that he has been smoking cigarettes. He has a 25.0 pack-year smoking history. He has been exposed to tobacco smoke. He has never used smokeless tobacco. He reports that he does not currently use alcohol. He reports that he does not use drugs.,  _______________________________________________________________________  Allergies:  is allergic to penicillins..  _______________________________________________________________________  Current Outpatient Medications   Medication Sig Dispense Refill   • amLODIPine (NORVASC) 5 mg tablet Take 1 tablet (5 mg total) by mouth daily at bedtime 30 tablet 2   • aspirin (ECOTRIN LOW STRENGTH) 81 mg EC tablet Take 81 mg by mouth 2 (two) times a day     • B-D 3CC LUER-KAYLEY SYR 22GX1\" 22G X 1\" 3 ML MISC USE ONE SYRINGE INTRAMUSCULARLY ONCE WEEKLY AS DIRECTED 100 each 1   • Dulaglutide 1.5 MG/0.5ML SOPN once a week     • finasteride (PROSCAR) 5 mg tablet Daily     • furosemide (LASIX) 20 mg tablet TAKE ONE TABLET BY MOUTH EVERY DAY 90 tablet 0   • " glipiZIDE (GLUCOTROL XL) 5 mg 24 hr tablet TAKE ONE TABLET BY MOUTH EVERY DAY 90 tablet 1   • GLUCOSAMINE-CHONDROITIN PO Take by mouth in the morning     • losartan (COZAAR) 50 mg tablet TAKE ONE TABLET BY MOUTH EVERY DAY 90 tablet 0   • meloxicam (MOBIC) 7.5 mg tablet TAKE ONE TABLET BY MOUTH EVERY DAY 15 tablet 5   • metFORMIN (GLUCOPHAGE) 1000 MG tablet TAKE ONE TABLET BY MOUTH TWICE A DAY WITH MEALS 180 tablet 0   • metoprolol succinate (TOPROL-XL) 50 mg 24 hr tablet Take 25 mg by mouth daily     • potassium chloride (K-DUR,KLOR-CON) 20 mEq tablet TAKE ONE TABLET BY MOUTH EVERY DAY 90 tablet 0   • rosuvastatin (CRESTOR) 10 MG tablet TAKE ONE TABLET BY MOUTH EVERY DAY AT BEDTIME 90 tablet 0   • tamsulosin (FLOMAX) 0.4 mg TAKE ONE CAPSULE BY MOUTH EVERY DAY 90 capsule 0   • testosterone cypionate (DEPO-TESTOSTERONE) 200 mg/mL SOLN INJECT 0.25 ML INTRAMUSCULARLY ONCE WEEKLY AS DIRECTED 3 mL 0     No current facility-administered medications for this visit.     _______________________________________________________________________  Review of Systems   Constitutional:  Negative for chills, fatigue and fever.   HENT:  Negative for congestion, ear pain (Occasional right ear discomfort), rhinorrhea, sneezing and sore throat.    Eyes:  Negative for redness, itching and visual disturbance.   Respiratory:  Negative for cough, chest tightness and shortness of breath.    Cardiovascular:  Negative for chest pain, palpitations and leg swelling.   Gastrointestinal:  Negative for abdominal pain, blood in stool, diarrhea, nausea and vomiting.   Endocrine: Negative for cold intolerance and heat intolerance.   Genitourinary:  Negative for dysuria, frequency and urgency.   Musculoskeletal:  Positive for arthralgias (bilateral leg pain), back pain (middle of lower back) and myalgias (Both legs).   Skin:  Negative for color change and rash.   Neurological:  Negative for dizziness, weakness, light-headedness, numbness and headaches.  "  Hematological:  Does not bruise/bleed easily.   Psychiatric/Behavioral:  Negative for agitation, behavioral problems and confusion.          Objective:  Vitals:    02/08/24 1503   BP: 138/68   BP Location: Left arm   Patient Position: Sitting   Cuff Size: Large   Pulse: 82   Resp: 16   Temp: (!) 97.2 °F (36.2 °C)   TempSrc: Tympanic   SpO2: 98%   Weight: 95.4 kg (210 lb 6.4 oz)   Height: 5' 9\" (1.753 m)     Body mass index is 31.07 kg/m².     Physical Exam  Vitals and nursing note reviewed.   Constitutional:       General: He is not in acute distress.     Appearance: Normal appearance. He is obese. He is not ill-appearing, toxic-appearing or diaphoretic.   HENT:      Head: Normocephalic and atraumatic.      Right Ear: Tympanic membrane normal.      Left Ear: Tympanic membrane normal.      Nose: Nose normal. No congestion.      Mouth/Throat:      Mouth: Mucous membranes are moist.   Eyes:      General: No scleral icterus.        Right eye: No discharge.         Left eye: No discharge.      Extraocular Movements: Extraocular movements intact.      Conjunctiva/sclera: Conjunctivae normal.      Pupils: Pupils are equal, round, and reactive to light.   Cardiovascular:      Rate and Rhythm: Normal rate and regular rhythm.      Pulses: Normal pulses.      Heart sounds: Normal heart sounds. No murmur heard.     No gallop.   Pulmonary:      Effort: Pulmonary effort is normal. No respiratory distress.      Breath sounds: Normal breath sounds. No wheezing, rhonchi or rales.   Abdominal:      General: Abdomen is flat. Bowel sounds are normal. There is no distension.      Palpations: Abdomen is soft.      Tenderness: There is no abdominal tenderness. There is no guarding.   Musculoskeletal:         General: No swelling or tenderness. Normal range of motion.      Cervical back: Normal range of motion and neck supple. No rigidity.      Right lower leg: No edema.      Left lower leg: No edema.   Lymphadenopathy:      Cervical: No " cervical adenopathy.   Skin:     General: Skin is warm.      Capillary Refill: Capillary refill takes 2 to 3 seconds.      Coloration: Skin is not jaundiced.      Findings: No bruising or rash.   Neurological:      General: No focal deficit present.      Mental Status: He is alert and oriented to person, place, and time. Mental status is at baseline.      Gait: Gait normal.   Psychiatric:         Mood and Affect: Mood normal.         Behavior: Behavior normal.

## 2024-02-08 NOTE — TELEPHONE ENCOUNTER
"Patient goes to [facility] and they check his BP daily. Readings have been high from one month 180/92.  Patient does not have a cuff  at home to check BP.   Denies headache, blurred vision, chest pain , SOB, dizziness, headache. Patient takes Losartan 50 mg OD and Torprol 50 mg OD and denies missing any doses.  Appointment scheduled for today at 3pm.          Reason for Disposition   Systolic BP >= 180 OR Diastolic >= 110    Answer Assessment - Initial Assessment Questions  1. BLOOD PRESSURE: \"What is the blood pressure?\" \"Did you take at least two measurements 5 minutes apart?\"        No cuff at home patient BP checked at rehab daily and high today it was 180/92      2. ONSET: \"When did you take your blood pressure?\"         One month ago    3. HOW: \"How did you obtain the blood pressure?\" (e.g., visiting nurse, automatic home BP monitor)          At  Rehab     4. HISTORY: \"Do you have a history of high blood pressure?\"            5. MEDICATIONS: \"Are you taking any medications for blood pressure?\" \"Have you missed any doses recently?\"          Denies missing doses Losartan &  Toprol    6. OTHER SYMPTOMS: \"Do you have any symptoms?\" (e.g., headache, chest pain, blurred vision, difficulty breathing, weakness)       Denies    Protocols used: Blood Pressure - High-ADULT-OH    "

## 2024-02-08 NOTE — ASSESSMENT & PLAN NOTE
Lab Results   Component Value Date    HGBA1C 6.8 (H) 01/22/2024   Under control.    Continue current medication.    We will re-evaluate at next office visit.

## 2024-02-25 DIAGNOSIS — I10 BENIGN ESSENTIAL HYPERTENSION: ICD-10-CM

## 2024-02-25 DIAGNOSIS — E78.2 MIXED HYPERLIPIDEMIA: ICD-10-CM

## 2024-02-26 RX ORDER — ROSUVASTATIN CALCIUM 10 MG/1
TABLET, COATED ORAL
Qty: 90 TABLET | Refills: 1 | Status: SHIPPED | OUTPATIENT
Start: 2024-02-26

## 2024-02-26 RX ORDER — LOSARTAN POTASSIUM 50 MG/1
TABLET ORAL
Qty: 90 TABLET | Refills: 1 | Status: SHIPPED | OUTPATIENT
Start: 2024-02-26

## 2024-02-29 ENCOUNTER — TELEPHONE (OUTPATIENT)
Dept: FAMILY MEDICINE CLINIC | Facility: CLINIC | Age: 84
End: 2024-02-29

## 2024-02-29 RX ORDER — CIPROFLOXACIN 500 MG/1
500 TABLET, FILM COATED ORAL 2 TIMES DAILY
COMMUNITY
Start: 2024-02-29 | End: 2024-03-07

## 2024-02-29 NOTE — TELEPHONE ENCOUNTER
Called and spoke to patient regarding his ED visit for UTI, he is on Cipro and feels better. He refused a f/u appointment but will call office if any problems occur.

## 2024-03-03 DIAGNOSIS — E34.9 TESTOSTERONE DEFICIENCY: ICD-10-CM

## 2024-03-04 RX ORDER — TESTOSTERONE CYPIONATE 200 MG/ML
INJECTION, SOLUTION INTRAMUSCULAR
Qty: 3 ML | Refills: 0 | Status: SHIPPED | OUTPATIENT
Start: 2024-03-04

## 2024-03-10 DIAGNOSIS — M54.50 CHRONIC BILATERAL LOW BACK PAIN WITHOUT SCIATICA: ICD-10-CM

## 2024-03-10 DIAGNOSIS — G89.29 CHRONIC BILATERAL LOW BACK PAIN WITHOUT SCIATICA: ICD-10-CM

## 2024-03-10 RX ORDER — MELOXICAM 7.5 MG/1
7.5 TABLET ORAL DAILY
Qty: 15 TABLET | Refills: 5 | Status: SHIPPED | OUTPATIENT
Start: 2024-03-10

## 2024-03-14 ENCOUNTER — OFFICE VISIT (OUTPATIENT)
Dept: FAMILY MEDICINE CLINIC | Facility: CLINIC | Age: 84
End: 2024-03-14
Payer: MEDICARE

## 2024-03-14 VITALS
HEIGHT: 69 IN | SYSTOLIC BLOOD PRESSURE: 140 MMHG | TEMPERATURE: 97.7 F | DIASTOLIC BLOOD PRESSURE: 80 MMHG | WEIGHT: 211.4 LBS | OXYGEN SATURATION: 98 % | BODY MASS INDEX: 31.31 KG/M2 | RESPIRATION RATE: 18 BRPM | HEART RATE: 81 BPM

## 2024-03-14 DIAGNOSIS — Z12.5 SCREENING FOR PROSTATE CANCER: ICD-10-CM

## 2024-03-14 DIAGNOSIS — Z00.00 MEDICARE ANNUAL WELLNESS VISIT, SUBSEQUENT: ICD-10-CM

## 2024-03-14 DIAGNOSIS — E78.2 MIXED HYPERLIPIDEMIA: ICD-10-CM

## 2024-03-14 DIAGNOSIS — I10 HTN (HYPERTENSION), BENIGN: ICD-10-CM

## 2024-03-14 DIAGNOSIS — E11.51 TYPE 2 DIABETES MELLITUS WITH DIABETIC PERIPHERAL ANGIOPATHY WITHOUT GANGRENE, WITHOUT LONG-TERM CURRENT USE OF INSULIN (HCC): Primary | ICD-10-CM

## 2024-03-14 DIAGNOSIS — I25.10 ATHEROSCLEROSIS OF NATIVE CORONARY ARTERY OF NATIVE HEART WITHOUT ANGINA PECTORIS: ICD-10-CM

## 2024-03-14 DIAGNOSIS — E34.9 TESTOSTERONE DEFICIENCY: ICD-10-CM

## 2024-03-14 DIAGNOSIS — F17.200 TOBACCO DEPENDENCE SYNDROME: ICD-10-CM

## 2024-03-14 DIAGNOSIS — I77.9 PERIPHERAL ARTERIAL OCCLUSIVE DISEASE (HCC): ICD-10-CM

## 2024-03-14 PROCEDURE — 99214 OFFICE O/P EST MOD 30 MIN: CPT

## 2024-03-14 PROCEDURE — G0439 PPPS, SUBSEQ VISIT: HCPCS

## 2024-03-14 RX ORDER — AMLODIPINE BESYLATE 5 MG/1
5 TABLET ORAL 2 TIMES DAILY
Qty: 60 TABLET | Refills: 2 | Status: SHIPPED | OUTPATIENT
Start: 2024-03-14

## 2024-03-14 NOTE — ASSESSMENT & PLAN NOTE
Not under good control.  Increase amlodipine 5 mg twice daily.  Continue metoprolol and losartan.  We will continue to monitor

## 2024-03-14 NOTE — PATIENT INSTRUCTIONS
Medicare Preventive Visit Patient Instructions  Thank you for completing your Welcome to Medicare Visit or Medicare Annual Wellness Visit today. Your next wellness visit will be due in one year (3/15/2025).  The screening/preventive services that you may require over the next 5-10 years are detailed below. Some tests may not apply to you based off risk factors and/or age. Screening tests ordered at today's visit but not completed yet may show as past due. Also, please note that scanned in results may not display below.  Preventive Screenings:  Service Recommendations Previous Testing/Comments   Colorectal Cancer Screening  Colonoscopy    Fecal Occult Blood Test (FOBT)/Fecal Immunochemical Test (FIT)  Fecal DNA/Cologuard Test  Flexible Sigmoidoscopy Age: 45-75 years old   Colonoscopy: every 10 years (May be performed more frequently if at higher risk)  OR  FOBT/FIT: every 1 year  OR  Cologuard: every 3 years  OR  Sigmoidoscopy: every 5 years  Screening may be recommended earlier than age 45 if at higher risk for colorectal cancer. Also, an individualized decision between you and your healthcare provider will decide whether screening between the ages of 76-85 would be appropriate. Colonoscopy: 01/18/2022  FOBT/FIT: Not on file  Cologuard: Not on file  Sigmoidoscopy: Not on file          Prostate Cancer Screening Individualized decision between patient and health care provider in men between ages of 55-69   Medicare will cover every 12 months beginning on the day after your 50th birthday PSA: No results in last 5 years     Screening Not Indicated     Hepatitis C Screening Once for adults born between 1945 and 1965  More frequently in patients at high risk for Hepatitis C Hep C Antibody: Not on file        Diabetes Screening 1-2 times per year if you're at risk for diabetes or have pre-diabetes Fasting glucose: No results in last 5 years (No results in last 5 years)  A1C: 6.8 % (1/22/2024)  Screening Not  Indicated  History Diabetes   Cholesterol Screening Once every 5 years if you don't have a lipid disorder. May order more often based on risk factors. Lipid panel: 11/04/2022  Screening Not Indicated  History Lipid Disorder      Other Preventive Screenings Covered by Medicare:  Abdominal Aortic Aneurysm (AAA) Screening: covered once if your at risk. You're considered to be at risk if you have a family history of AAA or a male between the age of 65-75 who smoking at least 100 cigarettes in your lifetime.  Lung Cancer Screening: covers low dose CT scan once per year if you meet all of the following conditions: (1) Age 55-77; (2) No signs or symptoms of lung cancer; (3) Current smoker or have quit smoking within the last 15 years; (4) You have a tobacco smoking history of at least 20 pack years (packs per day x number of years you smoked); (5) You get a written order from a healthcare provider.  Glaucoma Screening: covered annually if you're considered high risk: (1) You have diabetes OR (2) Family history of glaucoma OR (3)  aged 50 and older OR (4)  American aged 65 and older  Osteoporosis Screening: covered every 2 years if you meet one of the following conditions: (1) Have a vertebral abnormality; (2) On glucocorticoid therapy for more than 3 months; (3) Have primary hyperparathyroidism; (4) On osteoporosis medications and need to assess response to drug therapy.  HIV Screening: covered annually if you're between the age of 15-65. Also covered annually if you are younger than 15 and older than 65 with risk factors for HIV infection. For pregnant patients, it is covered up to 3 times per pregnancy.    Immunizations:  Immunization Recommendations   Influenza Vaccine Annual influenza vaccination during flu season is recommended for all persons aged >= 6 months who do not have contraindications   Pneumococcal Vaccine   * Pneumococcal conjugate vaccine = PCV13 (Prevnar 13), PCV15 (Vaxneuvance),  PCV20 (Prevnar 20)  * Pneumococcal polysaccharide vaccine = PPSV23 (Pneumovax) Adults 19-63 yo with certain risk factors or if 65+ yo  If never received any pneumonia vaccine: recommend Prevnar 20 (PCV20)  Give PCV20 if previously received 1 dose of PCV13 or PPSV23   Hepatitis B Vaccine 3 dose series if at intermediate or high risk (ex: diabetes, end stage renal disease, liver disease)   Respiratory syncytial virus (RSV) Vaccine - COVERED BY MEDICARE PART D  * RSVPreF3 (Arexvy) CDC recommends that adults 60 years of age and older may receive a single dose of RSV vaccine using shared clinical decision-making (SCDM)   Tetanus (Td) Vaccine - COST NOT COVERED BY MEDICARE PART B Following completion of primary series, a booster dose should be given every 10 years to maintain immunity against tetanus. Td may also be given as tetanus wound prophylaxis.   Tdap Vaccine - COST NOT COVERED BY MEDICARE PART B Recommended at least once for all adults. For pregnant patients, recommended with each pregnancy.   Shingles Vaccine (Shingrix) - COST NOT COVERED BY MEDICARE PART B  2 shot series recommended in those 19 years and older who have or will have weakened immune systems or those 50 years and older     Health Maintenance Due:  There are no preventive care reminders to display for this patient.  Immunizations Due:      Topic Date Due   • Pneumococcal Vaccine: 65+ Years (1 of 2 - PCV) Never done   • Influenza Vaccine (1) Never done   • COVID-19 Vaccine (5 - 2023-24 season) 09/01/2023     Advance Directives   What are advance directives?  Advance directives are legal documents that state your wishes and plans for medical care. These plans are made ahead of time in case you lose your ability to make decisions for yourself. Advance directives can apply to any medical decision, such as the treatments you want, and if you want to donate organs.   What are the types of advance directives?  There are many types of advance directives,  and each state has rules about how to use them. You may choose a combination of any of the following:  Living will:  This is a written record of the treatment you want. You can also choose which treatments you do not want, which to limit, and which to stop at a certain time. This includes surgery, medicine, IV fluid, and tube feedings.   Durable power of  for healthcare (DPAHC):  This is a written record that states who you want to make healthcare choices for you when you are unable to make them for yourself. This person, called a proxy, is usually a family member or a friend. You may choose more than 1 proxy.  Do not resuscitate (DNR) order:  A DNR order is used in case your heart stops beating or you stop breathing. It is a request not to have certain forms of treatment, such as CPR. A DNR order may be included in other types of advance directives.  Medical directive:  This covers the care that you want if you are in a coma, near death, or unable to make decisions for yourself. You can list the treatments you want for each condition. Treatment may include pain medicine, surgery, blood transfusions, dialysis, IV or tube feedings, and a ventilator (breathing machine).  Values history:  This document has questions about your views, beliefs, and how you feel and think about life. This information can help others choose the care that you would choose.  Why are advance directives important?  An advance directive helps you control your care. Although spoken wishes may be used, it is better to have your wishes written down. Spoken wishes can be misunderstood, or not followed. Treatments may be given even if you do not want them. An advance directive may make it easier for your family to make difficult choices about your care.   Weight Management   Why it is important to manage your weight:  Being overweight increases your risk of health conditions such as heart disease, high blood pressure, type 2 diabetes, and  certain types of cancer. It can also increase your risk for osteoarthritis, sleep apnea, and other respiratory problems. Aim for a slow, steady weight loss. Even a small amount of weight loss can lower your risk of health problems.  How to lose weight safely:  A safe and healthy way to lose weight is to eat fewer calories and get regular exercise. You can lose up about 1 pound a week by decreasing the number of calories you eat by 500 calories each day.   Healthy meal plan for weight management:  A healthy meal plan includes a variety of foods, contains fewer calories, and helps you stay healthy. A healthy meal plan includes the following:  Eat whole-grain foods more often.  A healthy meal plan should contain fiber. Fiber is the part of grains, fruits, and vegetables that is not broken down by your body. Whole-grain foods are healthy and provide extra fiber in your diet. Some examples of whole-grain foods are whole-wheat breads and pastas, oatmeal, brown rice, and bulgur.  Eat a variety of vegetables every day.  Include dark, leafy greens such as spinach, kale, susanne greens, and mustard greens. Eat yellow and orange vegetables such as carrots, sweet potatoes, and winter squash.   Eat a variety of fruits every day.  Choose fresh or canned fruit (canned in its own juice or light syrup) instead of juice. Fruit juice has very little or no fiber.  Eat low-fat dairy foods.  Drink fat-free (skim) milk or 1% milk. Eat fat-free yogurt and low-fat cottage cheese. Try low-fat cheeses such as mozzarella and other reduced-fat cheeses.  Choose meat and other protein foods that are low in fat.  Choose beans or other legumes such as split peas or lentils. Choose fish, skinless poultry (chicken or turkey), or lean cuts of red meat (beef or pork). Before you cook meat or poultry, cut off any visible fat.   Use less fat and oil.  Try baking foods instead of frying them. Add less fat, such as margarine, sour cream, regular salad  dressing and mayonnaise to foods. Eat fewer high-fat foods. Some examples of high-fat foods include french fries, doughnuts, ice cream, and cakes.  Eat fewer sweets.  Limit foods and drinks that are high in sugar. This includes candy, cookies, regular soda, and sweetened drinks.  Exercise:  Exercise at least 30 minutes per day on most days of the week. Some examples of exercise include walking, biking, dancing, and swimming. You can also fit in more physical activity by taking the stairs instead of the elevator or parking farther away from stores. Ask your healthcare provider about the best exercise plan for you.      © Copyright [employer] 2018 Information is for End User's use only and may not be sold, redistributed or otherwise used for commercial purposes. All illustrations and images included in CareNotes® are the copyrighted property of [employer] or [employer]

## 2024-03-14 NOTE — PROGRESS NOTES
Assessment and Plan:     Problem List Items Addressed This Visit     HTN (hypertension), benign     Not under good control.  Increase amlodipine 5 mg twice daily.  Continue metoprolol and losartan.  We will continue to monitor         Relevant Medications    amLODIPine (NORVASC) 5 mg tablet    Other Relevant Orders    Comprehensive metabolic panel    CBC and differential    Atherosclerosis of coronary artery without angina pectoris     Under control.    Continue current medication.    Patient has been followed by cardiologist  We will re-evaluate at next office visit.           Relevant Medications    amLODIPine (NORVASC) 5 mg tablet    Mixed hyperlipidemia     Under control.    Continue rosuvastatin  We will re-evaluate at next office visit.           Relevant Orders    Lipid Panel with Direct LDL reflex    Peripheral arterial occlusive disease (HCC)     Under control.    Continue current medication.    Patient has been followed by cardiologist  We will re-evaluate at next office visit.           Tobacco dependence syndrome     Quit smoking  Options for nicotine patch, nicotine gum or any medication discussed with patient  Potential consequences of smoking discussed with patient  Patient seems to be understood well           Testosterone deficiency     Under control.    Continue current medication.    We will re-evaluate at next office visit.           Relevant Orders    Testosterone, Free+Total LC/MS    Type 2 diabetes mellitus with diabetic peripheral angiopathy without gangrene, without long-term current use of insulin (HCC) - Primary       Lab Results   Component Value Date    HGBA1C 6.8 (H) 01/22/2024   Under control.    Continue current medication.    We will re-evaluate at next office visit.           Relevant Orders    Albumin / creatinine urine ratio    Comprehensive metabolic panel    Hemoglobin A1C    CBC and differential   Other Visit Diagnoses     Screening for prostate cancer        Relevant Orders     PSA, Total Screen    Medicare annual wellness visit, subsequent              Depression Screening and Follow-up Plan: Patient was screened for depression during today's encounter. They screened negative with a PHQ-2 score of 0.      Preventive health issues were discussed with patient, and age appropriate screening tests were ordered as noted in patient's After Visit Summary.  Personalized health advice and appropriate referrals for health education or preventive services given if needed, as noted in patient's After Visit Summary.     History of Present Illness:     Patient presents for a Medicare Wellness Visit    Patient here for review of chronic medical problems and  the labs and imaging if it is applicable.  Currently has no specific complaints other than mentioned in the review of systems  Denies chest pain, SOB, cough, abdominal pain, nausea, vomiting, fever, chills, lightheadedness, dizziness,headache, tingling or numbness.No bowel or bladder problem.  Recently had a right lower extremity angioplasty with 3 stents placed in       Patient Care Team:  Loco Mejia MD as PCP - General (Internal Medicine)     Review of Systems:     Review of Systems   Constitutional:  Negative for chills, fatigue and fever.   HENT:  Negative for congestion, ear pain (Occasional right ear discomfort), rhinorrhea, sneezing and sore throat.    Eyes:  Negative for redness, itching and visual disturbance.   Respiratory:  Negative for cough, chest tightness and shortness of breath.    Cardiovascular:  Negative for chest pain, palpitations and leg swelling.   Gastrointestinal:  Negative for abdominal pain, blood in stool, diarrhea, nausea and vomiting.   Endocrine: Negative for cold intolerance and heat intolerance.   Genitourinary:  Negative for dysuria, frequency and urgency.   Musculoskeletal:  Positive for arthralgias (bilateral leg pain), back pain (middle of lower back) and myalgias (Both legs).   Skin:  Negative for color  change and rash.   Neurological:  Negative for dizziness, weakness, light-headedness, numbness and headaches.   Hematological:  Does not bruise/bleed easily.   Psychiatric/Behavioral:  Negative for agitation, behavioral problems and confusion.         Problem List:     Patient Active Problem List   Diagnosis   • HTN (hypertension), benign   • Calculus, kidney   • HL (hearing loss)   • Colon polyp   • Bladder polyps   • Atherosclerosis of coronary artery without angina pectoris   • Hypogonadism   • Mixed hyperlipidemia   • Peripheral arterial occlusive disease (HCC)   • Spinal stenosis of lumbar region   • Tobacco dependence syndrome   • History of myocardial infarction   • Testosterone deficiency   • Type 2 diabetes mellitus with diabetic peripheral angiopathy without gangrene, without long-term current use of insulin (HCC)   • Chronic bilateral low back pain without sciatica   • Unsteady gait      Past Medical and Surgical History:     Past Medical History:   Diagnosis Date   • Bladder polyps     history of   • Colon polyp    • Coronary artery disease    • Diabetes mellitus (HCC)    • DJD (degenerative joint disease)    • Ear problems    • HL (hearing loss)    • Hyperlipidemia    • Hypertension    • Kidney stone    • Myocardial infarction (HCC)    • Obesity    • Osteoporosis    • Tinnitus      Past Surgical History:   Procedure Laterality Date   • BLADDER SURGERY     • CARDIAC CATHETERIZATION     • CATARACT EXTRACTION  11/15/2020   • CATARACT EXTRACTION  12/03/2020   • CHOLECYSTECTOMY     • COLONOSCOPY     • CORONARY ANGIOPLASTY WITH STENT PLACEMENT      x3 last one 2019   • FL RETROGRADE PYELOGRAM  04/20/2021   • MYRINGOTOMY W/ TUBES     • HI CYSTO/URETERO W/LITHOTRIPSY &INDWELL STENT INSRT Right 04/20/2021    Procedure: CYSTOSCOPY RIGHT  URETEROSCOPY WITH LITHOTRIPSY HOLMIUM LASER, AGNIESZKA  RETROGRADE PYELOGRAM AND INSERTION  RIGHT STENT URETERAL;  Surgeon: Josh Medina MD;  Location: [facility];  Service:  Urology   • TONSILLECTOMY     • WISDOM TOOTH EXTRACTION        Family History:     Family History   Problem Relation Age of Onset   • Heart disease Mother    • Cancer Father       Social History:     Social History     Socioeconomic History   • Marital status:      Spouse name: None   • Number of children: None   • Years of education: None   • Highest education level: None   Occupational History   • None   Tobacco Use   • Smoking status: Former     Current packs/day: 0.00     Types: Cigarettes     Quit date: 2024     Years since quittin.0     Passive exposure: Current   • Smokeless tobacco: Never   Vaping Use   • Vaping status: Never Used   Substance and Sexual Activity   • Alcohol use: Not Currently   • Drug use: Never   • Sexual activity: None   Other Topics Concern   • None   Social History Narrative   • None     Social Determinants of Health     Financial Resource Strain: Low Risk  (2022)    Overall Financial Resource Strain (CARDIA)    • Difficulty of Paying Living Expenses: Not very hard   Food Insecurity: Patient Unable To Answer (3/14/2024)    Hunger Vital Sign    • Worried About Running Out of Food in the Last Year: Patient unable to answer    • Ran Out of Food in the Last Year: Patient unable to answer   Transportation Needs: No Transportation Needs (3/14/2024)    PRAPARE - Transportation    • Lack of Transportation (Medical): No    • Lack of Transportation (Non-Medical): No   Physical Activity: Not on file   Stress: Not on file   Social Connections: Not on file   Intimate Partner Violence: Not At Risk (2024)    Received from     Humiliation, Afraid, Rape, and Kick questionnaire    • Fear of Current or Ex-Partner: No    • Emotionally Abused: No    • Physically Abused: No    • Sexually Abused: No   Housing Stability: Low Risk  (3/14/2024)    Housing Stability Vital Sign    • Unable to Pay for Housing in the Last Year: No    • Number of Places Lived in  "the Last Year: 1    • Unstable Housing in the Last Year: No      Medications and Allergies:     Current Outpatient Medications   Medication Sig Dispense Refill   • amLODIPine (NORVASC) 5 mg tablet Take 1 tablet (5 mg total) by mouth 2 (two) times a day 60 tablet 2   • aspirin (ECOTRIN LOW STRENGTH) 81 mg EC tablet Take 81 mg by mouth 2 (two) times a day     • B-D 3CC LUER-KAYLEY SYR 22GX1\" 22G X 1\" 3 ML MISC USE ONE SYRINGE INTRAMUSCULARLY ONCE WEEKLY AS DIRECTED 100 each 1   • Dulaglutide 1.5 MG/0.5ML SOPN once a week     • finasteride (PROSCAR) 5 mg tablet Daily     • furosemide (LASIX) 20 mg tablet TAKE ONE TABLET BY MOUTH EVERY DAY 90 tablet 0   • glipiZIDE (GLUCOTROL XL) 5 mg 24 hr tablet TAKE ONE TABLET BY MOUTH EVERY DAY 90 tablet 1   • GLUCOSAMINE-CHONDROITIN PO Take by mouth in the morning     • losartan (COZAAR) 50 mg tablet TAKE ONE TABLET BY MOUTH EVERY DAY 90 tablet 1   • meloxicam (MOBIC) 7.5 mg tablet TAKE ONE TABLET BY MOUTH EVERY DAY 15 tablet 5   • metFORMIN (GLUCOPHAGE) 1000 MG tablet TAKE ONE TABLET BY MOUTH TWICE A DAY WITH MEALS 180 tablet 0   • metoprolol succinate (TOPROL-XL) 50 mg 24 hr tablet Take 25 mg by mouth daily     • potassium chloride (K-DUR,KLOR-CON) 20 mEq tablet TAKE ONE TABLET BY MOUTH EVERY DAY 90 tablet 0   • rosuvastatin (CRESTOR) 10 MG tablet TAKE ONE TABLET BY MOUTH EVERY DAY AT BEDTIME 90 tablet 1   • tamsulosin (FLOMAX) 0.4 mg TAKE ONE CAPSULE BY MOUTH EVERY DAY 90 capsule 0   • testosterone cypionate (DEPO-TESTOSTERONE) 200 mg/mL SOLN INJECT 0.25ML INTRAMUSCULARLY ONCE WEEKLY AS DIRECTED 3 mL 0     No current facility-administered medications for this visit.     Allergies   Allergen Reactions   • Penicillins Rash     none        Immunizations:     Immunization History   Administered Date(s) Administered   • COVID-19 MODERNA VACC 0.5 ML IM 01/14/2021, 02/11/2021, 11/12/2021, 05/13/2022      Health Maintenance:     There are no preventive care reminders to display for this " patient.      Topic Date Due   • Influenza Vaccine (1) Never done   • COVID-19 Vaccine (5 - 2023-24 season) 09/01/2023      Medicare Screening Tests and Risk Assessments:     Refugio is here for his Subsequent Wellness visit.     Health Risk Assessment:   Patient rates overall health as good. Patient feels that their physical health rating is same. Patient is satisfied with their life. Eyesight was rated as same. Hearing was rated as slightly worse. Patient feels that their emotional and mental health rating is same. Patients states they are never, rarely angry. Patient states they are sometimes unusually tired/fatigued. Pain experienced in the last 7 days has been none. Patient states that he has experienced no weight loss or gain in last 6 months.     Depression Screening:   PHQ-2 Score: 0      Fall Risk Screening:   In the past year, patient has experienced: no history of falling in past year      Home Safety:  Patient does not have trouble with stairs inside or outside of their home. Patient has working smoke alarms and has working carbon monoxide detector. Home safety hazards include: none.     Nutrition:   Current diet is Regular and Diabetic.     Medications:   Patient is currently taking over-the-counter supplements. OTC medications include: see medication list. Patient is able to manage medications.     Activities of Daily Living (ADLs)/Instrumental Activities of Daily Living (IADLs):   Walk and transfer into and out of bed and chair?: Yes  Dress and groom yourself?: Yes    Bathe or shower yourself?: Yes    Feed yourself? Yes  Do your laundry/housekeeping?: Yes  Manage your money, pay your bills and track your expenses?: Yes  Make your own meals?: Yes    Do your own shopping?: Yes    Previous Hospitalizations:   Any hospitalizations or ED visits within the last 12 months?: Yes    How many hospitalizations have you had in the last year?: 1-2    Advance Care Planning:   Living will: No    Durable POA for  "healthcare: No    Advanced directive: No      Cognitive Screening:   Provider or family/friend/caregiver concerned regarding cognition?: No    PREVENTIVE SCREENINGS      Cardiovascular Screening:    General: Screening Not Indicated and History Lipid Disorder      Diabetes Screening:     General: Screening Not Indicated and History Diabetes      Prostate Cancer Screening:    General: Screening Not Indicated      Abdominal Aortic Aneurysm (AAA) Screening:    Risk factors include: tobacco use        Lung Cancer Screening:     General: Screening Not Indicated    Screening, Brief Intervention, and Referral to Treatment (SBIRT)    Screening  Typical number of drinks in a day: 0  Typical number of drinks in a week: 0  Interpretation: Low risk drinking behavior.    AUDIT-C Screenin) How often did you have a drink containing alcohol in the past year? never  2) How many drinks did you have on a typical day when you were drinking in the past year? 0  3) How often did you have 6 or more drinks on one occasion in the past year? never    AUDIT-C Score: 0  Interpretation: Score 0-3 (male): Negative screen for alcohol misuse    Single Item Drug Screening:  How often have you used an illegal drug (including marijuana) or a prescription medication for non-medical reasons in the past year? never    Single Item Drug Screen Score: 0  Interpretation: Negative screen for possible drug use disorder    No results found.     Physical Exam:     /80 (BP Location: Right arm, Patient Position: Sitting, Cuff Size: Standard)   Pulse 81   Temp 97.7 °F (36.5 °C) (Temporal)   Resp 18   Ht 5' 9\" (1.753 m)   Wt 95.9 kg (211 lb 6.4 oz)   SpO2 98%   BMI 31.22 kg/m²     Physical Exam  Vitals and nursing note reviewed.   Constitutional:       General: He is not in acute distress.     Appearance: Normal appearance. He is well-developed and normal weight. He is not ill-appearing, toxic-appearing or diaphoretic.   HENT:      Head: " Normocephalic and atraumatic.      Right Ear: Tympanic membrane, ear canal and external ear normal. There is no impacted cerumen.      Left Ear: Tympanic membrane, ear canal and external ear normal. There is no impacted cerumen.      Nose: Nose normal. No congestion or rhinorrhea.      Mouth/Throat:      Mouth: Mucous membranes are moist.      Pharynx: Oropharynx is clear.   Eyes:      General: No scleral icterus.        Right eye: No discharge.         Left eye: No discharge.      Extraocular Movements: Extraocular movements intact.      Conjunctiva/sclera: Conjunctivae normal.      Pupils: Pupils are equal, round, and reactive to light.   Cardiovascular:      Rate and Rhythm: Normal rate and regular rhythm.      Pulses: Normal pulses.      Heart sounds: Normal heart sounds. No murmur heard.     No gallop.   Pulmonary:      Effort: Pulmonary effort is normal. No respiratory distress.      Breath sounds: Normal breath sounds. No wheezing or rales.   Abdominal:      General: Abdomen is flat. Bowel sounds are normal. There is no distension.      Palpations: Abdomen is soft.      Tenderness: There is no abdominal tenderness. There is no guarding.   Musculoskeletal:         General: No swelling or tenderness. Normal range of motion.      Cervical back: Normal range of motion and neck supple.      Right lower leg: No edema.      Left lower leg: No edema.   Skin:     General: Skin is warm and dry.      Capillary Refill: Capillary refill takes 2 to 3 seconds.      Coloration: Skin is not jaundiced.   Neurological:      General: No focal deficit present.      Mental Status: He is alert and oriented to person, place, and time. Mental status is at baseline.      Sensory: No sensory deficit.      Motor: No weakness.   Psychiatric:         Mood and Affect: Mood normal.         Behavior: Behavior normal.          Loco Mejia MD

## 2024-03-24 DIAGNOSIS — I25.10 ATHEROSCLEROSIS OF NATIVE CORONARY ARTERY OF NATIVE HEART WITHOUT ANGINA PECTORIS: ICD-10-CM

## 2024-03-25 RX ORDER — CLOPIDOGREL BISULFATE 75 MG/1
TABLET ORAL
Qty: 90 TABLET | Refills: 0 | OUTPATIENT
Start: 2024-03-25

## 2024-03-26 RX ORDER — CLOPIDOGREL BISULFATE 75 MG/1
75 TABLET ORAL DAILY
Qty: 90 TABLET | Refills: 3 | Status: SHIPPED | OUTPATIENT
Start: 2024-03-26

## 2024-03-26 NOTE — TELEPHONE ENCOUNTER
Patient called refill line asking about why   clopidogrel (PLAVIX) 75 mg tablet [Pharmacy Med Name: CLOPIDOGREL BISULFATE 75MG TABS] [[id number]]    Order Details  Dose, Route, Frequency: As Directed   Dispense Quantity: 90 tablet Refills: 0          Sig: TAKE ONE TABLET BY MOUTH EVERY DAY         Start Date: 03/25/24 End Date: --   Written Date: -- Expiration Date: --   Ordering Date: 03/25/24     Original Order: clopidogrel (PLAVIX) 75 mg tablet [[id number]]   Providers       Was denied. Please advise on refill

## 2024-04-04 ENCOUNTER — TRANSITIONAL CARE MANAGEMENT (OUTPATIENT)
Dept: FAMILY MEDICINE CLINIC | Facility: CLINIC | Age: 84
End: 2024-04-04

## 2024-04-05 ENCOUNTER — TELEPHONE (OUTPATIENT)
Dept: FAMILY MEDICINE CLINIC | Facility: CLINIC | Age: 84
End: 2024-04-05

## 2024-04-05 NOTE — TELEPHONE ENCOUNTER
Called second time to try and schedule a TCM for patient, left voice mail to return call to office.

## 2024-04-09 ENCOUNTER — TELEPHONE (OUTPATIENT)
Age: 84
End: 2024-04-09

## 2024-04-09 NOTE — TELEPHONE ENCOUNTER
Pt called for his TCM, office was unavailable then the call was dropped, I tried to call the pt back and geoff

## 2024-04-09 NOTE — TELEPHONE ENCOUNTER
Pt called again to make a TCM appt. Call disconected. Tried calling pt again and no answer. Please call pt back.

## 2024-04-10 ENCOUNTER — OFFICE VISIT (OUTPATIENT)
Dept: FAMILY MEDICINE CLINIC | Facility: CLINIC | Age: 84
End: 2024-04-10
Payer: MEDICARE

## 2024-04-10 VITALS
RESPIRATION RATE: 18 BRPM | SYSTOLIC BLOOD PRESSURE: 114 MMHG | HEIGHT: 69 IN | BODY MASS INDEX: 31.25 KG/M2 | WEIGHT: 211 LBS | TEMPERATURE: 98 F | OXYGEN SATURATION: 98 % | HEART RATE: 86 BPM | DIASTOLIC BLOOD PRESSURE: 80 MMHG

## 2024-04-10 DIAGNOSIS — I10 HTN (HYPERTENSION), BENIGN: ICD-10-CM

## 2024-04-10 DIAGNOSIS — E11.51 TYPE 2 DIABETES MELLITUS WITH DIABETIC PERIPHERAL ANGIOPATHY WITHOUT GANGRENE, WITHOUT LONG-TERM CURRENT USE OF INSULIN (HCC): ICD-10-CM

## 2024-04-10 DIAGNOSIS — M54.16 ACUTE RIGHT LUMBAR RADICULOPATHY: Primary | ICD-10-CM

## 2024-04-10 DIAGNOSIS — I25.10 ATHEROSCLEROSIS OF NATIVE CORONARY ARTERY OF NATIVE HEART WITHOUT ANGINA PECTORIS: ICD-10-CM

## 2024-04-10 PROBLEM — F17.200 TOBACCO DEPENDENCE SYNDROME: Status: RESOLVED | Noted: 2022-08-03 | Resolved: 2024-04-10

## 2024-04-10 PROCEDURE — 99495 TRANSJ CARE MGMT MOD F2F 14D: CPT

## 2024-04-10 RX ORDER — METHOCARBAMOL 500 MG/1
500 TABLET, FILM COATED ORAL 3 TIMES DAILY PRN
COMMUNITY
Start: 2024-04-03 | End: 2024-05-03

## 2024-04-10 RX ORDER — GABAPENTIN 100 MG/1
100 CAPSULE ORAL 3 TIMES DAILY
COMMUNITY
Start: 2024-04-03 | End: 2024-05-03

## 2024-04-10 NOTE — ASSESSMENT & PLAN NOTE
Lab Results   Component Value Date    HGBA1C 6.8 (H) 01/22/2024   Under control.  Continue current medication for now.  We will reevaluateat next office visit

## 2024-04-10 NOTE — ASSESSMENT & PLAN NOTE
Pain is improving.  Continue current medication.  Await possible epidural.  We will continue to monitor

## 2024-04-10 NOTE — PROGRESS NOTES
Assessment/Plan:         Problem List Items Addressed This Visit     HTN (hypertension), benign     Not under good control.  Increase amlodipine 5 mg twice daily.  Continue metoprolol and losartan.  We will continue to monitor         Atherosclerosis of coronary artery without angina pectoris     Under control.    Continue current medication.    Patient has been followed by cardiologist  We will re-evaluate at next office visit.           Type 2 diabetes mellitus with diabetic peripheral angiopathy without gangrene, without long-term current use of insulin ([facility])       Lab Results   Component Value Date    HGBA1C 6.8 (H) 01/22/2024   Under control.  Continue current medication for now.  We will reevaluateat next office visit          Acute right lumbar radiculopathy - Primary     Pain is improving.  Continue current medication.  Await possible epidural.  We will continue to monitor              Subjective:      Patient ID: Refugio Gonzalez is a 84 y.o. male.    Patient is here for transitional care management.  Patient was hospitalized on March 31, 2024 and discharged on April 3, 2024 at [facility] with acutely severe right lower extremity pain concern was whether he had arterial blockage because he just had a procedure and stent placed or DVT but final conclusion was acute right lumbar radiculopathy and patient was started on muscle relaxer and gabapentin and has an appointment with physiatrist for possible epidural..  Patient pain level is somewhat better.  Continues to have right lower extremity pain.  No tingling or numbness.  No saddle anesthesia.  No chest pain or shortness of breath.  No fever or chills.  No lightheadedness or dizziness.  No bowel or bladder problem.  No other specific complaint at present time.        The following portions of the patient's history were reviewed and updated as appropriate:   Past Medical History:  He has a past medical history of Bladder polyps, Colon polyp, Coronary  "artery disease, Diabetes mellitus (HCC), DJD (degenerative joint disease), Ear problems, HL (hearing loss), Hyperlipidemia, Hypertension, Kidney stone, Myocardial infarction (HCC), Obesity, Osteoporosis, and Tinnitus.,  _______________________________________________________________________  Medical Problems:  does not have any pertinent problems on file.,  _______________________________________________________________________  Past Surgical History:   has a past surgical history that includes Myringotomy w/ tubes; Tonsillectomy; Cholecystectomy; Cataract extraction (11/15/2020); Colonoscopy; Coronary angioplasty with stent; pr cysto/uretero w/lithotripsy &indwell stent insrt (Right, 04/20/2021); [place] retrograde pyelogram (04/20/2021); [place] tooth extraction; Bladder surgery; Cardiac catheterization; and Cataract extraction (12/03/2020).,  _______________________________________________________________________  Family History:  family history includes Cancer in his father; Heart disease in his mother.,  _______________________________________________________________________  Social History:   reports that he quit smoking about 8 weeks ago. His smoking use included cigarettes. He has been exposed to tobacco smoke. He has never used smokeless tobacco. He reports that he does not currently use alcohol. He reports that he does not use drugs.,  _______________________________________________________________________  Allergies:  is allergic to penicillins..  _______________________________________________________________________  Current Outpatient Medications   Medication Sig Dispense Refill   • acetaminophen (TYLENOL) 500 MG chewable tablet Chew 1,000 mg Three times daily as needed     • amLODIPine (NORVASC) 5 mg tablet Take 1 tablet (5 mg total) by mouth 2 (two) times a day 60 tablet 2   • aspirin (ECOTRIN LOW STRENGTH) 81 mg EC tablet Take 81 mg by mouth 2 (two) times a day     • B-D 3CC LUER-KAYLEY SYR 22GX1\" 22G X 1\" 3 " ML MISC USE ONE SYRINGE INTRAMUSCULARLY ONCE WEEKLY AS DIRECTED 100 each 1   • clopidogrel (PLAVIX) 75 mg tablet Take 1 tablet (75 mg total) by mouth daily 90 tablet 3   • Dulaglutide 1.5 MG/0.5ML SOPN once a week     • finasteride (PROSCAR) 5 mg tablet Daily     • furosemide (LASIX) 20 mg tablet TAKE ONE TABLET BY MOUTH EVERY DAY 90 tablet 0   • gabapentin (NEURONTIN) 100 mg capsule Take 100 mg by mouth Three times a day     • glipiZIDE (GLUCOTROL XL) 5 mg 24 hr tablet TAKE ONE TABLET BY MOUTH EVERY DAY 90 tablet 1   • GLUCOSAMINE-CHONDROITIN PO Take by mouth in the morning     • losartan (COZAAR) 50 mg tablet TAKE ONE TABLET BY MOUTH EVERY DAY 90 tablet 1   • meloxicam (MOBIC) 7.5 mg tablet TAKE ONE TABLET BY MOUTH EVERY DAY 15 tablet 5   • metFORMIN (GLUCOPHAGE) 1000 MG tablet TAKE ONE TABLET BY MOUTH TWICE A DAY WITH MEALS 180 tablet 0   • methocarbamol (ROBAXIN) 500 mg tablet Take 500 mg by mouth Three times daily as needed     • metoprolol succinate (TOPROL-XL) 50 mg 24 hr tablet Take 25 mg by mouth daily     • potassium chloride (K-DUR,KLOR-CON) 20 mEq tablet TAKE ONE TABLET BY MOUTH EVERY DAY 90 tablet 0   • rosuvastatin (CRESTOR) 10 MG tablet TAKE ONE TABLET BY MOUTH EVERY DAY AT BEDTIME 90 tablet 1   • tamsulosin (FLOMAX) 0.4 mg TAKE ONE CAPSULE BY MOUTH EVERY DAY 90 capsule 0   • testosterone cypionate (DEPO-TESTOSTERONE) 200 mg/mL SOLN INJECT 0.25ML INTRAMUSCULARLY ONCE WEEKLY AS DIRECTED 3 mL 0     No current facility-administered medications for this visit.     _______________________________________________________________________  Review of Systems   Constitutional:  Negative for chills, fatigue and fever.   HENT:  Negative for congestion, ear pain (Occasional right ear discomfort), rhinorrhea, sneezing and sore throat.    Eyes:  Negative for redness, itching and visual disturbance.   Respiratory:  Negative for cough, chest tightness and shortness of breath.    Cardiovascular:  Negative for chest  "pain, palpitations and leg swelling.   Gastrointestinal:  Negative for abdominal pain, blood in stool, diarrhea, nausea and vomiting.   Endocrine: Negative for cold intolerance and heat intolerance.   Genitourinary:  Negative for dysuria, frequency and urgency.   Musculoskeletal:  Positive for arthralgias (bilateral leg pain), back pain (middle of lower back) and myalgias (Right leg).   Skin:  Negative for color change and rash.   Neurological:  Negative for dizziness, weakness, light-headedness, numbness and headaches.   Hematological:  Does not bruise/bleed easily.   Psychiatric/Behavioral:  Negative for agitation, behavioral problems and confusion.          Objective:  Vitals:    04/10/24 1100   BP: 114/80   BP Location: Left arm   Patient Position: Sitting   Cuff Size: Standard   Pulse: 86   Resp: 18   Temp: 98 °F (36.7 °C)   TempSrc: Temporal   SpO2: 98%   Weight: 95.7 kg (211 lb)   Height: 5' 9\" (1.753 m)     Body mass index is 31.16 kg/m².     Physical Exam  Vitals and nursing note reviewed.   Constitutional:       General: He is not in acute distress.     Appearance: Normal appearance. He is obese. He is not ill-appearing, toxic-appearing or diaphoretic.   HENT:      Head: Normocephalic and atraumatic.      Right Ear: Tympanic membrane normal.      Left Ear: Tympanic membrane normal.      Nose: Nose normal. No congestion.      Mouth/Throat:      Mouth: Mucous membranes are moist.   Eyes:      General: No scleral icterus.        Right eye: No discharge.         Left eye: No discharge.      Extraocular Movements: Extraocular movements intact.      Conjunctiva/sclera: Conjunctivae normal.      Pupils: Pupils are equal, round, and reactive to light.   Cardiovascular:      Rate and Rhythm: Normal rate and regular rhythm.      Pulses: Normal pulses.      Heart sounds: Normal heart sounds. No murmur heard.     No gallop.   Pulmonary:      Effort: Pulmonary effort is normal. No respiratory distress.      Breath " sounds: Normal breath sounds. No wheezing, rhonchi or rales.   Abdominal:      General: Abdomen is flat. Bowel sounds are normal. There is no distension.      Palpations: Abdomen is soft.      Tenderness: There is no abdominal tenderness. There is no guarding.   Musculoskeletal:         General: No swelling or tenderness. Normal range of motion.      Cervical back: Normal range of motion and neck supple. No rigidity.      Right lower leg: No edema.      Left lower leg: No edema.   Lymphadenopathy:      Cervical: No cervical adenopathy.   Skin:     General: Skin is warm.      Capillary Refill: Capillary refill takes 2 to 3 seconds.      Coloration: Skin is not jaundiced.      Findings: No bruising or rash.   Neurological:      General: No focal deficit present.      Mental Status: He is alert and oriented to person, place, and time. Mental status is at baseline.      Gait: Gait normal.   Psychiatric:         Mood and Affect: Mood normal.         Behavior: Behavior normal.

## 2024-05-05 DIAGNOSIS — E11.51 TYPE 2 DIABETES MELLITUS WITH DIABETIC PERIPHERAL ANGIOPATHY WITHOUT GANGRENE, WITHOUT LONG-TERM CURRENT USE OF INSULIN (HCC): ICD-10-CM

## 2024-05-05 RX ORDER — GLIPIZIDE 5 MG/1
TABLET, FILM COATED, EXTENDED RELEASE ORAL
Qty: 90 TABLET | Refills: 1 | Status: SHIPPED | OUTPATIENT
Start: 2024-05-05

## 2024-05-12 DIAGNOSIS — R60.0 EDEMA OF BOTH LOWER LEGS: ICD-10-CM

## 2024-05-12 DIAGNOSIS — E87.6 HYPOKALEMIA: ICD-10-CM

## 2024-05-12 DIAGNOSIS — E11.51 TYPE 2 DIABETES MELLITUS WITH DIABETIC PERIPHERAL ANGIOPATHY WITHOUT GANGRENE, WITHOUT LONG-TERM CURRENT USE OF INSULIN (HCC): ICD-10-CM

## 2024-05-12 RX ORDER — POTASSIUM CHLORIDE 20 MEQ/1
TABLET, EXTENDED RELEASE ORAL
Qty: 90 TABLET | Refills: 0 | Status: SHIPPED | OUTPATIENT
Start: 2024-05-12

## 2024-05-12 RX ORDER — FUROSEMIDE 20 MG/1
TABLET ORAL
Qty: 90 TABLET | Refills: 0 | Status: SHIPPED | OUTPATIENT
Start: 2024-05-12

## 2024-06-02 DIAGNOSIS — E34.9 TESTOSTERONE DEFICIENCY: ICD-10-CM

## 2024-06-03 RX ORDER — TESTOSTERONE CYPIONATE 200 MG/ML
INJECTION, SOLUTION INTRAMUSCULAR
Qty: 3 ML | Refills: 0 | Status: SHIPPED | OUTPATIENT
Start: 2024-06-03

## 2024-06-17 ENCOUNTER — TELEPHONE (OUTPATIENT)
Age: 84
End: 2024-06-17

## 2024-06-17 NOTE — TELEPHONE ENCOUNTER
Spoke with Nate from [facility] and had lab work fax to [facility] fax. Fax was received and scanned to chart.

## 2024-06-17 NOTE — TELEPHONE ENCOUNTER
Patient called and stated he had labs done last week at [facility] on [facility], He could not remember the exact day.  Patient is scheduled for a 3 month follow up on 6/18.  He would like to know if the lab results can be retrieved in time for his appointment tomorrow.  Please advise patient.  Thank you!

## 2024-06-18 ENCOUNTER — OFFICE VISIT (OUTPATIENT)
Dept: FAMILY MEDICINE CLINIC | Facility: CLINIC | Age: 84
End: 2024-06-18
Payer: MEDICARE

## 2024-06-18 VITALS
RESPIRATION RATE: 16 BRPM | HEIGHT: 69 IN | SYSTOLIC BLOOD PRESSURE: 120 MMHG | WEIGHT: 213.4 LBS | HEART RATE: 89 BPM | BODY MASS INDEX: 31.61 KG/M2 | OXYGEN SATURATION: 96 % | TEMPERATURE: 97.9 F | DIASTOLIC BLOOD PRESSURE: 70 MMHG

## 2024-06-18 DIAGNOSIS — I77.9 PERIPHERAL ARTERIAL OCCLUSIVE DISEASE (HCC): ICD-10-CM

## 2024-06-18 DIAGNOSIS — I25.10 ATHEROSCLEROSIS OF NATIVE CORONARY ARTERY OF NATIVE HEART WITHOUT ANGINA PECTORIS: ICD-10-CM

## 2024-06-18 DIAGNOSIS — E11.51 TYPE 2 DIABETES MELLITUS WITH DIABETIC PERIPHERAL ANGIOPATHY WITHOUT GANGRENE, WITHOUT LONG-TERM CURRENT USE OF INSULIN (HCC): Primary | ICD-10-CM

## 2024-06-18 DIAGNOSIS — M54.50 CHRONIC BILATERAL LOW BACK PAIN WITHOUT SCIATICA: ICD-10-CM

## 2024-06-18 DIAGNOSIS — G89.29 CHRONIC BILATERAL LOW BACK PAIN WITHOUT SCIATICA: ICD-10-CM

## 2024-06-18 DIAGNOSIS — I10 HTN (HYPERTENSION), BENIGN: ICD-10-CM

## 2024-06-18 DIAGNOSIS — E78.2 MIXED HYPERLIPIDEMIA: ICD-10-CM

## 2024-06-18 PROCEDURE — 99214 OFFICE O/P EST MOD 30 MIN: CPT

## 2024-06-18 PROCEDURE — G2211 COMPLEX E/M VISIT ADD ON: HCPCS

## 2024-06-18 RX ORDER — DAPAGLIFLOZIN 10 MG/1
10 TABLET, FILM COATED ORAL DAILY
Qty: 30 TABLET | Refills: 5 | Status: SHIPPED | OUTPATIENT
Start: 2024-06-18 | End: 2024-12-15

## 2024-06-18 NOTE — ASSESSMENT & PLAN NOTE
Lab Results   Component Value Date    HGBA1C 6.8 (H) 01/22/2024   Not under good control last A1c on Jess 10, 2024 is 8.0  And Farxiga 10 mg daily.  Will continue to monitor

## 2024-06-18 NOTE — PROGRESS NOTES
Assessment/Plan:         Problem List Items Addressed This Visit     HTN (hypertension), benign     Not under good control.  Increase amlodipine 5 mg twice daily.  Continue metoprolol and losartan.  We will continue to monitor         Atherosclerosis of coronary artery without angina pectoris     Under control.    Continue current medication.    Patient has been followed by cardiologist  We will re-evaluate at next office visit.           Mixed hyperlipidemia     Under control.    Continue rosuvastatin  We will re-evaluate at next office visit.           Peripheral arterial occlusive disease (HCC)     Under control.    Continue current medication.    Patient has been followed by cardiologist  We will re-evaluate at next office visit.           Type 2 diabetes mellitus with diabetic peripheral angiopathy without gangrene, without long-term current use of insulin (HCC) - Primary       Lab Results   Component Value Date    HGBA1C 6.8 (H) 01/22/2024   Not under good control last A1c on Jess 10, 2024 is 8.0  And Farxiga 10 mg daily.  Will continue to monitor         Relevant Medications    dapagliflozin (Farxiga) 10 MG tablet    Other Relevant Orders    Glucose, fasting    Hemoglobin A1C    Chronic bilateral low back pain without sciatica     Under control.    Continue current medication.    We will re-evaluate at next office visit.                Subjective:      Patient ID: Refugio Gonzalez is a 84 y.o. male.    Patient here for review of chronic medical problems and  the labs and imaging if it is applicable.  Currently has no specific complaints other than mentioned in the review of systems  Denies chest pain, SOB, cough, abdominal pain, nausea, vomiting, fever, chills, lightheadedness, dizziness,headache, tingling or numbness.No bowel or bladder problem.          The following portions of the patient's history were reviewed and updated as appropriate:   Past Medical History:  He has a past medical history of Bladder  "polyps, Colon polyp, Coronary artery disease, Diabetes mellitus (HCC), DJD (degenerative joint disease), Ear problems, HL (hearing loss), Hyperlipidemia, Hypertension, Kidney stone, Myocardial infarction (HCC), Obesity, Osteoporosis, and Tinnitus.,  _______________________________________________________________________  Medical Problems:  does not have any pertinent problems on file.,  _______________________________________________________________________  Past Surgical History:   has a past surgical history that includes Myringotomy w/ tubes; Tonsillectomy; Cholecystectomy; Cataract extraction (11/15/2020); Colonoscopy; Coronary angioplasty with stent; pr cysto/uretero w/lithotripsy &indwell stent insrt (Right, 04/20/2021); [place] retrograde pyelogram (04/20/2021); [place] tooth extraction; Bladder surgery; Cardiac catheterization; and Cataract extraction (12/03/2020).,  _______________________________________________________________________  Family History:  family history includes Cancer in his father; Heart disease in his mother.,  _______________________________________________________________________  Social History:   reports that he quit smoking about 4 months ago. His smoking use included cigarettes. He has been exposed to tobacco smoke. He has never used smokeless tobacco. He reports that he does not currently use alcohol. He reports that he does not use drugs.,  _______________________________________________________________________  Allergies:  is allergic to penicillins..  _______________________________________________________________________  Current Outpatient Medications   Medication Sig Dispense Refill   • amLODIPine (NORVASC) 5 mg tablet Take 1 tablet (5 mg total) by mouth 2 (two) times a day 60 tablet 2   • aspirin (ECOTRIN LOW STRENGTH) 81 mg EC tablet Take 81 mg by mouth 2 (two) times a day     • B-D 3CC LUER-KAYLEY SYR 22GX1\" 22G X 1\" 3 ML MISC USE ONE SYRINGE INTRAMUSCULARLY ONCE WEEKLY AS DIRECTED " 100 each 1   • clopidogrel (PLAVIX) 75 mg tablet Take 1 tablet (75 mg total) by mouth daily 90 tablet 3   • dapagliflozin (Farxiga) 10 MG tablet Take 1 tablet (10 mg total) by mouth daily 30 tablet 5   • Dulaglutide 1.5 MG/0.5ML SOPN once a week     • finasteride (PROSCAR) 5 mg tablet Daily     • furosemide (LASIX) 20 mg tablet TAKE ONE TABLET BY MOUTH EVERY DAY 90 tablet 0   • glipiZIDE (GLUCOTROL XL) 5 mg 24 hr tablet TAKE ONE TABLET BY MOUTH EVERY DAY 90 tablet 1   • GLUCOSAMINE-CHONDROITIN PO Take by mouth in the morning     • losartan (COZAAR) 50 mg tablet TAKE ONE TABLET BY MOUTH EVERY DAY 90 tablet 1   • meloxicam (MOBIC) 7.5 mg tablet TAKE ONE TABLET BY MOUTH EVERY DAY 15 tablet 5   • metFORMIN (GLUCOPHAGE) 1000 MG tablet TAKE ONE TABLET BY MOUTH TWICE A DAY WITH MEALS 180 tablet 0   • metoprolol succinate (TOPROL-XL) 50 mg 24 hr tablet Take 25 mg by mouth daily     • potassium chloride (Klor-Con M20) 20 mEq tablet TAKE ONE TABLET BY MOUTH EVERY DAY 90 tablet 0   • rosuvastatin (CRESTOR) 10 MG tablet TAKE ONE TABLET BY MOUTH EVERY DAY AT BEDTIME 90 tablet 1   • tamsulosin (FLOMAX) 0.4 mg TAKE ONE CAPSULE BY MOUTH EVERY DAY 90 capsule 0   • testosterone cypionate (DEPO-TESTOSTERONE) 200 mg/mL SOLN INJECT 0.25ML INTRAMUSCULARLY ONCE WEEKLY 3 mL 0     No current facility-administered medications for this visit.     _______________________________________________________________________  Review of Systems   Constitutional:  Negative for chills, fatigue and fever.   HENT:  Negative for congestion, ear pain, rhinorrhea, sneezing and sore throat.    Eyes:  Negative for redness, itching and visual disturbance.   Respiratory:  Negative for cough, chest tightness and shortness of breath.    Cardiovascular:  Negative for chest pain, palpitations and leg swelling.   Gastrointestinal:  Negative for abdominal pain, blood in stool, diarrhea, nausea and vomiting.   Endocrine: Negative for cold intolerance and heat  "intolerance.   Genitourinary:  Negative for dysuria, frequency and urgency.   Musculoskeletal:  Positive for back pain (middle of lower back). Negative for arthralgias and myalgias.   Skin:  Negative for color change and rash.   Neurological:  Negative for dizziness, weakness, light-headedness, numbness and headaches.   Hematological:  Does not bruise/bleed easily.   Psychiatric/Behavioral:  Negative for agitation, behavioral problems and confusion.          Objective:  Vitals:    06/18/24 0949   BP: 120/70   BP Location: Left arm   Patient Position: Sitting   Cuff Size: Standard   Pulse: 89   Resp: 16   Temp: 97.9 °F (36.6 °C)   TempSrc: Temporal   SpO2: 96%   Weight: 96.8 kg (213 lb 6.4 oz)   Height: 5' 9\" (1.753 m)     Body mass index is 31.51 kg/m².     Physical Exam  Vitals and nursing note reviewed.   Constitutional:       General: He is not in acute distress.     Appearance: Normal appearance. He is obese. He is not ill-appearing, toxic-appearing or diaphoretic.   HENT:      Head: Normocephalic and atraumatic.      Right Ear: Tympanic membrane normal.      Left Ear: Tympanic membrane normal.      Nose: Nose normal. No congestion.      Mouth/Throat:      Mouth: Mucous membranes are moist.   Eyes:      General: No scleral icterus.        Right eye: No discharge.         Left eye: No discharge.      Extraocular Movements: Extraocular movements intact.      Conjunctiva/sclera: Conjunctivae normal.      Pupils: Pupils are equal, round, and reactive to light.   Cardiovascular:      Rate and Rhythm: Normal rate and regular rhythm.      Pulses: Normal pulses.      Heart sounds: Normal heart sounds. No murmur heard.     No gallop.   Pulmonary:      Effort: Pulmonary effort is normal. No respiratory distress.      Breath sounds: Normal breath sounds. No wheezing, rhonchi or rales.   Abdominal:      General: Abdomen is flat. Bowel sounds are normal. There is no distension.      Palpations: Abdomen is soft.      " Tenderness: There is no abdominal tenderness. There is no guarding.   Musculoskeletal:         General: No swelling or tenderness. Normal range of motion.      Cervical back: Normal range of motion and neck supple. No rigidity.      Right lower leg: No edema.      Left lower leg: No edema.   Lymphadenopathy:      Cervical: No cervical adenopathy.   Skin:     General: Skin is warm.      Capillary Refill: Capillary refill takes 2 to 3 seconds.      Coloration: Skin is not jaundiced.      Findings: No bruising or rash.   Neurological:      General: No focal deficit present.      Mental Status: He is alert and oriented to person, place, and time. Mental status is at baseline.      Gait: Gait normal.   Psychiatric:         Mood and Affect: Mood normal.         Behavior: Behavior normal.

## 2024-06-19 ENCOUNTER — TELEPHONE (OUTPATIENT)
Age: 84
End: 2024-06-19

## 2024-06-19 NOTE — TELEPHONE ENCOUNTER
Spoke to patient and he stated he was accepted in the [employer] [facility] program for [facility]. Script for the medication needs to be faxed to [phone].  Per patient he received conflicting information and one person indicated the the company would request the script and another said to fax the script once he was accepted.

## 2024-06-19 NOTE — TELEPHONE ENCOUNTER
Patient called and is requesting to speak with Rose in the practice directly.  He stated he was seen yesterday and they were working on a prescription for Farxiga.  He has an update that he would like to speak with her about.  Attempted to warm transfer, however, call was disconnected.  Patient is requesting a call back from Rose as soon she as she is available.  Thank you!

## 2024-06-23 DIAGNOSIS — G89.29 CHRONIC BILATERAL LOW BACK PAIN WITHOUT SCIATICA: ICD-10-CM

## 2024-06-23 DIAGNOSIS — M54.50 CHRONIC BILATERAL LOW BACK PAIN WITHOUT SCIATICA: ICD-10-CM

## 2024-06-23 RX ORDER — MELOXICAM 7.5 MG/1
7.5 TABLET ORAL DAILY
Qty: 15 TABLET | Refills: 5 | Status: SHIPPED | OUTPATIENT
Start: 2024-06-23

## 2024-07-03 ENCOUNTER — TELEPHONE (OUTPATIENT)
Age: 84
End: 2024-07-03

## 2024-07-03 NOTE — TELEPHONE ENCOUNTER
Patient called inquiring about his approval with [employer] for Farxiga. He completed an on line application and was approved and is requesting that a script be faxed to this number [phone] and if he could please be called when this is completed.

## 2024-07-05 DIAGNOSIS — E11.51 TYPE 2 DIABETES MELLITUS WITH DIABETIC PERIPHERAL ANGIOPATHY WITHOUT GANGRENE, WITHOUT LONG-TERM CURRENT USE OF INSULIN (HCC): ICD-10-CM

## 2024-07-05 RX ORDER — DAPAGLIFLOZIN 10 MG/1
10 TABLET, FILM COATED ORAL DAILY
Qty: 90 TABLET | Refills: 3 | Status: SHIPPED | OUTPATIENT
Start: 2024-07-05

## 2024-07-05 NOTE — TELEPHONE ENCOUNTER
History  Chief Complaint   Patient presents with    Syncope     States started with covid symptoms 2 days ago, tested positive yesterday  States she was in the shower today and " blacked out ", denies injuries  States " I think I am dehydrated "Ambulatory sat is 80 %     69 yo female c/o scratchy throat, aches, and poor oral intake x 3 days  Took at home covid test and it was positive  She was in the shower today and passed out  Did not hit head, got up on her own  No dizziness  No chest pain  No head or neck pain  No back or belly pain  No fever  No vomiting, diarrhea  She is not vaccinated  History provided by:  Patient   used: No    Syncope  Associated symptoms: no chest pain, no dizziness, no fever, no headaches, no nausea, no shortness of breath and no vomiting        Prior to Admission Medications   Prescriptions Last Dose Informant Patient Reported? Taking? Lutein 40 MG CAPS   Yes No   Sig: Take by mouth   Multiple Vitamins-Minerals (MULTIVITAMIN WITH MINERALS) tablet   Yes No   Sig: Take 1 tablet by mouth daily      Facility-Administered Medications: None       Past Medical History:   Diagnosis Date    Breast cyst        Past Surgical History:   Procedure Laterality Date    BREAST CYST EXCISION Right 1971    2 rt breast cysts removed     SECTION      and [facility]    COLONOSCOPY  2016       Family History   Problem Relation Age of Onset    Heart disease Father     Breast cancer Other         unkown age   Manas Azar No Known Problems Sister     No Known Problems Daughter     No Known Problems Maternal Grandmother     No Known Problems Paternal Grandmother     No Known Problems Maternal Aunt     No Known Problems Maternal Aunt     No Known Problems Paternal Aunt      I have reviewed and agree with the history as documented      E-Cigarette/Vaping    E-Cigarette Use Never User      E-Cigarette/Vaping Substances     Social History Prescription printed   Tobacco Use    Smoking status: Never Smoker    Smokeless tobacco: Never Used   Vaping Use    Vaping Use: Never used   Substance Use Topics    Alcohol use: No    Drug use: No       Review of Systems   Constitutional: Negative  Negative for fever  HENT: Positive for congestion and sore throat  Eyes: Negative  Respiratory: Positive for cough  Negative for shortness of breath  Cardiovascular: Positive for syncope  Negative for chest pain  Gastrointestinal: Negative  Negative for abdominal pain, diarrhea, nausea and vomiting  Genitourinary: Negative  Negative for dysuria and flank pain  Musculoskeletal: Positive for myalgias  Negative for back pain  Skin: Negative  Negative for rash  Neurological: Negative for dizziness, light-headedness and headaches  Hematological: Does not bruise/bleed easily  Psychiatric/Behavioral: Negative  All other systems reviewed and are negative  Physical Exam  Physical Exam  Vitals and nursing note reviewed  Constitutional:       General: She is not in acute distress  Appearance: She is well-developed  She is not ill-appearing, toxic-appearing or diaphoretic  HENT:      Head: Normocephalic and atraumatic  Right Ear: External ear normal       Left Ear: External ear normal    Eyes:      General: No scleral icterus  Conjunctiva/sclera: Conjunctivae normal    Cardiovascular:      Rate and Rhythm: Normal rate and regular rhythm  Heart sounds: Normal heart sounds  No murmur heard  Pulmonary:      Effort: Pulmonary effort is normal  No respiratory distress  Breath sounds: Normal breath sounds  Musculoskeletal:         General: No deformity  Normal range of motion  Cervical back: Normal range of motion and neck supple  Right lower leg: No edema  Left lower leg: No edema  Lymphadenopathy:      Cervical: No cervical adenopathy  Skin:     General: Skin is warm and dry  Coloration: Skin is not pale  Findings: No rash  Neurological:      General: No focal deficit present  Mental Status: She is alert and oriented to person, place, and time  Cranial Nerves: No cranial nerve deficit  Motor: No weakness  Gait: Gait normal    Psychiatric:         Mood and Affect: Mood normal          Behavior: Behavior normal          Vital Signs  ED Triage Vitals   Temperature Pulse Respirations Blood Pressure SpO2   01/04/22 1638 01/04/22 1638 01/04/22 1638 01/04/22 1639 01/04/22 1639   98 °F (36 7 °C) 92 20 151/79 97 %      Temp Source Heart Rate Source Patient Position - Orthostatic VS BP Location FiO2 (%)   01/04/22 1638 01/04/22 1638 01/04/22 1639 01/04/22 1639 --   Tympanic Monitor Sitting Left arm       Pain Score       --                  Vitals:    01/04/22 1638 01/04/22 1639   BP:  151/79   Pulse: 92    Patient Position - Orthostatic VS:  Sitting         Visual Acuity      ED Medications  Medications - No data to display    Diagnostic Studies  Results Reviewed     Procedure Component Value Units Date/Time    COVID/FLU - 24 hour TAT [[id number]]     Lab Status: No result Specimen: Nares from Nose                  No orders to display              Procedures  Procedures         ED Course                                             MDM  Number of Diagnoses or Management Options  COVID  Syncope  Diagnosis management comments: Pt  Looks and feels fine  No concerning symptoms  Will confirm covid with PCR as pt  Could meet MAB criteria  Suspect mild dehydration  Advised rest, oral fluids, isolate, tylenol prn, follow up if any worsening or urgent concerns        Disposition  Final diagnoses:   Syncope   COVID     Time reflects when diagnosis was documented in both MDM as applicable and the Disposition within this note     Time User Action Codes Description Comment    1/4/3483  1:63 PM Rebeca Rene Add [J56] Syncope     2/0/6063  1:16 PM Rebeca Rene Add [B26 6] [facility]       ED Disposition     ED Disposition Condition Date/Time Comment    Discharge Stable Tue Jan 4, 2022  4:48 PM Seema Arcadia Avery discharge to home/self care  Follow-up Information     Follow up With Specialties Details Why Contact Info    Amy Alejandra MD Family Medicine  As needed [place] [id number] [phone]            Patient's Medications   Discharge Prescriptions    No medications on file       No discharge procedures on file      PDMP Review     None          ED Provider  Electronically Signed by           Jesús Lebron MD  26/16/80 [id number]

## 2024-07-08 ENCOUNTER — TELEPHONE (OUTPATIENT)
Age: 84
End: 2024-07-08

## 2024-07-08 NOTE — TELEPHONE ENCOUNTER
2nd attempt: Pt stated he was told he would receive a call bk earlier today and he didn't. Pt requested to speak with Alexa or Suzanna at office in regards to Leixir.       He was told that the fax will be sent today.       However, he wants to share an email message that was sent to him that is very important.         Please have either of the ladies contact pt before end of day today. Thank you for your kind assistance.

## 2024-07-08 NOTE — TELEPHONE ENCOUNTER
Called patient and had to leave a message,didn't see the note until now. Asked him to request to be put through to the office

## 2024-07-29 ENCOUNTER — TELEPHONE (OUTPATIENT)
Age: 84
End: 2024-07-29

## 2024-07-29 NOTE — TELEPHONE ENCOUNTER
Patient called [facility] because he hadn't gotten his Farxiga yet and the person he spoke to informed him they did get the paperwork but because a signed script wasn't sent they didn't fill the order.   Having Dr PHUONG Mejia sign a script and faxing with the paperwork that was sent on Fri .

## 2024-08-06 ENCOUNTER — TELEPHONE (OUTPATIENT)
Age: 84
End: 2024-08-06

## 2024-08-06 NOTE — TELEPHONE ENCOUNTER
Patient calls in today because his Farxiga will be delivered this week.  He would like to know what he should do about his other diabetic medications while taking Farxiga.  He was recently upped to 2 times a week with Trulicity.  Should he stop this?    Please advise.

## 2024-08-06 NOTE — TELEPHONE ENCOUNTER
Called and left voice message for patient regarding frequency of Trulicity, as per PCP take once a week and continue with all other antidiabetic medications. Advised to call office with any additional concerns

## 2024-08-11 DIAGNOSIS — I10 HTN (HYPERTENSION), BENIGN: ICD-10-CM

## 2024-08-11 RX ORDER — AMLODIPINE BESYLATE 5 MG/1
5 TABLET ORAL 2 TIMES DAILY
Qty: 60 TABLET | Refills: 2 | Status: SHIPPED | OUTPATIENT
Start: 2024-08-11

## 2024-08-18 DIAGNOSIS — I10 BENIGN ESSENTIAL HYPERTENSION: ICD-10-CM

## 2024-08-18 DIAGNOSIS — E11.51 TYPE 2 DIABETES MELLITUS WITH DIABETIC PERIPHERAL ANGIOPATHY WITHOUT GANGRENE, WITHOUT LONG-TERM CURRENT USE OF INSULIN (HCC): ICD-10-CM

## 2024-08-18 DIAGNOSIS — R60.0 EDEMA OF BOTH LOWER LEGS: ICD-10-CM

## 2024-08-19 RX ORDER — LOSARTAN POTASSIUM 50 MG/1
TABLET ORAL
Qty: 90 TABLET | Refills: 1 | Status: SHIPPED | OUTPATIENT
Start: 2024-08-19

## 2024-08-19 RX ORDER — FUROSEMIDE 20 MG
TABLET ORAL
Qty: 90 TABLET | Refills: 0 | Status: SHIPPED | OUTPATIENT
Start: 2024-08-19

## 2024-08-25 DIAGNOSIS — E78.2 MIXED HYPERLIPIDEMIA: ICD-10-CM

## 2024-08-26 RX ORDER — ROSUVASTATIN CALCIUM 10 MG/1
TABLET, COATED ORAL
Qty: 30 TABLET | Refills: 0 | Status: SHIPPED | OUTPATIENT
Start: 2024-08-26

## 2024-09-04 ENCOUNTER — OFFICE VISIT (OUTPATIENT)
Age: 84
End: 2024-09-04
Payer: MEDICARE

## 2024-09-04 VITALS
HEIGHT: 69 IN | RESPIRATION RATE: 16 BRPM | DIASTOLIC BLOOD PRESSURE: 64 MMHG | BODY MASS INDEX: 31.55 KG/M2 | WEIGHT: 213 LBS | HEART RATE: 79 BPM | OXYGEN SATURATION: 98 % | SYSTOLIC BLOOD PRESSURE: 120 MMHG | TEMPERATURE: 98.6 F

## 2024-09-04 DIAGNOSIS — I10 HTN (HYPERTENSION), BENIGN: ICD-10-CM

## 2024-09-04 DIAGNOSIS — E11.51 TYPE 2 DIABETES MELLITUS WITH DIABETIC PERIPHERAL ANGIOPATHY WITHOUT GANGRENE, WITHOUT LONG-TERM CURRENT USE OF INSULIN (HCC): Primary | ICD-10-CM

## 2024-09-04 DIAGNOSIS — I25.10 ATHEROSCLEROSIS OF NATIVE CORONARY ARTERY OF NATIVE HEART WITHOUT ANGINA PECTORIS: ICD-10-CM

## 2024-09-04 DIAGNOSIS — M54.50 CHRONIC BILATERAL LOW BACK PAIN WITHOUT SCIATICA: ICD-10-CM

## 2024-09-04 DIAGNOSIS — E78.2 MIXED HYPERLIPIDEMIA: ICD-10-CM

## 2024-09-04 DIAGNOSIS — I77.9 PERIPHERAL ARTERIAL OCCLUSIVE DISEASE (HCC): ICD-10-CM

## 2024-09-04 DIAGNOSIS — G89.29 CHRONIC BILATERAL LOW BACK PAIN WITHOUT SCIATICA: ICD-10-CM

## 2024-09-04 PROCEDURE — G2211 COMPLEX E/M VISIT ADD ON: HCPCS

## 2024-09-04 PROCEDURE — 99214 OFFICE O/P EST MOD 30 MIN: CPT

## 2024-09-04 NOTE — PROGRESS NOTES
Assessment/Plan:         Problem List Items Addressed This Visit     HTN (hypertension), benign    Atherosclerosis of coronary artery without angina pectoris     Under control.    Continue current medication.    Patient has been followed by cardiologist  We will re-evaluate at next office visit.           Mixed hyperlipidemia     Under control.    Continue rosuvastatin  We will re-evaluate at next office visit.           Peripheral arterial occlusive disease (HCC)     Under control.    Continue current medication.    Patient has been followed by cardiologist  We will re-evaluate at next office visit.           Type 2 diabetes mellitus with diabetic peripheral angiopathy without gangrene, without long-term current use of insulin (HCC) - Primary       Lab Results   Component Value Date    HGBA1C 6.8 (H) 01/22/2024   Not under good control last A1c on Jess 10, 2024 is 8.0  And Farxiga 10 mg daily.  Will continue to monitor         Chronic bilateral low back pain without sciatica     Under control.    Continue current medication.    We will re-evaluate at next office visit.                Subjective:      Patient ID: Refugio Gonzalez is a 84 y.o. male.    Patient here for review of chronic medical problems and  the labs and imaging if it is applicable.  Currently has no specific complaints other than mentioned in the review of systems  Denies chest pain, SOB, cough, abdominal pain, nausea, vomiting, fever, chills, lightheadedness, dizziness,headache, tingling or numbness.No bowel or bladder problem.          The following portions of the patient's history were reviewed and updated as appropriate:   Past Medical History:  He has a past medical history of Bladder polyps, Colon polyp, Coronary artery disease, Diabetes mellitus (HCC), DJD (degenerative joint disease), Ear problems, HL (hearing loss), Hyperlipidemia, Hypertension, Kidney stone, Myocardial infarction (HCC), Obesity, Osteoporosis, and  "Tinnitus.,  _______________________________________________________________________  Medical Problems:  does not have any pertinent problems on file.,  _______________________________________________________________________  Past Surgical History:   has a past surgical history that includes Myringotomy w/ tubes; Tonsillectomy; Cholecystectomy; Cataract extraction (11/15/2020); Colonoscopy; Coronary angioplasty with stent; pr cysto/uretero w/lithotripsy &indwell stent insrt (Right, 04/20/2021); [place] retrograde pyelogram (04/20/2021); [place] tooth extraction; Bladder surgery; Cardiac catheterization; and Cataract extraction (12/03/2020).,  _______________________________________________________________________  Family History:  family history includes Cancer in his father; Heart disease in his mother.,  _______________________________________________________________________  Social History:   reports that he quit smoking about 6 months ago. His smoking use included cigarettes. He has been exposed to tobacco smoke. He has never used smokeless tobacco. He reports that he does not currently use alcohol. He reports that he does not use drugs.,  _______________________________________________________________________  Allergies:  is allergic to penicillins..  _______________________________________________________________________  Current Outpatient Medications   Medication Sig Dispense Refill   • amLODIPine (NORVASC) 5 mg tablet TAKE ONE TABLET BY MOUTH TWICE A DAY 60 tablet 2   • aspirin (ECOTRIN LOW STRENGTH) 81 mg EC tablet Take 81 mg by mouth 2 (two) times a day     • B-D 3CC LUER-KAYLEY SYR 22GX1\" 22G X 1\" 3 ML MISC USE ONE SYRINGE INTRAMUSCULARLY ONCE WEEKLY AS DIRECTED 100 each 1   • clopidogrel (PLAVIX) 75 mg tablet Take 1 tablet (75 mg total) by mouth daily 90 tablet 3   • dapagliflozin (Farxiga) 10 MG tablet Take 1 tablet (10 mg total) by mouth daily 90 tablet 3   • Dulaglutide 1.5 MG/0.5ML SOPN once a week     • " finasteride (PROSCAR) 5 mg tablet Daily     • furosemide (LASIX) 20 mg tablet TAKE ONE TABLET BY MOUTH EVERY DAY 90 tablet 0   • glipiZIDE (GLUCOTROL XL) 5 mg 24 hr tablet TAKE ONE TABLET BY MOUTH EVERY DAY 90 tablet 1   • GLUCOSAMINE-CHONDROITIN PO Take by mouth in the morning     • losartan (COZAAR) 50 mg tablet TAKE ONE TABLET BY MOUTH EVERY DAY 90 tablet 1   • meloxicam (MOBIC) 7.5 mg tablet TAKE ONE TABLET BY MOUTH EVERY DAY 15 tablet 5   • metFORMIN (GLUCOPHAGE) 1000 MG tablet TAKE ONE TABLET BY MOUTH TWICE A DAY WITH MEALS 180 tablet 0   • metoprolol succinate (TOPROL-XL) 50 mg 24 hr tablet Take 25 mg by mouth daily     • potassium chloride (Klor-Con M20) 20 mEq tablet TAKE ONE TABLET BY MOUTH EVERY DAY 90 tablet 0   • rosuvastatin (CRESTOR) 10 MG tablet TAKE ONE TABLET BY MOUTH EVERY DAY AT BEDTIME 30 tablet 0   • tamsulosin (FLOMAX) 0.4 mg TAKE ONE CAPSULE BY MOUTH EVERY DAY 90 capsule 0   • testosterone cypionate (DEPO-TESTOSTERONE) 200 mg/mL SOLN INJECT 0.25ML INTRAMUSCULARLY ONCE WEEKLY 3 mL 0     No current facility-administered medications for this visit.     _______________________________________________________________________  Review of Systems   Constitutional:  Negative for chills, fatigue and fever.   HENT:  Negative for congestion, ear pain, rhinorrhea, sneezing and sore throat.    Eyes:  Negative for redness, itching and visual disturbance.   Respiratory:  Negative for cough, chest tightness and shortness of breath.    Cardiovascular:  Negative for chest pain, palpitations and leg swelling.   Gastrointestinal:  Negative for abdominal pain, blood in stool, diarrhea, nausea and vomiting.   Endocrine: Negative for cold intolerance and heat intolerance.   Genitourinary:  Negative for dysuria, frequency and urgency.   Musculoskeletal:  Positive for back pain (middle of lower back). Negative for arthralgias and myalgias.   Skin:  Negative for color change and rash.   Neurological:  Negative for  "dizziness, weakness, light-headedness, numbness and headaches.   Hematological:  Does not bruise/bleed easily.   Psychiatric/Behavioral:  Negative for agitation, behavioral problems and confusion.          Objective:  Vitals:    09/04/24 0813   BP: 120/64   BP Location: Left arm   Patient Position: Sitting   Cuff Size: Standard   Pulse: 79   Resp: 16   Temp: 98.6 °F (37 °C)   TempSrc: Temporal   SpO2: 98%   Weight: 96.6 kg (213 lb)   Height: 5' 9\" (1.753 m)     Body mass index is 31.45 kg/m².     Physical Exam  Vitals and nursing note reviewed.   Constitutional:       General: He is not in acute distress.     Appearance: Normal appearance. He is obese. He is not ill-appearing, toxic-appearing or diaphoretic.   HENT:      Head: Normocephalic and atraumatic.      Nose: Nose normal. No congestion.      Mouth/Throat:      Mouth: Mucous membranes are moist.   Eyes:      General: No scleral icterus.        Right eye: No discharge.         Left eye: No discharge.      Extraocular Movements: Extraocular movements intact.      Conjunctiva/sclera: Conjunctivae normal.      Pupils: Pupils are equal, round, and reactive to light.   Cardiovascular:      Rate and Rhythm: Normal rate and regular rhythm.      Pulses: Normal pulses.      Heart sounds: Normal heart sounds. No murmur heard.     No gallop.   Pulmonary:      Effort: Pulmonary effort is normal. No respiratory distress.      Breath sounds: Normal breath sounds. No wheezing, rhonchi or rales.   Abdominal:      General: Abdomen is flat. Bowel sounds are normal. There is no distension.      Palpations: Abdomen is soft.      Tenderness: There is no abdominal tenderness. There is no right CVA tenderness, left CVA tenderness or guarding.   Musculoskeletal:         General: No swelling or tenderness. Normal range of motion.      Cervical back: Normal range of motion and neck supple. No rigidity.      Right lower leg: No edema.      Left lower leg: No edema.   Lymphadenopathy:    "   Cervical: No cervical adenopathy.   Skin:     General: Skin is warm.      Capillary Refill: Capillary refill takes 2 to 3 seconds.      Coloration: Skin is not jaundiced.      Findings: No bruising or rash.   Neurological:      General: No focal deficit present.      Mental Status: He is alert and oriented to person, place, and time. Mental status is at baseline.      Gait: Gait normal.   Psychiatric:         Mood and Affect: Mood normal.         Behavior: Behavior normal.

## 2024-09-05 ENCOUNTER — RA CDI HCC (OUTPATIENT)
Dept: OTHER | Facility: HOSPITAL | Age: 84
End: 2024-09-05

## 2024-09-15 DIAGNOSIS — E34.9 TESTOSTERONE DEFICIENCY: ICD-10-CM

## 2024-09-15 DIAGNOSIS — R60.0 EDEMA OF BOTH LOWER LEGS: ICD-10-CM

## 2024-09-17 DIAGNOSIS — R60.0 EDEMA OF BOTH LOWER LEGS: ICD-10-CM

## 2024-09-17 RX ORDER — TESTOSTERONE CYPIONATE 200 MG/ML
INJECTION, SOLUTION INTRAMUSCULAR
Qty: 3 ML | Refills: 0 | Status: SHIPPED | OUTPATIENT
Start: 2024-09-17

## 2024-09-17 RX ORDER — FUROSEMIDE 20 MG
TABLET ORAL
Qty: 90 TABLET | Refills: 0 | OUTPATIENT
Start: 2024-09-17

## 2024-09-17 RX ORDER — FUROSEMIDE 20 MG
20 TABLET ORAL DAILY
Qty: 90 TABLET | Refills: 0 | Status: SHIPPED | OUTPATIENT
Start: 2024-09-17

## 2024-09-29 DIAGNOSIS — M54.50 CHRONIC BILATERAL LOW BACK PAIN WITHOUT SCIATICA: ICD-10-CM

## 2024-09-29 DIAGNOSIS — E78.2 MIXED HYPERLIPIDEMIA: ICD-10-CM

## 2024-09-29 DIAGNOSIS — G89.29 CHRONIC BILATERAL LOW BACK PAIN WITHOUT SCIATICA: ICD-10-CM

## 2024-09-30 RX ORDER — MELOXICAM 7.5 MG/1
7.5 TABLET ORAL DAILY
Qty: 15 TABLET | Refills: 5 | Status: SHIPPED | OUTPATIENT
Start: 2024-09-30

## 2024-09-30 RX ORDER — ROSUVASTATIN CALCIUM 10 MG/1
10 TABLET, COATED ORAL
Qty: 30 TABLET | Refills: 0 | Status: SHIPPED | OUTPATIENT
Start: 2024-09-30

## 2024-10-08 ENCOUNTER — TELEPHONE (OUTPATIENT)
Age: 84
End: 2024-10-08

## 2024-10-24 NOTE — H&P
History & Physical - [facility]  Adiel Jasso 80 y o  male MRN: [id number]  Unit/Bed#:  Encounter: [id number]    ASSESSMENT/PLAN:    Renal Stone  Right -8 x 15 mm and 1-2mm lower pole stone  Right flank pain and gross hematuria   · Cystoscopy, b/l retrogrades, and right ureteroscopy, holmium laser of stones, and stent placement    Bladder cancer  Recurrent bladder cancer hx  · Cystoscopy and bilateral retrogrades to evaluate for any recurrent lesions that may also be causing his hematuria    CAD  CAD on Plavix and ASA  Cardiology OK without Plavix or ASA for 5 days  · Holding the Plavix and ASA     Covid 19  Covid 19 pandemic  · Covid testing prior to surgery       HISTORY OF PRESENT ILLNESS:      PAST MEDICAL HISTORY:  Past Medical History:   Diagnosis Date    Ear problems     HL (hearing loss)     Tinnitus        PAST SURGICAL HISTORY:  Past Surgical History:   Procedure Laterality Date    MYRINGOTOMY W/ TUBES      TONSILLECTOMY         ALLERGIES:  Allergies   Allergen Reactions    Penicillins Other (See Comments)     none         SOCIAL HISTORY:  Social History     Substance and Sexual Activity   Alcohol Use None     Social History     Substance and Sexual Activity   Drug Use Not on file     Social History     Tobacco Use   Smoking Status Current Every Day Smoker   Smokeless Tobacco Never Used       FAMILY HISTORY:  No family history on file  MEDICATIONS:  No current facility-administered medications for this encounter       Current Outpatient Medications:     amLODIPine (NORVASC) 10 mg tablet, amlodipine 10 mg tablet  TAKE 1 TABLET BY MOUTH EVERY DAY, Disp: , Rfl:     atorvastatin (LIPITOR) 20 mg tablet, atorvastatin 20 mg tablet  TAKE ONE TABLET BY MOUTH EVERY DAY, Disp: , Rfl:     clopidogrel (PLAVIX) 75 mg tablet, Daily, Disp: , Rfl:     cyclobenzaprine (FLEXERIL) 10 mg tablet, cyclobenzaprine 10 mg tablet, Disp: , Rfl:     Dulaglutide 1 5 WS/8 5WN SOPN, Trulicity 1 5 VV/5 8 mL subcutaneous pen injector, Disp: , Rfl:     finasteride (PROSCAR) 5 mg tablet, Daily, Disp: , Rfl:     furosemide (LASIX) 20 mg tablet, furosemide 20 mg tablet  TAKE ONE TABLET BY MOUTH EVERY DAY, Disp: , Rfl:     glipiZIDE (GLUCOTROL XL) 5 mg 24 hr tablet, glipizide ER 5 mg tablet, extended release 24 hr  TAKE ONE TABLET BY MOUTH EVERY DAY, Disp: , Rfl:     ketorolac (ACULAR) 0 5 % ophthalmic solution, , Disp: , Rfl:     losartan (COZAAR) 50 mg tablet, losartan 50 mg tablet  TAKE ONE TABLET BY MOUTH EVERY DAY, Disp: , Rfl:     metoprolol succinate (TOPROL-XL) 50 mg 24 hr tablet, metoprolol succinate ER 50 mg tablet,extended release 24 hr  TAKE ONE TABLET BY MOUTH EVERY DAY, Disp: , Rfl:     nitroglycerin (Nitrostat) 0 4 mg SL tablet, Nitrostat 0 4 mg sublingual tablet, Disp: , Rfl:     nystatin-triamcinolone (MYCOLOG-II) cream, nystatin-triamcinolone 100,000 unit/g-0 1 % topical cream, Disp: , Rfl:     ofloxacin (OCUFLOX) 0 3 % ophthalmic solution, ofloxacin 0 3 % eye drops, Disp: , Rfl:     olmesartan (Benicar) 40 mg tablet, Daily, Disp: , Rfl:     oseltamivir (Tamiflu) 75 mg capsule, Tamiflu 75 mg capsule, Disp: , Rfl:     potassium chloride (K-DUR,KLOR-CON) 20 mEq tablet, potassium chloride ER 20 mEq tablet,extended release(part/cryst)  TAKE ONE TABLET BY MOUTH EVERY DAY, Disp: , Rfl:     prednisoLONE acetate (PRED FORTE) 1 % ophthalmic suspension, prednisolone acetate 1 % eye drops,suspension, Disp: , Rfl:     rosuvastatin (Crestor) 20 MG tablet, Crestor 20 mg tablet  TK 1 T PO QD, Disp: , Rfl:     tamsulosin (FLOMAX) 0 4 mg, tamsulosin 0 4 mg capsule  TAKE ONE CAPSULE BY MOUTH EVERY DAY, Disp: , Rfl:     Review of Systems    PHYSICAL EXAM:  Physical Exam    LAB RESULTS:  No results found for: WBC, HGB, HCT, MCV, PLT  No results found for: SODIUM, K, CL, CO2, BUN, CREATININE, GLUC, CALCIUM  No results found for: CALCIUM, PHOS  No results found for: PSA    OTHER STUDIES:  CT ap wo CTS 04/15/21  -Two right renal calculi noted    -Largest in the renal pelvis measuring 8 x 15mm  No hydro  -No acute process within the abdomen or pelvis     Suleiman Toledo PA-C  04/16/21    Portions of the record may have been created with voice recognition software   Occasional wrong word or "sound alike" substitutions may have occurred due to the inherent limitations of voice recognition software   Read the chart carefully and recognize, using context, where substitutions have occurred  details…

## 2024-10-27 DIAGNOSIS — E11.51 TYPE 2 DIABETES MELLITUS WITH DIABETIC PERIPHERAL ANGIOPATHY WITHOUT GANGRENE, WITHOUT LONG-TERM CURRENT USE OF INSULIN (HCC): ICD-10-CM

## 2024-10-29 RX ORDER — GLIPIZIDE 5 MG/1
TABLET, FILM COATED, EXTENDED RELEASE ORAL
Qty: 90 TABLET | Refills: 1 | Status: SHIPPED | OUTPATIENT
Start: 2024-10-29

## 2024-11-03 DIAGNOSIS — E78.2 MIXED HYPERLIPIDEMIA: ICD-10-CM

## 2024-11-03 DIAGNOSIS — I10 HTN (HYPERTENSION), BENIGN: Primary | ICD-10-CM

## 2024-11-04 RX ORDER — METOPROLOL SUCCINATE 50 MG/1
50 TABLET, EXTENDED RELEASE ORAL DAILY
Qty: 90 TABLET | Refills: 1 | Status: SHIPPED | OUTPATIENT
Start: 2024-11-04

## 2024-11-04 RX ORDER — ROSUVASTATIN CALCIUM 10 MG/1
10 TABLET, COATED ORAL
Qty: 30 TABLET | Refills: 5 | Status: SHIPPED | OUTPATIENT
Start: 2024-11-04

## 2024-11-17 DIAGNOSIS — E11.51 TYPE 2 DIABETES MELLITUS WITH DIABETIC PERIPHERAL ANGIOPATHY WITHOUT GANGRENE, WITHOUT LONG-TERM CURRENT USE OF INSULIN (HCC): ICD-10-CM

## 2024-11-17 DIAGNOSIS — E87.6 HYPOKALEMIA: ICD-10-CM

## 2024-11-19 RX ORDER — POTASSIUM CHLORIDE 1500 MG/1
20 TABLET, EXTENDED RELEASE ORAL DAILY
Qty: 90 TABLET | Refills: 1 | Status: SHIPPED | OUTPATIENT
Start: 2024-11-19

## 2024-11-24 DIAGNOSIS — I10 HTN (HYPERTENSION), BENIGN: ICD-10-CM

## 2024-11-26 RX ORDER — AMLODIPINE BESYLATE 5 MG/1
5 TABLET ORAL 2 TIMES DAILY
Qty: 60 TABLET | Refills: 5 | Status: SHIPPED | OUTPATIENT
Start: 2024-11-26

## 2024-12-03 DIAGNOSIS — E34.9 TESTOSTERONE DEFICIENCY: ICD-10-CM

## 2024-12-04 ENCOUNTER — OFFICE VISIT (OUTPATIENT)
Age: 84
End: 2024-12-04
Payer: MEDICARE

## 2024-12-04 VITALS
DIASTOLIC BLOOD PRESSURE: 78 MMHG | HEART RATE: 42 BPM | BODY MASS INDEX: 31.84 KG/M2 | OXYGEN SATURATION: 97 % | HEIGHT: 69 IN | WEIGHT: 215 LBS | RESPIRATION RATE: 16 BRPM | TEMPERATURE: 97.4 F | SYSTOLIC BLOOD PRESSURE: 128 MMHG

## 2024-12-04 DIAGNOSIS — E11.51 TYPE 2 DIABETES MELLITUS WITH DIABETIC PERIPHERAL ANGIOPATHY WITHOUT GANGRENE, WITHOUT LONG-TERM CURRENT USE OF INSULIN (HCC): Primary | ICD-10-CM

## 2024-12-04 DIAGNOSIS — I25.10 ATHEROSCLEROSIS OF NATIVE CORONARY ARTERY OF NATIVE HEART WITHOUT ANGINA PECTORIS: ICD-10-CM

## 2024-12-04 DIAGNOSIS — E78.2 MIXED HYPERLIPIDEMIA: ICD-10-CM

## 2024-12-04 DIAGNOSIS — I77.9 PERIPHERAL ARTERIAL OCCLUSIVE DISEASE (HCC): ICD-10-CM

## 2024-12-04 DIAGNOSIS — I10 HTN (HYPERTENSION), BENIGN: ICD-10-CM

## 2024-12-04 PROBLEM — Z85.51 HISTORY OF BLADDER CANCER: Status: ACTIVE | Noted: 2024-12-04

## 2024-12-04 PROCEDURE — G2211 COMPLEX E/M VISIT ADD ON: HCPCS

## 2024-12-04 PROCEDURE — 99214 OFFICE O/P EST MOD 30 MIN: CPT

## 2024-12-04 RX ORDER — TAMSULOSIN HYDROCHLORIDE 0.4 MG/1
0.4 CAPSULE ORAL 2 TIMES DAILY
COMMUNITY

## 2024-12-04 NOTE — ASSESSMENT & PLAN NOTE
Lab Results   Component Value Date    HGBA1C 7.3 (H) 12/03/2024   Under control.    Continue current medication.    We will re-evaluate at next office visit.      Orders:    Albumin / creatinine urine ratio; Future    Basic metabolic panel; Future    Hemoglobin A1C; Future

## 2024-12-04 NOTE — PROGRESS NOTES
ASSESSMENT:     Bilateral groin pain  -  Worse with flexion and IR/ER of hip joint  - Likely hip OA  - Will start with hip xray    The patient has groin pain, which is worsened with flexion and internal/external rotation of the hip joint. I feel that some of the low back pain may be referred from the hip. I will start with a fluoroscopic guided intra-articular hip injection for diagnostic purposes.  If the pain is significantly better with the local anesthetic portion of the injection, then the hip, and not the low back, is the primary pain generator.    Lumbar OA  - no relief with RF  - Pain is axial, discussed likely not a surgical candidate given age, comorbidity, uncontrolled DM    Lumbar spinal stenosis, severe, without neurogenic claudication present    Bilateral knee pain status post bilateral TKA  - postoperative x-rays satisfactory  - postoperative synovial fluid analysis unrevealing  - genicular RF without relief    PLAN:  1) Medical Modalities:     Cont tramadol to 2 tablets t.i.d.    cymbalta 20 to 40    2) Interventional Modalities:     Bilateral Hip IA pending imaging  Do not hold  Plavix  Aspirin 81    3) Behavioral Medicine Modalities:    - None    4) Other Modalities:     none    5) Imaging/Labs:   BL hips    HISTORY OF PRESENT ILLNESS:  The patient presents with bilateral knee and calf pain which began 6/21.   No inciting event recalled    The pain does not radiate.      The pain is described as sharp and aching in character.       Patient reports that the pain is worse with activity. The pain is better with rest.    The patient denies numbness.  The patient denies tingling.  The patient reports weakness in the same distribution.    Comorbid conditions:  Hyperlipidemia  Hypertension  Coronary artery disease   Diabetes  CHF with pulmonary hypertension  GERD   YURI   Gout    PAIN COURSE AND PREVIOUS INTERVENTIONS:  Medications:  Gabapentin 600 mg TID  Magnesium  Naproxen    Topicals    Ineffective/not  Name: Refugio Gonzalez      : 1940      MRN: [id number]  Encounter Provider: Loco Mejia MD  Encounter Date: 2024   Encounter department: [facility] PRIMARY [facility]ARE  :  Assessment & Plan  Type 2 diabetes mellitus with diabetic peripheral angiopathy without gangrene, without long-term current use of insulin (HCC)    Lab Results   Component Value Date    HGBA1C 7.3 (H) 2024   Under control.    Continue current medication.    We will re-evaluate at next office visit.      Orders:    Albumin / creatinine urine ratio; Future    Basic metabolic panel; Future    Hemoglobin A1C; Future    HTN (hypertension), benign  Under control.    Continue amlodipine, losartan and metoprolol  We will re-evaluate at next office visit.           Mixed hyperlipidemia  Under control.    Continue rosuvastatin  We will re-evaluate at next office visit.           Peripheral arterial occlusive disease (HCC)  Under control.    Continue current medication.    Patient has been followed by cardiologist  We will re-evaluate at next office visit.           Atherosclerosis of native coronary artery of native heart without angina pectoris  Under control.    Continue current medication.    Patient has been followed by cardiologist  We will re-evaluate at next office visit.                  History of Present Illness     Patient here for review of chronic medical problems and  the labs and imaging if it is applicable.  Currently has no specific complaints other than mentioned in the review of systems  Denies chest pain, SOB, cough, abdominal pain, nausea, vomiting, fever, chills, lightheadedness, dizziness,headache, tingling or numbness.No bowel or bladder problem.        Review of Systems   Constitutional:  Negative for chills, fatigue and fever.   HENT:  Negative for congestion, ear pain, rhinorrhea, sneezing and sore throat.    Eyes:  Negative for redness, itching and visual disturbance.   Respiratory:  Negative for  cough, chest tightness and shortness of breath.    Cardiovascular:  Negative for chest pain, palpitations and leg swelling.   Gastrointestinal:  Negative for abdominal pain, blood in stool, diarrhea, nausea and vomiting.   Endocrine: Negative for cold intolerance and heat intolerance.   Genitourinary:  Negative for dysuria, frequency and urgency.   Musculoskeletal:  Positive for back pain. Negative for arthralgias and myalgias.   Skin:  Negative for color change and rash.   Neurological:  Negative for dizziness, weakness, light-headedness, numbness and headaches.   Hematological:  Does not bruise/bleed easily.   Psychiatric/Behavioral:  Negative for agitation, behavioral problems and confusion.      Medical History Reviewed by provider this encounter:  Tobacco  Allergies  Meds  Problems  Med Hx  Surg Hx  Fam Hx     .  Past Medical History   Past Medical History:   Diagnosis Date    Bladder polyps     history of    Colon polyp     Coronary artery disease     Diabetes mellitus (HCC)     DJD (degenerative joint disease)     Ear problems     HL (hearing loss)     Hyperlipidemia     Hypertension     Kidney stone     Myocardial infarction (HCC)     Obesity     Osteoporosis     Tinnitus      Past Surgical History:   Procedure Laterality Date    BLADDER SURGERY      CARDIAC CATHETERIZATION      CATARACT EXTRACTION  11/15/2020    CATARACT EXTRACTION  12/03/2020    CHOLECYSTECTOMY      COLONOSCOPY      CORONARY ANGIOPLASTY WITH STENT PLACEMENT      x3 last one 2019    FL RETROGRADE PYELOGRAM  04/20/2021    MYRINGOTOMY W/ TUBES      VT CYSTO/URETERO W/LITHOTRIPSY &INDWELL STENT INSRT Right 04/20/2021    Procedure: CYSTOSCOPY RIGHT  URETEROSCOPY WITH LITHOTRIPSY HOLMIUM LASER, AGNIESZKA  RETROGRADE PYELOGRAM AND INSERTION  RIGHT STENT URETERAL;  Surgeon: Josh Medina MD;  Location: [facility];  Service: Urology    TONSILLECTOMY      WISDOM TOOTH EXTRACTION       Family History   Problem Relation Age of Onset    Heart  tolerated:  Ketamine gel     Adjuvants:  PT  Lymphedema therapy for LLE  Weight loss clinic    Interventions:    Right L3-5 RF   Left L3-5 RF  at W  22, Right Genicular RF, 0% relief   22, Left Genicular RF, 0% relief   2023, BL L3-5 RF, 0% relief     PHYSICAL EXAMINATION:    General: Alert and oriented to person  Affect:  No apparent distress  Head: Normocephalic, atraumatic  Neck: No gross asymmetry noted, no masses noted  Eyes: Anicteric; no injection  Ears: No notable scar/lesions/masses  Respiratory: breathing comfortably    MOTOR EXAMINATION:  LOWER EXTREMITY      LEFT RIGHT   Iliopsoas 5/5 5/5   Quadriceps 5/5 5/5   Hamstrings 5/5 5/5   Foot Dorsiflexion 5/5 5/5   Extensor hallucis longus 5/5 5/5   Gastrocnemius 5/5 5/5     Pain in Right groin with hip abduction/adduction/internal rotation/external rotation  present  Pain in Left   groin with hip abduction/adduction/internal rotation/external rotation  absent        IMAGIN/16 MRI L SPINE    1. Chronic bilateral L5 pars interarticularis defects with   grade II-grade III anterolisthesis of L5 on S1. The canal is   patent at that level. There is severe stenosis of the L5-S1   foramina, left greater than right, with compression of both   exiting L5 nerve roots.   2. Diffuse disc bulge and endplate hypertrophy with   bilateral facet and ligamentum flavum hypertrophy at L2-L3.   Severe canal stenosis and moderate to severe bilateral   foraminal stenosis is noted at that level.   3. Right lateralizing disc bulge at L3-L4 with bilateral   facet and ligamentum flavum hypertrophy. There is severe   canal stenosis with moderate left and severe right foraminal   stenosis at that level.   4. Severe right and moderate left foraminal stenosis at   L4-L5 with a right lateralizing disc bulge and right greater   than left facet hypertrophy noted at that level.   5. Mild lumbar levoscoliosis.     JACKIE:emw  Narrative    Examination: MRI lumbar  disease Mother     Cancer Father       reports that he quit smoking about 9 months ago. His smoking use included cigarettes. He has been exposed to tobacco smoke. He has never used smokeless tobacco. He reports that he does not currently use alcohol. He reports that he does not use drugs.  Current Outpatient Medications on File Prior to Visit   Medication Sig Dispense Refill    amLODIPine (NORVASC) 5 mg tablet TAKE ONE TABLET BY MOUTH TWICE A DAY 60 tablet 5    aspirin (ECOTRIN LOW STRENGTH) 81 mg EC tablet Take 81 mg by mouth 2 (two) times a day      clopidogrel (PLAVIX) 75 mg tablet Take 1 tablet (75 mg total) by mouth daily 90 tablet 3    dapagliflozin (Farxiga) 10 MG tablet Take 1 tablet (10 mg total) by mouth daily 90 tablet 3    Dulaglutide 1.5 MG/0.5ML SOPN once a week      finasteride (PROSCAR) 5 mg tablet Daily      furosemide (LASIX) 20 mg tablet Take 1 tablet (20 mg total) by mouth daily 90 tablet 0    glipiZIDE (GLUCOTROL XL) 5 mg 24 hr tablet TAKE ONE TABLET BY MOUTH EVERY DAY 90 tablet 1    GLUCOSAMINE-CHONDROITIN PO Take by mouth in the morning      losartan (COZAAR) 50 mg tablet TAKE ONE TABLET BY MOUTH EVERY DAY 90 tablet 1    meloxicam (MOBIC) 7.5 mg tablet TAKE ONE TABLET BY MOUTH EVERY DAY 15 tablet 5    metFORMIN (GLUCOPHAGE) 1000 MG tablet TAKE ONE TABLET BY MOUTH TWICE A DAY WITH MEALS 180 tablet 1    metoprolol succinate (TOPROL-XL) 50 mg 24 hr tablet TAKE ONE TABLET BY MOUTH EVERY DAY 90 tablet 1    potassium chloride (Klor-Con M20) 20 mEq tablet TAKE ONE TABLET BY MOUTH EVERY DAY 90 tablet 1    rosuvastatin (CRESTOR) 10 MG tablet TAKE ONE TABLET BY MOUTH AT BEDTIME 30 tablet 5    tamsulosin (FLOMAX) 0.4 mg Take 0.4 mg by mouth 2 (two) times a day      testosterone cypionate (DEPO-TESTOSTERONE) 200 mg/mL SOLN INJECT 0.25ML INTRAMUSCULARLY ONCE WEEKLY 3 mL 0    [DISCONTINUED] tamsulosin (FLOMAX) 0.4 mg TAKE ONE CAPSULE BY MOUTH EVERY DAY (Patient taking differently: 0.4 mg 2 (two) times a  spine without contrast.     Clinical information: 66-year-old male with low back pain   that radiates down both legs for one year.     Comparison: No prior MRI examinations are available for   comparison. The examination is correlated with prior   radiographs dated 08/18/2016.     Technique: Sagittal T1-weighted, sagittal T2-weighted, axial   T1-weighted, and axial T2-weighted MR imaging of the lumbar   spine was performed.     Findings:     Alignment: Grade II-grade III anterolisthesis of L5 on S1   related to bilateral L5 pars interarticularis defects..   There is a mild mid lumbar levoscoliosis.     Developmental spinal canal narrowing: There is no   developmental spinal canal narrowing.     Discs: Extensive degenerative disc disease is noted   throughout the lumbar spine with loss of disc height and   long TR signal in all of the discs from L2-L3 through L5-S1.   Long TR signal is also noted in the T11-T12 disc with mild   loss of disc height and a small central disc protrusion at   that level.     Vertebrae: Vertebrae are maintained in height and marrow   signal with the exception of degenerative endplate changes   and the L5 pars defects.     Distal cord: The distal cord has normal signal.  The conus   terminates at T12.       Level-wise findings are as follows:     Imaged lower thoracic levels: There is no significant spinal   canal or foraminal stenosis noted on the sagittal images at   T11-T12 or T12-L1. There is a probable small central disc   protrusion at the T11-T12 level.         L1-L2: There is no significant spinal canal or foraminal   stenosis noted on the sagittal images.         L2-L3: There is a diffuse disc bulge with partially   overhanging endplate hypertrophy. There is bilateral facet   and ligamentum flavum hypertrophy with an effusion in the   right facet joint. Severe canal stenosis is present and   there is moderate to severe bilateral foraminal stenosis   with minimal preserved fat  "day) 90 capsule 0    B-D 3CC LUER-KAYLEY SYR 22GX1\" 22G X 1\" 3 ML MISC USE ONE SYRINGE INTRAMUSCULARLY ONCE WEEKLY AS DIRECTED (Patient not taking: Reported on 10/31/2024) 100 each 1    clotrimazole-betamethasone (LOTRISONE) 1-0.05 % cream Apply topically 2 (two) times a day       No current facility-administered medications on file prior to visit.     Allergies   Allergen Reactions    Penicillins Rash     none        Current Outpatient Medications on File Prior to Visit   Medication Sig Dispense Refill    amLODIPine (NORVASC) 5 mg tablet TAKE ONE TABLET BY MOUTH TWICE A DAY 60 tablet 5    aspirin (ECOTRIN LOW STRENGTH) 81 mg EC tablet Take 81 mg by mouth 2 (two) times a day      clopidogrel (PLAVIX) 75 mg tablet Take 1 tablet (75 mg total) by mouth daily 90 tablet 3    dapagliflozin (Farxiga) 10 MG tablet Take 1 tablet (10 mg total) by mouth daily 90 tablet 3    Dulaglutide 1.5 MG/0.5ML SOPN once a week      finasteride (PROSCAR) 5 mg tablet Daily      furosemide (LASIX) 20 mg tablet Take 1 tablet (20 mg total) by mouth daily 90 tablet 0    glipiZIDE (GLUCOTROL XL) 5 mg 24 hr tablet TAKE ONE TABLET BY MOUTH EVERY DAY 90 tablet 1    GLUCOSAMINE-CHONDROITIN PO Take by mouth in the morning      losartan (COZAAR) 50 mg tablet TAKE ONE TABLET BY MOUTH EVERY DAY 90 tablet 1    meloxicam (MOBIC) 7.5 mg tablet TAKE ONE TABLET BY MOUTH EVERY DAY 15 tablet 5    metFORMIN (GLUCOPHAGE) 1000 MG tablet TAKE ONE TABLET BY MOUTH TWICE A DAY WITH MEALS 180 tablet 1    metoprolol succinate (TOPROL-XL) 50 mg 24 hr tablet TAKE ONE TABLET BY MOUTH EVERY DAY 90 tablet 1    potassium chloride (Klor-Con M20) 20 mEq tablet TAKE ONE TABLET BY MOUTH EVERY DAY 90 tablet 1    rosuvastatin (CRESTOR) 10 MG tablet TAKE ONE TABLET BY MOUTH AT BEDTIME 30 tablet 5    tamsulosin (FLOMAX) 0.4 mg Take 0.4 mg by mouth 2 (two) times a day      testosterone cypionate (DEPO-TESTOSTERONE) 200 mg/mL SOLN INJECT 0.25ML INTRAMUSCULARLY ONCE WEEKLY 3 mL 0    " around the exiting L2 nerve   roots.     L3-L4: There is a diffuse disc bulge lateralizing to the   right. There is bilateral facet hypertrophy and ligamentum   flavum hypertrophy with effusions in both facet joints.   There is severe canal stenosis with moderate left and severe   right foraminal stenosis. There is compression of the   exiting right L3 nerve root.     L4-L5: There is a right lateralizing disc bulge and right   greater than left facet hypertrophy with an effusion in the   right facet joint. The canal is not stenotic at the level of   the disc. There is severe right and moderate left foraminal   stenosis with compression of the exiting right L4 nerve   root.         L5-S1: There are chronic bilateral L5 pars interarticularis   defects. There is uncovering of the L5-S1 disc. There is   marked disc height loss with prominent degenerative endplate   changes. There is bilateral L5-S1 facet arthropathy with   hypertrophy of the facet joints. The canal is widened at the   level of the subluxation. There is severe bilateral   foraminal stenosis, left greater than right, with   compression of both exiting L5 nerve roots.         Imaged sacroiliac joints: The imaged sacroiliac joints are   normal.       "[DISCONTINUED] tamsulosin (FLOMAX) 0.4 mg TAKE ONE CAPSULE BY MOUTH EVERY DAY (Patient taking differently: 0.4 mg 2 (two) times a day) 90 capsule 0    B-D 3CC LUER-KAYLEY SYR 22GX1\" 22G X 1\" 3 ML MISC USE ONE SYRINGE INTRAMUSCULARLY ONCE WEEKLY AS DIRECTED (Patient not taking: Reported on 10/31/2024) 100 each 1    clotrimazole-betamethasone (LOTRISONE) 1-0.05 % cream Apply topically 2 (two) times a day       No current facility-administered medications on file prior to visit.      Social History     Tobacco Use    Smoking status: Former     Current packs/day: 0.00     Types: Cigarettes     Quit date: 2024     Years since quittin.8     Passive exposure: Past    Smokeless tobacco: Never   Vaping Use    Vaping status: Never Used   Substance and Sexual Activity    Alcohol use: Not Currently    Drug use: Never    Sexual activity: Not on file        Objective   /78 (BP Location: Left arm, Patient Position: Sitting, Cuff Size: Standard)   Pulse (!) 42   Temp (!) 97.4 °F (36.3 °C) (Tympanic)   Resp 16   Ht 5' 9\" (1.753 m)   Wt 97.5 kg (215 lb)   SpO2 97%   BMI 31.75 kg/m²      Physical Exam  Vitals and nursing note reviewed.   Constitutional:       General: He is not in acute distress.     Appearance: Normal appearance. He is well-developed and normal weight. He is not ill-appearing, toxic-appearing or diaphoretic.   HENT:      Head: Normocephalic and atraumatic.      Right Ear: Tympanic membrane, ear canal and external ear normal. There is no impacted cerumen.      Left Ear: Tympanic membrane, ear canal and external ear normal. There is no impacted cerumen.      Nose: Nose normal. No congestion or rhinorrhea.      Mouth/Throat:      Mouth: Mucous membranes are moist.      Pharynx: Oropharynx is clear.   Eyes:      General: No scleral icterus.        Right eye: No discharge.         Left eye: No discharge.      Extraocular Movements: Extraocular movements intact.      Conjunctiva/sclera: Conjunctivae " normal.      Pupils: Pupils are equal, round, and reactive to light.   Cardiovascular:      Rate and Rhythm: Normal rate and regular rhythm.      Pulses: Normal pulses.      Heart sounds: Normal heart sounds. No murmur heard.     No gallop.   Pulmonary:      Effort: Pulmonary effort is normal. No respiratory distress.      Breath sounds: Normal breath sounds. No wheezing or rales.   Abdominal:      General: Abdomen is flat. Bowel sounds are normal. There is no distension.      Palpations: Abdomen is soft.      Tenderness: There is no abdominal tenderness. There is no right CVA tenderness, left CVA tenderness or guarding.   Musculoskeletal:         General: No swelling or tenderness. Normal range of motion.      Cervical back: Normal range of motion and neck supple. No rigidity.      Right lower leg: No edema.      Left lower leg: No edema.   Lymphadenopathy:      Cervical: No cervical adenopathy.   Skin:     General: Skin is warm and dry.      Capillary Refill: Capillary refill takes 2 to 3 seconds.      Coloration: Skin is not jaundiced.   Neurological:      General: No focal deficit present.      Mental Status: He is alert and oriented to person, place, and time. Mental status is at baseline.      Sensory: No sensory deficit.      Motor: No weakness.   Psychiatric:         Mood and Affect: Mood normal.         Behavior: Behavior normal.

## 2024-12-04 NOTE — ASSESSMENT & PLAN NOTE
Under control.    Continue current medication.    Patient has been followed by cardiologist  We will re-evaluate at next office visit.

## 2024-12-04 NOTE — ASSESSMENT & PLAN NOTE
Under control.    Continue amlodipine, losartan and metoprolol  We will re-evaluate at next office visit.

## 2024-12-05 RX ORDER — TESTOSTERONE CYPIONATE 200 MG/ML
INJECTION, SOLUTION INTRAMUSCULAR
Qty: 3 ML | Refills: 0 | Status: SHIPPED | OUTPATIENT
Start: 2024-12-05

## 2024-12-19 NOTE — ASSESSMENT & PLAN NOTE
Under control. Continue current medication. We will re-evaluate at next office visit.   Has been followed by a cardiologist Warm

## 2024-12-22 DIAGNOSIS — R60.0 EDEMA OF BOTH LOWER LEGS: ICD-10-CM

## 2024-12-23 RX ORDER — FUROSEMIDE 20 MG/1
20 TABLET ORAL DAILY
Qty: 90 TABLET | Refills: 1 | Status: SHIPPED | OUTPATIENT
Start: 2024-12-23

## 2025-01-12 DIAGNOSIS — M54.50 CHRONIC BILATERAL LOW BACK PAIN WITHOUT SCIATICA: ICD-10-CM

## 2025-01-12 DIAGNOSIS — G89.29 CHRONIC BILATERAL LOW BACK PAIN WITHOUT SCIATICA: ICD-10-CM

## 2025-01-13 ENCOUNTER — OFFICE VISIT (OUTPATIENT)
Dept: LAB | Facility: CLINIC | Age: 85
End: 2025-01-13
Payer: MEDICARE

## 2025-01-13 DIAGNOSIS — Z01.818 PREOP TESTING: ICD-10-CM

## 2025-01-13 PROCEDURE — 93005 ELECTROCARDIOGRAM TRACING: CPT

## 2025-01-13 RX ORDER — MELOXICAM 7.5 MG/1
7.5 TABLET ORAL DAILY
Qty: 15 TABLET | Refills: 5 | Status: SHIPPED | OUTPATIENT
Start: 2025-01-13

## 2025-01-14 DIAGNOSIS — E11.51 TYPE 2 DIABETES MELLITUS WITH DIABETIC PERIPHERAL ANGIOPATHY WITHOUT GANGRENE, WITHOUT LONG-TERM CURRENT USE OF INSULIN (HCC): ICD-10-CM

## 2025-01-14 NOTE — TELEPHONE ENCOUNTER
[facility] Farxiga. Please print a prescription so I can fax it to the prescription program for the patient. Patient is aware and confirmed the medication they need.

## 2025-01-15 ENCOUNTER — VBI (OUTPATIENT)
Dept: ADMINISTRATIVE | Facility: OTHER | Age: 85
End: 2025-01-15

## 2025-01-15 ENCOUNTER — TELEPHONE (OUTPATIENT)
Dept: GASTROENTEROLOGY | Facility: CLINIC | Age: 85
End: 2025-01-15

## 2025-01-15 LAB
ATRIAL RATE: 73 BPM
P AXIS: 55 DEGREES
PR INTERVAL: 216 MS
QRS AXIS: 49 DEGREES
QRSD INTERVAL: 90 MS
QT INTERVAL: 404 MS
QTC INTERVAL: 446 MS
T WAVE AXIS: 66 DEGREES
VENTRICULAR RATE: 73 BPM

## 2025-01-15 PROCEDURE — 93010 ELECTROCARDIOGRAM REPORT: CPT | Performed by: INTERNAL MEDICINE

## 2025-01-15 RX ORDER — DAPAGLIFLOZIN 10 MG/1
10 TABLET, FILM COATED ORAL DAILY
Qty: 90 TABLET | Refills: 3 | Status: SHIPPED | OUTPATIENT
Start: 2025-01-15

## 2025-01-15 NOTE — TELEPHONE ENCOUNTER
Pt needs to be scheduled for an Office Visit due to age to discuss colonoscopy for hx of tubular adenoma polyps (Dr Driver pt). I lmom for pt to please call back to schedule with one of our GI providers as Dr Driver has retired. Recall letter mailed.

## 2025-01-15 NOTE — TELEPHONE ENCOUNTER
01/15/25 7:01 AM     Chart reviewed for   PREV-13: Statin Therapy for the Prevention and Treatment of Cardiovascular Disease   was/were submitted to the patient's insurance.     Chiac Phipps   [facility]

## 2025-01-19 DIAGNOSIS — E11.51 TYPE 2 DIABETES MELLITUS WITH DIABETIC PERIPHERAL ANGIOPATHY WITHOUT GANGRENE, WITHOUT LONG-TERM CURRENT USE OF INSULIN (HCC): Primary | ICD-10-CM

## 2025-01-20 RX ORDER — DULAGLUTIDE 1.5 MG/.5ML
INJECTION, SOLUTION SUBCUTANEOUS
Qty: 8 ML | Refills: 0 | Status: SHIPPED | OUTPATIENT
Start: 2025-01-20

## 2025-02-04 ENCOUNTER — TELEPHONE (OUTPATIENT)
Age: 85
End: 2025-02-04

## 2025-02-04 NOTE — TELEPHONE ENCOUNTER
I spoke with someone from [facility] in regards for a fax sheet that we received at our office from [facility] for patient injection. There were suppose to fax us a new sheet to fill out but have not received anything. I faxed to the pharmacy to get a new sheet faxed over to our office. Scanned into the chart.

## 2025-02-06 NOTE — TELEPHONE ENCOUNTER
Spoke with patient and he stated he spoke with someone in getting a form faxed over to us. I stated to the patient I reached out to them and requested a new form through via fax.

## 2025-02-06 NOTE — TELEPHONE ENCOUNTER
Patient is calling for an update on this form.  He stated that if he can be of assistance in obtaining a new form please call him.  He also stated that he will reach out to [facility] to see if they faxed the new form that was requested by office.    Patient would like an update either way.

## 2025-02-10 NOTE — TELEPHONE ENCOUNTER
Called patient and let him know that Jessie had reached out to have the forms faxed again. Told patient I would be looking for the form and give it to Jessie to start to process.  Patient was thankful I followed up

## 2025-02-10 NOTE — TELEPHONE ENCOUNTER
Patient called in to follow up about forms. Nothing in Media. Patient is going to call to see Emma to see if they received fax requested and to fax forms over. Warm transfer was unavailable. Please advise. Thank you.

## 2025-02-13 NOTE — TELEPHONE ENCOUNTER
Called patient and let him know the Becky form is here-Dr Mejia filled out his part and that he could come pick it up and fill in his part and then mail it to Becky.  Told patient the form is in the patient pick filing cabinet

## 2025-02-24 ENCOUNTER — VBI (OUTPATIENT)
Dept: ADMINISTRATIVE | Facility: OTHER | Age: 85
End: 2025-02-24

## 2025-02-24 NOTE — TELEPHONE ENCOUNTER
02/24/25 9:28 AM     Chart reviewed for PREV-13: Statin Therapy for the Prevention and Treatment of Cardiovascular Disease was/were submitted to the patient's insurance.     Dianne Wilcox MA   [facility]

## 2025-03-07 LAB — HBA1C MFR BLD HPLC: 8.2 %

## 2025-03-11 ENCOUNTER — OFFICE VISIT (OUTPATIENT)
Age: 85
End: 2025-03-11
Payer: MEDICARE

## 2025-03-11 VITALS
OXYGEN SATURATION: 98 % | HEART RATE: 79 BPM | DIASTOLIC BLOOD PRESSURE: 80 MMHG | RESPIRATION RATE: 18 BRPM | TEMPERATURE: 97.2 F | BODY MASS INDEX: 31.31 KG/M2 | HEIGHT: 69 IN | SYSTOLIC BLOOD PRESSURE: 132 MMHG | WEIGHT: 211.4 LBS

## 2025-03-11 DIAGNOSIS — I25.10 ATHEROSCLEROSIS OF NATIVE CORONARY ARTERY OF NATIVE HEART WITHOUT ANGINA PECTORIS: ICD-10-CM

## 2025-03-11 DIAGNOSIS — I10 HTN (HYPERTENSION), BENIGN: ICD-10-CM

## 2025-03-11 DIAGNOSIS — G89.29 CHRONIC BILATERAL LOW BACK PAIN WITHOUT SCIATICA: ICD-10-CM

## 2025-03-11 DIAGNOSIS — E78.2 MIXED HYPERLIPIDEMIA: ICD-10-CM

## 2025-03-11 DIAGNOSIS — E11.51 TYPE 2 DIABETES MELLITUS WITH DIABETIC PERIPHERAL ANGIOPATHY WITHOUT GANGRENE, WITHOUT LONG-TERM CURRENT USE OF INSULIN (HCC): Primary | ICD-10-CM

## 2025-03-11 DIAGNOSIS — M54.50 CHRONIC BILATERAL LOW BACK PAIN WITHOUT SCIATICA: ICD-10-CM

## 2025-03-11 PROCEDURE — G2211 COMPLEX E/M VISIT ADD ON: HCPCS

## 2025-03-11 PROCEDURE — 99214 OFFICE O/P EST MOD 30 MIN: CPT

## 2025-03-11 NOTE — ASSESSMENT & PLAN NOTE
Lab Results   Component Value Date    HGBA1C 7.3 (H) 12/03/2024   Under control.    Continue current medication.    We will re-evaluate at next office visit.

## 2025-03-11 NOTE — PROGRESS NOTES
Name: Refugio Gonzalez      : 1940      MRN: [id number]  Encounter Provider: Loco Mejia MD  Encounter Date: 3/11/2025   Encounter department: [facility]  :  Assessment & Plan  Type 2 diabetes mellitus with diabetic peripheral angiopathy without gangrene, without long-term current use of insulin (HCC)    Lab Results   Component Value Date    HGBA1C 7.3 (H) 2024   Under control.    Continue current medication.    We will re-evaluate at next office visit.           HTN (hypertension), benign  Under control.    Continue amlodipine, losartan and metoprolol  We will re-evaluate at next office visit.           Mixed hyperlipidemia  Under control.    Continue rosuvastatin  We will re-evaluate at next office visit.           Atherosclerosis of native coronary artery of native heart without angina pectoris  Under control.    Continue current medication.    Patient has been followed by cardiologist  We will re-evaluate at next office visit.           Chronic bilateral low back pain without sciatica  Under control.    Continue current medication.    We will re-evaluate at next office visit.                History of Present Illness   Patient here for review of chronic medical problems and  the labs and imaging if it is applicable.  Currently has no specific complaints other than mentioned in the review of systems  Denies chest pain, SOB, cough, abdominal pain, nausea, vomiting, fever, chills, lightheadedness, dizziness,headache, tingling or numbness.No bowel or bladder problem.        Review of Systems   Constitutional:  Negative for chills, fatigue and fever.   HENT:  Negative for congestion, ear pain, rhinorrhea, sneezing and sore throat.    Eyes:  Negative for redness, itching and visual disturbance.   Respiratory:  Negative for cough, chest tightness and shortness of breath.    Cardiovascular:  Negative for chest pain, palpitations and leg swelling.   Gastrointestinal:  Negative  "for abdominal pain, blood in stool, diarrhea, nausea and vomiting.   Endocrine: Negative for cold intolerance and heat intolerance.   Genitourinary:  Negative for dysuria, frequency and urgency.   Musculoskeletal:  Positive for back pain. Negative for arthralgias and myalgias.   Skin:  Negative for color change and rash.   Neurological:  Negative for dizziness, weakness, light-headedness, numbness and headaches.   Hematological:  Does not bruise/bleed easily.   Psychiatric/Behavioral:  Negative for agitation, behavioral problems and confusion.        Objective   /80 (BP Location: Right arm, Patient Position: Sitting, Cuff Size: Standard)   Pulse 79   Temp (!) 97.2 °F (36.2 °C) (Temporal)   Resp 18   Ht 5' 9\" (1.753 m)   Wt 95.9 kg (211 lb 6.4 oz)   SpO2 98%   BMI 31.22 kg/m²      Physical Exam  Vitals and nursing note reviewed.   Constitutional:       General: He is not in acute distress.     Appearance: Normal appearance. He is well-developed and normal weight. He is not ill-appearing, toxic-appearing or diaphoretic.   HENT:      Head: Normocephalic and atraumatic.      Right Ear: External ear normal.      Left Ear: External ear normal.      Nose: Nose normal. No congestion or rhinorrhea.      Mouth/Throat:      Mouth: Mucous membranes are moist.      Pharynx: Oropharynx is clear.   Eyes:      General: No scleral icterus.        Right eye: No discharge.         Left eye: No discharge.      Extraocular Movements: Extraocular movements intact.      Conjunctiva/sclera: Conjunctivae normal.      Pupils: Pupils are equal, round, and reactive to light.   Cardiovascular:      Rate and Rhythm: Normal rate and regular rhythm.      Pulses: Normal pulses.      Heart sounds: Normal heart sounds. No murmur heard.     No gallop.   Pulmonary:      Effort: Pulmonary effort is normal. No respiratory distress.      Breath sounds: Normal breath sounds. No wheezing or rales.   Abdominal:      General: Abdomen is flat. " Bowel sounds are normal. There is no distension.      Palpations: Abdomen is soft.      Tenderness: There is no abdominal tenderness. There is no right CVA tenderness, left CVA tenderness or guarding.   Musculoskeletal:         General: No swelling or tenderness. Normal range of motion.      Cervical back: Normal range of motion and neck supple. No rigidity.      Right lower leg: No edema.      Left lower leg: No edema.   Lymphadenopathy:      Cervical: No cervical adenopathy.   Skin:     General: Skin is warm and dry.      Capillary Refill: Capillary refill takes 2 to 3 seconds.      Coloration: Skin is not jaundiced.   Neurological:      General: No focal deficit present.      Mental Status: He is alert and oriented to person, place, and time. Mental status is at baseline.      Sensory: No sensory deficit.      Motor: No weakness.   Psychiatric:         Mood and Affect: Mood normal.         Behavior: Behavior normal.

## 2025-03-13 DIAGNOSIS — E11.51 TYPE 2 DIABETES MELLITUS WITH DIABETIC PERIPHERAL ANGIOPATHY WITHOUT GANGRENE, WITHOUT LONG-TERM CURRENT USE OF INSULIN (HCC): Primary | ICD-10-CM

## 2025-03-13 NOTE — TELEPHONE ENCOUNTER
[facility] called requesting a prescription for 21G X1 ml Syringes 3 ml  a box of 100.  The previous size the patient was getting are no longer available. Please send the prescription to  [id number] - [place] - [address]

## 2025-03-14 RX ORDER — NEEDLES, DISPOSABLE 25GX5/8"
NEEDLE, DISPOSABLE MISCELLANEOUS 2 TIMES DAILY
Qty: 100 EACH | Refills: 0 | Status: SHIPPED | OUTPATIENT
Start: 2025-03-14

## 2025-03-14 NOTE — TELEPHONE ENCOUNTER
Call received from pharmacy stating that the script for BD needle is out of stock and would like to know if it can be changes to 3 mL 21 gauge 1 inch. Also the testosterone script for written as twice a day instead of weekly. Please advise.

## 2025-03-17 RX ORDER — NEEDLES, SAFETY 18GX1 1/2"
NEEDLE, DISPOSABLE MISCELLANEOUS 2 TIMES DAILY
Qty: 100 EACH | Refills: 0 | Status: SHIPPED | OUTPATIENT
Start: 2025-03-17

## 2025-04-20 DIAGNOSIS — M54.50 CHRONIC BILATERAL LOW BACK PAIN WITHOUT SCIATICA: ICD-10-CM

## 2025-04-20 DIAGNOSIS — G89.29 CHRONIC BILATERAL LOW BACK PAIN WITHOUT SCIATICA: ICD-10-CM

## 2025-04-21 RX ORDER — MELOXICAM 7.5 MG/1
7.5 TABLET ORAL DAILY
Qty: 15 TABLET | Refills: 1 | Status: SHIPPED | OUTPATIENT
Start: 2025-04-21

## 2025-05-04 DIAGNOSIS — E11.51 TYPE 2 DIABETES MELLITUS WITH DIABETIC PERIPHERAL ANGIOPATHY WITHOUT GANGRENE, WITHOUT LONG-TERM CURRENT USE OF INSULIN (HCC): ICD-10-CM

## 2025-05-04 DIAGNOSIS — I10 BENIGN ESSENTIAL HYPERTENSION: ICD-10-CM

## 2025-05-05 RX ORDER — LOSARTAN POTASSIUM 50 MG/1
50 TABLET ORAL DAILY
Qty: 90 TABLET | Refills: 1 | Status: SHIPPED | OUTPATIENT
Start: 2025-05-05

## 2025-05-05 RX ORDER — GLIPIZIDE 5 MG/1
5 TABLET, FILM COATED, EXTENDED RELEASE ORAL DAILY
Qty: 90 TABLET | Refills: 1 | Status: SHIPPED | OUTPATIENT
Start: 2025-05-05

## 2025-05-13 DIAGNOSIS — E11.51 TYPE 2 DIABETES MELLITUS WITH DIABETIC PERIPHERAL ANGIOPATHY WITHOUT GANGRENE, WITHOUT LONG-TERM CURRENT USE OF INSULIN (HCC): ICD-10-CM

## 2025-05-13 RX ORDER — DULAGLUTIDE 1.5 MG/.5ML
INJECTION, SOLUTION SUBCUTANEOUS
Qty: 8 ML | Refills: 1 | Status: CANCELLED | OUTPATIENT
Start: 2025-05-13

## 2025-05-13 NOTE — TELEPHONE ENCOUNTER
Patient came to the office requesting a script for his Trulicity he has not received last month. He called [facility] and told them the doctor needs to either send electronic or fax the script to them. Patient provided [facility] phone number to send or call      [facility] [phone]  Fax- [phone]  [address]

## 2025-05-14 ENCOUNTER — TELEPHONE (OUTPATIENT)
Age: 85
End: 2025-05-14

## 2025-05-14 DIAGNOSIS — E11.51 TYPE 2 DIABETES MELLITUS WITH DIABETIC PERIPHERAL ANGIOPATHY WITHOUT GANGRENE, WITHOUT LONG-TERM CURRENT USE OF INSULIN (HCC): ICD-10-CM

## 2025-05-14 NOTE — TELEPHONE ENCOUNTER
HR=60 bpm, QUEL=726/54 mmhg, CdC1=993.0 %, Resp=20 B/min, EtCO2=35 mmHg, Apnea=1 Seconds Please re send Trulicity injection to the pharmacy.

## 2025-05-14 NOTE — TELEPHONE ENCOUNTER
Received call from [place] informing provider that patient was approved for Trulicity through [employer] program through 12/31/2025. Original order written 2/26/25 but missing quantity and # refills. Please send new order to [facility] (added to profile) and follow up with patient of approvial and new order being sent.

## 2025-05-15 RX ORDER — DULAGLUTIDE 1.5 MG/.5ML
INJECTION, SOLUTION SUBCUTANEOUS
Qty: 8 ML | Refills: 0 | Status: SHIPPED | OUTPATIENT
Start: 2025-05-15

## 2025-05-19 ENCOUNTER — TELEPHONE (OUTPATIENT)
Age: 85
End: 2025-05-19

## 2025-05-19 DIAGNOSIS — E78.2 MIXED HYPERLIPIDEMIA: ICD-10-CM

## 2025-05-19 RX ORDER — ROSUVASTATIN CALCIUM 10 MG/1
10 TABLET, COATED ORAL
Qty: 30 TABLET | Refills: 0 | Status: SHIPPED | OUTPATIENT
Start: 2025-05-19

## 2025-05-19 NOTE — TELEPHONE ENCOUNTER
Spoke with [facility] - spoke with Jil and she stated the medication is out to ship tomorrow. She provided me the tracking number #[id number] .

## 2025-05-19 NOTE — TELEPHONE ENCOUNTER
Patient called in with problem for medication refill and there was so much static in the line, RN will call back for better connection.    Patient is following up on order for Trulicity that comes from [facility] and he was waiting for call from office to update that order had been sent in. RN confirmed order for Trulicity was sent in to [facility] pharmacy on 5/15. Patient is relieved. RN did adivise if he did not receive medication in 2-3 days to follow up with [facility] for status of order.

## 2025-05-20 DIAGNOSIS — Z12.5 SCREENING FOR PROSTATE CANCER: Primary | ICD-10-CM

## 2025-05-20 DIAGNOSIS — E34.9 TESTOSTERONE DEFICIENCY: ICD-10-CM

## 2025-05-25 DIAGNOSIS — E11.51 TYPE 2 DIABETES MELLITUS WITH DIABETIC PERIPHERAL ANGIOPATHY WITHOUT GANGRENE, WITHOUT LONG-TERM CURRENT USE OF INSULIN (HCC): ICD-10-CM

## 2025-05-25 DIAGNOSIS — M54.50 CHRONIC BILATERAL LOW BACK PAIN WITHOUT SCIATICA: ICD-10-CM

## 2025-05-25 DIAGNOSIS — G89.29 CHRONIC BILATERAL LOW BACK PAIN WITHOUT SCIATICA: ICD-10-CM

## 2025-05-25 DIAGNOSIS — I10 HTN (HYPERTENSION), BENIGN: ICD-10-CM

## 2025-05-26 RX ORDER — AMLODIPINE BESYLATE 5 MG/1
5 TABLET ORAL 2 TIMES DAILY
Qty: 60 TABLET | Refills: 5 | Status: SHIPPED | OUTPATIENT
Start: 2025-05-26

## 2025-05-26 RX ORDER — MELOXICAM 7.5 MG/1
7.5 TABLET ORAL DAILY
Qty: 15 TABLET | Refills: 1 | Status: SHIPPED | OUTPATIENT
Start: 2025-05-26

## 2025-06-06 ENCOUNTER — TELEPHONE (OUTPATIENT)
Age: 85
End: 2025-06-06

## 2025-06-06 NOTE — TELEPHONE ENCOUNTER
I followed up with patient from ED. He stated he is doing better no concerned. He mentioned he has an apt tomorrow with urologist doctor and have his surgery moved soon than wait for two months. Patient was having difficulties urinating. Patient is keeping his apt with  6/12.

## 2025-06-10 ENCOUNTER — RA CDI HCC (OUTPATIENT)
Dept: OTHER | Facility: HOSPITAL | Age: 85
End: 2025-06-10

## 2025-06-10 NOTE — PROGRESS NOTES
HCC coding opportunities       Chart reviewed, no opportunity found: CHART REVIEWED, NO OPPORTUNITY FOUND        Patients Insurance     Medicare Insurance: Medicare           Please let pt know that labs were ok.

## 2025-06-12 ENCOUNTER — OFFICE VISIT (OUTPATIENT)
Age: 85
End: 2025-06-12
Payer: MEDICARE

## 2025-06-12 ENCOUNTER — TELEPHONE (OUTPATIENT)
Dept: ADMINISTRATIVE | Facility: OTHER | Age: 85
End: 2025-06-12

## 2025-06-12 VITALS
DIASTOLIC BLOOD PRESSURE: 70 MMHG | WEIGHT: 214.6 LBS | BODY MASS INDEX: 31.78 KG/M2 | HEART RATE: 81 BPM | TEMPERATURE: 97.9 F | HEIGHT: 69 IN | OXYGEN SATURATION: 97 % | SYSTOLIC BLOOD PRESSURE: 146 MMHG | RESPIRATION RATE: 18 BRPM

## 2025-06-12 DIAGNOSIS — I77.9 PERIPHERAL ARTERIAL OCCLUSIVE DISEASE (HCC): ICD-10-CM

## 2025-06-12 DIAGNOSIS — I25.10 ATHEROSCLEROSIS OF NATIVE CORONARY ARTERY OF NATIVE HEART WITHOUT ANGINA PECTORIS: ICD-10-CM

## 2025-06-12 DIAGNOSIS — I10 HTN (HYPERTENSION), BENIGN: ICD-10-CM

## 2025-06-12 DIAGNOSIS — E11.51 TYPE 2 DIABETES MELLITUS WITH DIABETIC PERIPHERAL ANGIOPATHY WITHOUT GANGRENE, WITHOUT LONG-TERM CURRENT USE OF INSULIN (HCC): Primary | ICD-10-CM

## 2025-06-12 DIAGNOSIS — E78.2 MIXED HYPERLIPIDEMIA: ICD-10-CM

## 2025-06-12 DIAGNOSIS — Z00.00 MEDICARE ANNUAL WELLNESS VISIT, SUBSEQUENT: ICD-10-CM

## 2025-06-12 PROCEDURE — G0439 PPPS, SUBSEQ VISIT: HCPCS

## 2025-06-12 PROCEDURE — 99214 OFFICE O/P EST MOD 30 MIN: CPT

## 2025-06-12 PROCEDURE — G2211 COMPLEX E/M VISIT ADD ON: HCPCS

## 2025-06-12 RX ORDER — OXYBUTYNIN CHLORIDE 5 MG/1
5 TABLET, EXTENDED RELEASE ORAL DAILY
COMMUNITY
Start: 2025-06-09 | End: 2026-06-09

## 2025-06-12 NOTE — PATIENT INSTRUCTIONS
Medicare Preventive Visit Patient Instructions  Thank you for completing your Welcome to Medicare Visit or Medicare Annual Wellness Visit today. Your next wellness visit will be due in one year (6/13/2026).  The screening/preventive services that you may require over the next 5-10 years are detailed below. Some tests may not apply to you based off risk factors and/or age. Screening tests ordered at today's visit but not completed yet may show as past due. Also, please note that scanned in results may not display below.  Preventive Screenings:  Service Recommendations Previous Testing/Comments   Colorectal Cancer Screening  Colonoscopy    Fecal Occult Blood Test (FOBT)/Fecal Immunochemical Test (FIT)  Fecal DNA/Cologuard Test  Flexible Sigmoidoscopy Age: 45-75 years old   Colonoscopy: every 10 years (May be performed more frequently if at higher risk)  OR  FOBT/FIT: every 1 year  OR  Cologuard: every 3 years  OR  Sigmoidoscopy: every 5 years  Screening may be recommended earlier than age 45 if at higher risk for colorectal cancer. Also, an individualized decision between you and your healthcare provider will decide whether screening between the ages of 76-85 would be appropriate. Colonoscopy: 01/18/2022  FOBT/FIT: Not on file  Cologuard: Not on file  Sigmoidoscopy: Not on file    Screening Current     Prostate Cancer Screening Individualized decision between patient and health care provider in men between ages of 55-69   Medicare will cover every 12 months beginning on the day after your 50th birthday PSA: No results in last 5 years     Screening Not Indicated     Hepatitis C Screening Once for adults born between 1945 and 1965  More frequently in patients at high risk for Hepatitis C Hep C Antibody: Not on file        Diabetes Screening 1-2 times per year if you're at risk for diabetes or have pre-diabetes Fasting glucose: No results in last 5 years (No results in last 5 years)  A1C: 7.3 % (12/3/2024)  Screening Not  Indicated  History Diabetes   Cholesterol Screening Once every 5 years if you don't have a lipid disorder. May order more often based on risk factors. Lipid panel: 11/04/2022  Screening Not Indicated  History Lipid Disorder      Other Preventive Screenings Covered by Medicare:  Abdominal Aortic Aneurysm (AAA) Screening: covered once if your at risk. You're considered to be at risk if you have a family history of AAA or a male between the age of 65-75 who smoking at least 100 cigarettes in your lifetime.  Lung Cancer Screening: covers low dose CT scan once per year if you meet all of the following conditions: (1) Age 55-77; (2) No signs or symptoms of lung cancer; (3) Current smoker or have quit smoking within the last 15 years; (4) You have a tobacco smoking history of at least 20 pack years (packs per day x number of years you smoked); (5) You get a written order from a healthcare provider.  Glaucoma Screening: covered annually if you're considered high risk: (1) You have diabetes OR (2) Family history of glaucoma OR (3)  aged 50 and older OR (4)  American aged 65 and older  Osteoporosis Screening: covered every 2 years if you meet one of the following conditions: (1) Have a vertebral abnormality; (2) On glucocorticoid therapy for more than 3 months; (3) Have primary hyperparathyroidism; (4) On osteoporosis medications and need to assess response to drug therapy.  HIV Screening: covered annually if you're between the age of 15-65. Also covered annually if you are younger than 15 and older than 65 with risk factors for HIV infection. For pregnant patients, it is covered up to 3 times per pregnancy.    Immunizations:  Immunization Recommendations   Influenza Vaccine Annual influenza vaccination during flu season is recommended for all persons aged >= 6 months who do not have contraindications   Pneumococcal Vaccine   * Pneumococcal conjugate vaccine = PCV13 (Prevnar 13), PCV15 (Vaxneuvance),  PCV20 (Prevnar 20)  * Pneumococcal polysaccharide vaccine = PPSV23 (Pneumovax) Adults 19-63 yo with certain risk factors or if 65+ yo  If never received any pneumonia vaccine: recommend Prevnar 20 (PCV20)  Give PCV20 if previously received 1 dose of PCV13 or PPSV23   Hepatitis B Vaccine 3 dose series if at intermediate or high risk (ex: diabetes, end stage renal disease, liver disease)   Respiratory syncytial virus (RSV) Vaccine - COVERED BY MEDICARE PART D  * RSVPreF3 (Arexvy) CDC recommends that adults 60 years of age and older may receive a single dose of RSV vaccine using shared clinical decision-making (SCDM)   Tetanus (Td) Vaccine - COST NOT COVERED BY MEDICARE PART B Following completion of primary series, a booster dose should be given every 10 years to maintain immunity against tetanus. Td may also be given as tetanus wound prophylaxis.   Tdap Vaccine - COST NOT COVERED BY MEDICARE PART B Recommended at least once for all adults. For pregnant patients, recommended with each pregnancy.   Shingles Vaccine (Shingrix) - COST NOT COVERED BY MEDICARE PART B  2 shot series recommended in those 19 years and older who have or will have weakened immune systems or those 50 years and older     Health Maintenance Due:  There are no preventive care reminders to display for this patient.  Immunizations Due:      Topic Date Due   • Pneumococcal Vaccine: 50+ Years (1 of 2 - PCV) Never done   • Hepatitis A Vaccine (1 of 2 - Risk 2-dose series) Never done   • Hepatitis B Vaccine (1 of 3 - Risk 3-dose series) Never done   • Influenza Vaccine (Season Ended) 09/01/2025     Advance Directives   What are advance directives?  Advance directives are legal documents that state your wishes and plans for medical care. These plans are made ahead of time in case you lose your ability to make decisions for yourself. Advance directives can apply to any medical decision, such as the treatments you want, and if you want to donate organs.    What are the types of advance directives?  There are many types of advance directives, and each state has rules about how to use them. You may choose a combination of any of the following:  Living will:  This is a written record of the treatment you want. You can also choose which treatments you do not want, which to limit, and which to stop at a certain time. This includes surgery, medicine, IV fluid, and tube feedings.   Durable power of  for healthcare (DPAHC):  This is a written record that states who you want to make healthcare choices for you when you are unable to make them for yourself. This person, called a proxy, is usually a family member or a friend. You may choose more than 1 proxy.  Do not resuscitate (DNR) order:  A DNR order is used in case your heart stops beating or you stop breathing. It is a request not to have certain forms of treatment, such as CPR. A DNR order may be included in other types of advance directives.  Medical directive:  This covers the care that you want if you are in a coma, near death, or unable to make decisions for yourself. You can list the treatments you want for each condition. Treatment may include pain medicine, surgery, blood transfusions, dialysis, IV or tube feedings, and a ventilator (breathing machine).  Values history:  This document has questions about your views, beliefs, and how you feel and think about life. This information can help others choose the care that you would choose.  Why are advance directives important?  An advance directive helps you control your care. Although spoken wishes may be used, it is better to have your wishes written down. Spoken wishes can be misunderstood, or not followed. Treatments may be given even if you do not want them. An advance directive may make it easier for your family to make difficult choices about your care.   Weight Management   Why it is important to manage your weight:  Being overweight increases your risk  of health conditions such as heart disease, high blood pressure, type 2 diabetes, and certain types of cancer. It can also increase your risk for osteoarthritis, sleep apnea, and other respiratory problems. Aim for a slow, steady weight loss. Even a small amount of weight loss can lower your risk of health problems.  How to lose weight safely:  A safe and healthy way to lose weight is to eat fewer calories and get regular exercise. You can lose up about 1 pound a week by decreasing the number of calories you eat by 500 calories each day.   Healthy meal plan for weight management:  A healthy meal plan includes a variety of foods, contains fewer calories, and helps you stay healthy. A healthy meal plan includes the following:  Eat whole-grain foods more often.  A healthy meal plan should contain fiber. Fiber is the part of grains, fruits, and vegetables that is not broken down by your body. Whole-grain foods are healthy and provide extra fiber in your diet. Some examples of whole-grain foods are whole-wheat breads and pastas, oatmeal, brown rice, and bulgur.  Eat a variety of vegetables every day.  Include dark, leafy greens such as spinach, kale, susanne greens, and mustard greens. Eat yellow and orange vegetables such as carrots, sweet potatoes, and winter squash.   Eat a variety of fruits every day.  Choose fresh or canned fruit (canned in its own juice or light syrup) instead of juice. Fruit juice has very little or no fiber.  Eat low-fat dairy foods.  Drink fat-free (skim) milk or 1% milk. Eat fat-free yogurt and low-fat cottage cheese. Try low-fat cheeses such as mozzarella and other reduced-fat cheeses.  Choose meat and other protein foods that are low in fat.  Choose beans or other legumes such as split peas or lentils. Choose fish, skinless poultry (chicken or turkey), or lean cuts of red meat (beef or pork). Before you cook meat or poultry, cut off any visible fat.   Use less fat and oil.  Try baking foods  instead of frying them. Add less fat, such as margarine, sour cream, regular salad dressing and mayonnaise to foods. Eat fewer high-fat foods. Some examples of high-fat foods include french fries, doughnuts, ice cream, and cakes.  Eat fewer sweets.  Limit foods and drinks that are high in sugar. This includes candy, cookies, regular soda, and sweetened drinks.  Exercise:  Exercise at least 30 minutes per day on most days of the week. Some examples of exercise include walking, biking, dancing, and swimming. You can also fit in more physical activity by taking the stairs instead of the elevator or parking farther away from stores. Ask your healthcare provider about the best exercise plan for you.    © Copyright [employer] 2018 Information is for End User's use only and may not be sold, redistributed or otherwise used for commercial purposes. All illustrations and images included in CareNotes® are the copyrighted property of [employer].D.A[employer]M., Inc[employer] or [employer]

## 2025-06-12 NOTE — ASSESSMENT & PLAN NOTE
Lab Results   Component Value Date    HGBA1C 7.3 (H) 12/03/2024   Under control.    Continue current medication.    We will re-evaluate at next office visit.      Orders:  •  Albumin / creatinine urine ratio; Future  •  Basic metabolic panel; Future  •  Hemoglobin A1C; Future

## 2025-06-12 NOTE — PROGRESS NOTES
Diabetic Foot Exam    Patient's shoes and socks removed.    Right Foot/Ankle   Right Foot Inspection  Skin Exam: skin normal and skin intact. No dry skin, no warmth, no callus, no erythema, no maceration, no abnormal color, no pre-ulcer, no ulcer and no callus.     Toe Exam: ROM and strength within normal limits. No swelling, no tenderness, erythema and  no right toe deformity    Sensory   Vibration: intact  Proprioception: intact  Monofilament testing: intact    Vascular  Capillary refills: < 3 seconds  The right DP pulse is 1+. The right PT pulse is 1+.     Left Foot/Ankle  Left Foot Inspection  Skin Exam: skin normal and skin intact. No dry skin, no warmth, no erythema, no maceration, normal color, no pre-ulcer, no ulcer and no callus.     Toe Exam: ROM and strength within normal limits.     Sensory   Vibration: intact  Proprioception: intact  Monofilament testing: intact    Vascular  Capillary refills: < 3 seconds  The left DP pulse is 1+. The left PT pulse is 1+.     Assign Risk Category  No deformity present  No loss of protective sensation  No weak pulses  Risk: 0  Name: Refugio Gonzalez      : 1940      MRN: [id number]  Encounter Provider: Loco Mejia MD  Encounter Date: 2025   Encounter department: [facility]  :  Assessment & Plan  Type 2 diabetes mellitus with diabetic peripheral angiopathy without gangrene, without long-term current use of insulin (Formerly [facility])    Lab Results   Component Value Date    HGBA1C 7.3 (H) 2024   Under control.    Continue current medication.    We will re-evaluate at next office visit.      Orders:  •  Albumin / creatinine urine ratio; Future  •  Basic metabolic panel; Future  •  Hemoglobin A1C; Future    HTN (hypertension), benign  Under control.    Continue amlodipine, losartan and metoprolol  We will re-evaluate at next office visit.           Mixed hyperlipidemia  Under control.    Continue rosuvastatin  We will re-evaluate at next  office visit.           Peripheral arterial occlusive disease (HCC)  Under control.    Continue current medication.    Patient has been followed by cardiologist  We will re-evaluate at next office visit.           Atherosclerosis of native coronary artery of native heart without angina pectoris  Under control.    Continue current medication.    Patient has been followed by cardiologist  We will re-evaluate at next office visit.           Medicare annual wellness visit, subsequent            Preventive health issues were discussed with patient, and age appropriate screening tests were ordered as noted in patient's After Visit Summary. Personalized health advice and appropriate referrals for health education or preventive services given if needed, as noted in patient's After Visit Summary.    History of Present Illness     Patient here for review of chronic medical problems and  the labs and imaging if it is applicable.  Currently has no specific complaints other than mentioned in the review of systems  Denies chest pain, SOB, cough, abdominal pain, nausea, vomiting, fever, chills, lightheadedness, dizziness,headache, tingling or numbness.No bowel or bladder problem.         Patient Care Team:  Loco Mejia MD as PCP - General (Internal Medicine)    Review of Systems   Constitutional:  Negative for chills, fatigue and fever.   HENT:  Negative for congestion, ear pain, rhinorrhea, sneezing and sore throat.    Eyes:  Negative for redness, itching and visual disturbance.   Respiratory:  Negative for cough, chest tightness and shortness of breath.    Cardiovascular:  Negative for chest pain, palpitations and leg swelling.   Gastrointestinal:  Negative for abdominal pain, blood in stool, diarrhea, nausea and vomiting.   Endocrine: Negative for cold intolerance and heat intolerance.   Genitourinary:  Negative for dysuria, frequency and urgency.   Musculoskeletal:  Positive for back pain and myalgias (Off-and-on both  legs). Negative for arthralgias.   Skin:  Negative for color change and rash.   Neurological:  Negative for dizziness, weakness, light-headedness, numbness and headaches.   Hematological:  Does not bruise/bleed easily.   Psychiatric/Behavioral:  Negative for agitation, behavioral problems and confusion.      Medical History Reviewed by provider this encounter:  Tobacco  Allergies  Meds  Problems  Med Hx  Surg Hx  Fam Hx       Annual Wellness Visit Questionnaire       Health Risk Assessment:   Patient rates overall health as good. Patient feels that their physical health rating is same. Patient is satisfied with their life. Eyesight was rated as same. Hearing was rated as slightly worse. Patient feels that their emotional and mental health rating is same. Patients states they are sometimes angry. Patient states they are sometimes unusually tired/fatigued. Pain experienced in the last 7 days has been a lot. Patient's pain rating has been 5/10. Patient states that he has experienced no weight loss or gain in last 6 months.     Depression Screening:   PHQ-2 Score: 0      Fall Risk Screening:   In the past year, patient has experienced: no history of falling in past year      Home Safety:  Patient does not have trouble with stairs inside or outside of their home. Patient has working smoke alarms and has no working carbon monoxide detector. Home safety hazards include: none.     Nutrition:   Current diet is Diabetic, Regular and Low Cholesterol.     Medications:   Patient is currently taking over-the-counter supplements. OTC medications include: see medication list. Patient is able to manage medications.     Activities of Daily Living (ADLs)/Instrumental Activities of Daily Living (IADLs):   Walk and transfer into and out of bed and chair?: Yes  Dress and groom yourself?: Yes    Bathe or shower yourself?: Yes    Feed yourself? Yes  Do your laundry/housekeeping?: Yes  Manage your money, pay your bills and track your  expenses?: Yes  Make your own meals?: Yes    Do your own shopping?: Yes    Previous Hospitalizations:   Any hospitalizations or ED visits within the last 12 months?: Yes    How many hospitalizations have you had in the last year?: 1-2    Advance Care Planning:   Living will: No    Durable POA for healthcare: No    Advanced directive: No      Cognitive Screening:   Provider or family/friend/caregiver concerned regarding cognition?: No    Preventive Screenings      Cardiovascular Screening:    General: Screening Not Indicated and History Lipid Disorder      Diabetes Screening:     General: Screening Not Indicated and History Diabetes      Colorectal Cancer Screening:     General: Screening Current      Prostate Cancer Screening:    General: Screening Not Indicated      Abdominal Aortic Aneurysm (AAA) Screening:    Risk factors include: tobacco use        Lung Cancer Screening:     General: Screening Not Indicated    Immunizations:  - Immunizations due: Prevnar 20, Zoster (Shingrix), Hepatitis A and Hepatitis B    Screening, Brief Intervention, and Referral to Treatment (SBIRT)     Screening  Typical number of drinks in a day: 0  Typical number of drinks in a week: 0  Interpretation: Low risk drinking behavior.    Single Item Drug Screening:  How often have you used an illegal drug (including marijuana) or a prescription medication for non-medical reasons in the past year? never    Single Item Drug Screen Score: 0  Interpretation: Negative screen for possible drug use disorder    Social Drivers of Health     Financial Resource Strain: Low Risk  (4/1/2024)    Received from [facility]    Overall Financial Resource Strain (CARDIA)    • Difficulty of Paying Living Expenses: Not very hard   Food Insecurity: No Food Insecurity (6/12/2025)    Nursing - Inadequate Food Risk Classification    • Worried About Running Out of Food in the Last Year: Never true    • Ran Out of Food in the Last Year: Never true  "  Transportation Needs: No Transportation Needs (6/12/2025)    PRAPARE - Transportation    • Lack of Transportation (Medical): No    • Lack of Transportation (Non-Medical): No   Housing Stability: Low Risk  (6/12/2025)    Housing Stability Vital Sign    • Unable to Pay for Housing in the Last Year: No    • Number of Times Moved in the Last Year: 0    • Homeless in the Last Year: No   Utilities: Not At Risk (6/12/2025)    [facility] Utilities    • Threatened with loss of utilities: No     No results found.    Objective   /70 (BP Location: Right arm, Patient Position: Sitting, Cuff Size: Standard)   Pulse 81   Temp 97.9 °F (36.6 °C) (Temporal)   Resp 18   Ht 5' 9\" (1.753 m)   Wt 97.3 kg (214 lb 9.6 oz)   SpO2 97%   BMI 31.69 kg/m²     Physical Exam  Vitals and nursing note reviewed.   Constitutional:       General: He is not in acute distress.     Appearance: Normal appearance. He is well-developed and normal weight. He is not ill-appearing, toxic-appearing or diaphoretic.   HENT:      Head: Normocephalic and atraumatic.      Right Ear: External ear normal.      Left Ear: External ear normal.      Nose: Nose normal. No congestion or rhinorrhea.      Mouth/Throat:      Mouth: Mucous membranes are moist.      Pharynx: Oropharynx is clear.     Eyes:      General: No scleral icterus.        Right eye: No discharge.         Left eye: No discharge.      Extraocular Movements: Extraocular movements intact.      Conjunctiva/sclera: Conjunctivae normal.      Pupils: Pupils are equal, round, and reactive to light.       Cardiovascular:      Rate and Rhythm: Normal rate and regular rhythm.      Pulses: no weak pulses.           Dorsalis pedis pulses are 1+ on the right side and 1+ on the left side.        Posterior tibial pulses are 1+ on the right side and 1+ on the left side.      Heart sounds: Normal heart sounds. No murmur heard.     No gallop.   Pulmonary:      Effort: Pulmonary effort is normal. No respiratory " distress.      Breath sounds: Normal breath sounds. No wheezing or rales.   Abdominal:      General: Abdomen is flat. Bowel sounds are normal. There is no distension.      Palpations: Abdomen is soft.      Tenderness: There is no abdominal tenderness. There is no right CVA tenderness, left CVA tenderness or guarding.     Musculoskeletal:         General: No swelling or tenderness. Normal range of motion.      Cervical back: Normal range of motion and neck supple. No rigidity.      Right lower leg: No edema.      Left lower leg: No edema.   Feet:      Right foot:      Skin integrity: No ulcer, skin breakdown, erythema, warmth, callus or dry skin.      Left foot:      Skin integrity: No ulcer, skin breakdown, erythema, warmth, callus or dry skin.   Lymphadenopathy:      Cervical: No cervical adenopathy.     Skin:     General: Skin is warm and dry.      Capillary Refill: Capillary refill takes 2 to 3 seconds.      Coloration: Skin is not jaundiced.     Neurological:      General: No focal deficit present.      Mental Status: He is alert and oriented to person, place, and time. Mental status is at baseline.      Sensory: No sensory deficit.      Motor: No weakness.     Psychiatric:         Mood and Affect: Mood normal.         Behavior: Behavior normal.

## 2025-06-12 NOTE — TELEPHONE ENCOUNTER
Upon review of the In Basket request we were able to locate, review, and update the patient chart as requested for Hemoglobin A1c.    Any additional questions or concerns should be emailed to the Practice Liaisons via the appropriate education email address, please do not reply via In Basket.    Thank you  Celina Almaraz MA   [facility]

## 2025-06-12 NOTE — TELEPHONE ENCOUNTER
----- Message from Jessie LAUGHLIN sent at 6/12/2025  9:57 AM EDT -----  Regarding: care gap request  06/12/25 9:57 Radha, our patient attached above has had Hemoglobin A1c completed/performed. Please assist in updating the patient chart by pulling the document from the Media Tab. The date of service is 03/07/2025. Thank you,STEFANY Marroquin RD PRIMARY CARE

## 2025-06-15 DIAGNOSIS — E34.9 TESTOSTERONE DEFICIENCY: ICD-10-CM

## 2025-06-16 RX ORDER — TESTOSTERONE CYPIONATE 200 MG/ML
INJECTION, SOLUTION INTRAMUSCULAR
Qty: 3 ML | Refills: 0 | Status: SHIPPED | OUTPATIENT
Start: 2025-06-16

## 2025-06-17 DIAGNOSIS — E78.2 MIXED HYPERLIPIDEMIA: ICD-10-CM

## 2025-06-17 RX ORDER — ROSUVASTATIN CALCIUM 10 MG/1
10 TABLET, COATED ORAL
Qty: 30 TABLET | Refills: 0 | Status: SHIPPED | OUTPATIENT
Start: 2025-06-17

## 2025-06-22 DIAGNOSIS — M54.50 CHRONIC BILATERAL LOW BACK PAIN WITHOUT SCIATICA: ICD-10-CM

## 2025-06-22 DIAGNOSIS — G89.29 CHRONIC BILATERAL LOW BACK PAIN WITHOUT SCIATICA: ICD-10-CM

## 2025-06-22 RX ORDER — MELOXICAM 7.5 MG/1
7.5 TABLET ORAL DAILY
Qty: 15 TABLET | Refills: 1 | Status: SHIPPED | OUTPATIENT
Start: 2025-06-22

## 2025-06-29 DIAGNOSIS — R60.0 EDEMA OF BOTH LOWER LEGS: ICD-10-CM

## 2025-07-01 RX ORDER — FUROSEMIDE 20 MG/1
20 TABLET ORAL DAILY
Qty: 90 TABLET | Refills: 1 | Status: SHIPPED | OUTPATIENT
Start: 2025-07-01

## 2025-07-14 DIAGNOSIS — E34.9 TESTOSTERONE DEFICIENCY: ICD-10-CM

## 2025-07-15 RX ORDER — TESTOSTERONE CYPIONATE 200 MG/ML
INJECTION, SOLUTION INTRAMUSCULAR
Qty: 3 ML | Refills: 0 | Status: SHIPPED | OUTPATIENT
Start: 2025-07-15

## 2025-07-18 DIAGNOSIS — E11.51 TYPE 2 DIABETES MELLITUS WITH DIABETIC PERIPHERAL ANGIOPATHY WITHOUT GANGRENE, WITHOUT LONG-TERM CURRENT USE OF INSULIN (HCC): ICD-10-CM

## 2025-07-20 RX ORDER — DAPAGLIFLOZIN 10 MG/1
10 TABLET, FILM COATED ORAL DAILY
Qty: 90 TABLET | Refills: 1 | Status: SHIPPED | OUTPATIENT
Start: 2025-07-20

## 2025-07-21 DIAGNOSIS — E78.2 MIXED HYPERLIPIDEMIA: ICD-10-CM

## 2025-07-22 RX ORDER — ROSUVASTATIN CALCIUM 10 MG/1
10 TABLET, COATED ORAL
Qty: 30 TABLET | Refills: 0 | Status: SHIPPED | OUTPATIENT
Start: 2025-07-22

## 2025-07-28 DIAGNOSIS — M54.50 CHRONIC BILATERAL LOW BACK PAIN WITHOUT SCIATICA: ICD-10-CM

## 2025-07-28 DIAGNOSIS — G89.29 CHRONIC BILATERAL LOW BACK PAIN WITHOUT SCIATICA: ICD-10-CM

## 2025-07-29 RX ORDER — MELOXICAM 7.5 MG/1
7.5 TABLET ORAL DAILY
Qty: 15 TABLET | Refills: 1 | Status: SHIPPED | OUTPATIENT
Start: 2025-07-29

## (undated) DEVICE — GLOVE SRG BIOGEL 7.5

## (undated) DEVICE — Device

## (undated) DEVICE — POUCH UR CATCHER STERILE

## (undated) DEVICE — CYSTO TUBING TUR Y IRRIGATION

## (undated) DEVICE — RADIOLOGY STERILE LABELS: Brand: CENTURION

## (undated) DEVICE — GUIDEWIRE STRGHT TIP 0.038 IN SOLO PLUS

## (undated) DEVICE — CYSTOSCOPY PACK: Brand: CONVERTORS

## (undated) DEVICE — NGAGE, NITINOL STONE EXTRACTOR: Brand: NGAGE

## (undated) DEVICE — LUBRICANT SURGILUBE TUBE 4 OZ  FLIP TOP

## (undated) DEVICE — SHEATH URETHERAL ACCESS FLEXOR 12FR 35CM

## (undated) DEVICE — SPECIMEN CONTAINER STERILE PEEL PACK

## (undated) DEVICE — SEAL ADJUST BIOPSY PORT Y ADAPTOR

## (undated) DEVICE — URETERAL CATH 5 FR

## (undated) DEVICE — SNAP KOVER: Brand: UNBRANDED

## (undated) DEVICE — FABRIC REINFORCED, SURGICAL GOWN, XL: Brand: CONVERTORS

## (undated) DEVICE — LASER HOLMIUM FIBER 365 MIC